# Patient Record
Sex: MALE | Race: WHITE | NOT HISPANIC OR LATINO | Employment: OTHER | URBAN - METROPOLITAN AREA
[De-identification: names, ages, dates, MRNs, and addresses within clinical notes are randomized per-mention and may not be internally consistent; named-entity substitution may affect disease eponyms.]

---

## 2018-07-10 ENCOUNTER — TRANSCRIBE ORDERS (OUTPATIENT)
Dept: ADMINISTRATIVE | Facility: HOSPITAL | Age: 83
End: 2018-07-10

## 2018-07-10 ENCOUNTER — HOSPITAL ENCOUNTER (OUTPATIENT)
Dept: RADIOLOGY | Facility: HOSPITAL | Age: 83
Discharge: HOME | End: 2018-07-10
Payer: COMMERCIAL

## 2018-07-10 DIAGNOSIS — M12.9 ACUTE ARTHROPATHY: Primary | ICD-10-CM

## 2018-07-10 DIAGNOSIS — M12.9 ACUTE ARTHROPATHY: ICD-10-CM

## 2018-07-10 PROCEDURE — 73630 X-RAY EXAM OF FOOT: CPT

## 2019-08-09 ENCOUNTER — HOSPITAL ENCOUNTER (OUTPATIENT)
Dept: RADIOLOGY | Facility: HOSPITAL | Age: 84
Discharge: HOME | End: 2019-08-09
Payer: COMMERCIAL

## 2019-08-09 ENCOUNTER — TRANSCRIBE ORDERS (OUTPATIENT)
Dept: ADMINISTRATIVE | Facility: HOSPITAL | Age: 84
End: 2019-08-09

## 2019-08-09 DIAGNOSIS — I73.9 PERIPHERAL VASCULAR DISEASE, UNSPECIFIED (HCC): Primary | ICD-10-CM

## 2019-08-09 DIAGNOSIS — M12.9 ACUTE ARTHROPATHY: ICD-10-CM

## 2019-08-09 DIAGNOSIS — G62.9 PERIPHERAL NERVE DISORDER: Primary | ICD-10-CM

## 2019-08-09 DIAGNOSIS — M12.9 ACUTE ARTHROPATHY: Primary | ICD-10-CM

## 2019-08-09 PROCEDURE — 72110 X-RAY EXAM L-2 SPINE 4/>VWS: CPT

## 2019-08-13 ENCOUNTER — HOSPITAL ENCOUNTER (OUTPATIENT)
Dept: NON INVASIVE DIAGNOSTICS | Facility: HOSPITAL | Age: 84
Discharge: HOME | End: 2019-08-13
Payer: COMMERCIAL

## 2019-08-13 DIAGNOSIS — I73.9 PERIPHERAL VASCULAR DISEASE, UNSPECIFIED (HCC): ICD-10-CM

## 2019-08-13 PROCEDURE — 93925 LOWER EXTREMITY STUDY: CPT

## 2019-08-13 PROCEDURE — 93925 LOWER EXTREMITY STUDY: CPT | Performed by: SURGERY

## 2019-08-13 PROCEDURE — 93922 UPR/L XTREMITY ART 2 LEVELS: CPT | Performed by: SURGERY

## 2019-08-13 PROCEDURE — 93923 UPR/LXTR ART STDY 3+ LVLS: CPT

## 2019-08-16 ENCOUNTER — HOSPITAL ENCOUNTER (OUTPATIENT)
Dept: NEUROLOGY | Facility: CLINIC | Age: 84
Discharge: HOME | End: 2019-08-16
Payer: COMMERCIAL

## 2019-08-16 DIAGNOSIS — G62.9 PERIPHERAL NERVE DISORDER: ICD-10-CM

## 2019-08-16 PROCEDURE — 95886 MUSC TEST DONE W/N TEST COMP: CPT | Performed by: PHYSICAL MEDICINE & REHABILITATION

## 2019-08-16 PROCEDURE — 95912 NRV CNDJ TEST 11-12 STUDIES: CPT | Performed by: PHYSICAL MEDICINE & REHABILITATION

## 2022-10-18 ENCOUNTER — HOSPITAL ENCOUNTER (INPATIENT)
Facility: HOSPITAL | Age: 87
LOS: 3 days | Discharge: NURSING, REHAB OR LONG-TERM CARE | End: 2022-10-21
Attending: UROLOGY | Admitting: INTERNAL MEDICINE
Payer: COMMERCIAL

## 2022-10-18 ENCOUNTER — ANESTHESIA (INPATIENT)
Dept: PERIOP | Facility: HOSPITAL | Age: 87
End: 2022-10-18
Payer: COMMERCIAL

## 2022-10-18 ENCOUNTER — ANESTHESIA EVENT (INPATIENT)
Dept: PERIOP | Facility: HOSPITAL | Age: 87
End: 2022-10-18
Payer: COMMERCIAL

## 2022-10-18 ENCOUNTER — TELEPHONE (OUTPATIENT)
Dept: UROLOGY | Facility: CLINIC | Age: 87
End: 2022-10-18

## 2022-10-18 ENCOUNTER — HOSPITAL ENCOUNTER (EMERGENCY)
Facility: HOSPITAL | Age: 87
End: 2022-10-18
Attending: EMERGENCY MEDICINE
Payer: COMMERCIAL

## 2022-10-18 ENCOUNTER — APPOINTMENT (EMERGENCY)
Dept: CT IMAGING | Facility: HOSPITAL | Age: 87
End: 2022-10-18
Payer: COMMERCIAL

## 2022-10-18 ENCOUNTER — APPOINTMENT (EMERGENCY)
Dept: RADIOLOGY | Facility: HOSPITAL | Age: 87
End: 2022-10-18
Payer: COMMERCIAL

## 2022-10-18 ENCOUNTER — APPOINTMENT (OUTPATIENT)
Dept: RADIOLOGY | Facility: HOSPITAL | Age: 87
End: 2022-10-18
Payer: COMMERCIAL

## 2022-10-18 ENCOUNTER — PREP FOR PROCEDURE (OUTPATIENT)
Dept: UROLOGY | Facility: CLINIC | Age: 87
End: 2022-10-18

## 2022-10-18 VITALS
OXYGEN SATURATION: 94 % | WEIGHT: 165 LBS | TEMPERATURE: 97.8 F | RESPIRATION RATE: 18 BRPM | HEIGHT: 70 IN | HEART RATE: 89 BPM | BODY MASS INDEX: 23.62 KG/M2 | DIASTOLIC BLOOD PRESSURE: 63 MMHG | SYSTOLIC BLOOD PRESSURE: 135 MMHG

## 2022-10-18 DIAGNOSIS — S32.000D COMPRESSION FRACTURE OF LUMBAR VERTEBRA WITH ROUTINE HEALING, UNSPECIFIED LUMBAR VERTEBRAL LEVEL, SUBSEQUENT ENCOUNTER: Primary | ICD-10-CM

## 2022-10-18 DIAGNOSIS — S32.030A CLOSED COMPRESSION FRACTURE OF L3 LUMBAR VERTEBRA, INITIAL ENCOUNTER (HCC): ICD-10-CM

## 2022-10-18 DIAGNOSIS — S32.040A CLOSED COMPRESSION FRACTURE OF L4 LUMBAR VERTEBRA, INITIAL ENCOUNTER (HCC): ICD-10-CM

## 2022-10-18 DIAGNOSIS — N20.1 LEFT URETERAL STONE: Primary | ICD-10-CM

## 2022-10-18 DIAGNOSIS — N20.1 URETERAL STONE: ICD-10-CM

## 2022-10-18 DIAGNOSIS — N20.1 URETEROLITHIASIS: ICD-10-CM

## 2022-10-18 DIAGNOSIS — Z46.6 ENCOUNTER FOR ADJUSTMENT OF URETERAL STENT: ICD-10-CM

## 2022-10-18 DIAGNOSIS — R33.9 URINARY RETENTION: ICD-10-CM

## 2022-10-18 DIAGNOSIS — R29.6 FALLS FREQUENTLY: Primary | ICD-10-CM

## 2022-10-18 DIAGNOSIS — N28.9 ACUTE RENAL INSUFFICIENCY: ICD-10-CM

## 2022-10-18 PROBLEM — N39.0 COMPLICATED UTI (URINARY TRACT INFECTION): Status: ACTIVE | Noted: 2022-10-18

## 2022-10-18 PROBLEM — E78.5 HYPERLIPIDEMIA: Status: ACTIVE | Noted: 2022-10-08

## 2022-10-18 PROBLEM — N17.9 ACUTE KIDNEY INJURY (HCC): Status: ACTIVE | Noted: 2022-10-18

## 2022-10-18 PROBLEM — K21.9 GASTRO-ESOPHAGEAL REFLUX DISEASE WITHOUT ESOPHAGITIS: Status: ACTIVE | Noted: 2022-10-08

## 2022-10-18 PROBLEM — I10 ESSENTIAL (PRIMARY) HYPERTENSION: Status: ACTIVE | Noted: 2022-10-08

## 2022-10-18 PROBLEM — R41.0 DISORIENTATION: Status: ACTIVE | Noted: 2022-10-18

## 2022-10-18 LAB
2HR DELTA HS TROPONIN: 0 NG/L
ABO GROUP BLD: NORMAL
ABO GROUP BLD: NORMAL
ALBUMIN SERPL BCP-MCNC: 2.6 G/DL (ref 3.5–5)
ALP SERPL-CCNC: 84 U/L (ref 46–116)
ALT SERPL W P-5'-P-CCNC: 44 U/L (ref 12–78)
ANION GAP SERPL CALCULATED.3IONS-SCNC: 8 MMOL/L (ref 4–13)
APTT PPP: 41 SECONDS (ref 23–37)
AST SERPL W P-5'-P-CCNC: 44 U/L (ref 5–45)
BACTERIA UR QL AUTO: ABNORMAL /HPF
BASOPHILS # BLD AUTO: 0.04 THOUSANDS/ÂΜL (ref 0–0.1)
BASOPHILS NFR BLD AUTO: 0 % (ref 0–1)
BILIRUB DIRECT SERPL-MCNC: 0.49 MG/DL (ref 0–0.2)
BILIRUB SERPL-MCNC: 1.5 MG/DL (ref 0.2–1)
BILIRUB UR QL STRIP: NEGATIVE
BLD GP AB SCN SERPL QL: NEGATIVE
BUN SERPL-MCNC: 50 MG/DL (ref 5–25)
CALCIUM SERPL-MCNC: 9.5 MG/DL (ref 8.3–10.1)
CARDIAC TROPONIN I PNL SERPL HS: 12 NG/L
CARDIAC TROPONIN I PNL SERPL HS: 12 NG/L
CHLORIDE SERPL-SCNC: 100 MMOL/L (ref 96–108)
CLARITY UR: CLEAR
CO2 SERPL-SCNC: 24 MMOL/L (ref 21–32)
COLOR UR: YELLOW
CREAT SERPL-MCNC: 2.21 MG/DL (ref 0.6–1.3)
EOSINOPHIL # BLD AUTO: 0.02 THOUSAND/ÂΜL (ref 0–0.61)
EOSINOPHIL NFR BLD AUTO: 0 % (ref 0–6)
ERYTHROCYTE [DISTWIDTH] IN BLOOD BY AUTOMATED COUNT: 14.2 % (ref 11.6–15.1)
GFR SERPL CREATININE-BSD FRML MDRD: 25 ML/MIN/1.73SQ M
GLUCOSE SERPL-MCNC: 125 MG/DL (ref 65–140)
GLUCOSE UR STRIP-MCNC: NEGATIVE MG/DL
HCT VFR BLD AUTO: 32.3 % (ref 36.5–49.3)
HGB BLD-MCNC: 10.6 G/DL (ref 12–17)
HGB UR QL STRIP.AUTO: ABNORMAL
IMM GRANULOCYTES # BLD AUTO: 0.08 THOUSAND/UL (ref 0–0.2)
IMM GRANULOCYTES NFR BLD AUTO: 1 % (ref 0–2)
INR PPP: 1.14 (ref 0.84–1.19)
KETONES UR STRIP-MCNC: NEGATIVE MG/DL
LEUKOCYTE ESTERASE UR QL STRIP: ABNORMAL
LYMPHOCYTES # BLD AUTO: 0.93 THOUSANDS/ÂΜL (ref 0.6–4.47)
LYMPHOCYTES NFR BLD AUTO: 9 % (ref 14–44)
MCH RBC QN AUTO: 34.6 PG (ref 26.8–34.3)
MCHC RBC AUTO-ENTMCNC: 32.8 G/DL (ref 31.4–37.4)
MCV RBC AUTO: 106 FL (ref 82–98)
MONOCYTES # BLD AUTO: 0.76 THOUSAND/ÂΜL (ref 0.17–1.22)
MONOCYTES NFR BLD AUTO: 8 % (ref 4–12)
MUCOUS THREADS UR QL AUTO: ABNORMAL
NEUTROPHILS # BLD AUTO: 8.14 THOUSANDS/ÂΜL (ref 1.85–7.62)
NEUTS SEG NFR BLD AUTO: 82 % (ref 43–75)
NITRITE UR QL STRIP: NEGATIVE
NON-SQ EPI CELLS URNS QL MICRO: ABNORMAL /HPF
NRBC BLD AUTO-RTO: 0 /100 WBCS
PH UR STRIP.AUTO: 5.5 [PH]
PLATELET # BLD AUTO: 330 THOUSANDS/UL (ref 149–390)
PMV BLD AUTO: 8.8 FL (ref 8.9–12.7)
POTASSIUM SERPL-SCNC: 4.6 MMOL/L (ref 3.5–5.3)
PROT SERPL-MCNC: 6.5 G/DL (ref 6.4–8.4)
PROT UR STRIP-MCNC: ABNORMAL MG/DL
PROTHROMBIN TIME: 15.4 SECONDS (ref 11.6–14.5)
RBC # BLD AUTO: 3.06 MILLION/UL (ref 3.88–5.62)
RBC #/AREA URNS AUTO: ABNORMAL /HPF
RH BLD: POSITIVE
RH BLD: POSITIVE
SODIUM SERPL-SCNC: 132 MMOL/L (ref 135–147)
SP GR UR STRIP.AUTO: 1.02 (ref 1–1.03)
SPECIMEN EXPIRATION DATE: NORMAL
UROBILINOGEN UR STRIP-ACNC: <2 MG/DL
WBC # BLD AUTO: 9.97 THOUSAND/UL (ref 4.31–10.16)
WBC #/AREA URNS AUTO: ABNORMAL /HPF

## 2022-10-18 PROCEDURE — 99285 EMERGENCY DEPT VISIT HI MDM: CPT | Performed by: EMERGENCY MEDICINE

## 2022-10-18 PROCEDURE — 80076 HEPATIC FUNCTION PANEL: CPT | Performed by: EMERGENCY MEDICINE

## 2022-10-18 PROCEDURE — 87086 URINE CULTURE/COLONY COUNT: CPT | Performed by: EMERGENCY MEDICINE

## 2022-10-18 PROCEDURE — 93005 ELECTROCARDIOGRAM TRACING: CPT

## 2022-10-18 PROCEDURE — 99285 EMERGENCY DEPT VISIT HI MDM: CPT

## 2022-10-18 PROCEDURE — 86901 BLOOD TYPING SEROLOGIC RH(D): CPT | Performed by: EMERGENCY MEDICINE

## 2022-10-18 PROCEDURE — 99221 1ST HOSP IP/OBS SF/LOW 40: CPT | Performed by: UROLOGY

## 2022-10-18 PROCEDURE — 86850 RBC ANTIBODY SCREEN: CPT | Performed by: EMERGENCY MEDICINE

## 2022-10-18 PROCEDURE — 85610 PROTHROMBIN TIME: CPT | Performed by: EMERGENCY MEDICINE

## 2022-10-18 PROCEDURE — 74177 CT ABD & PELVIS W/CONTRAST: CPT

## 2022-10-18 PROCEDURE — 73552 X-RAY EXAM OF FEMUR 2/>: CPT

## 2022-10-18 PROCEDURE — C2617 STENT, NON-COR, TEM W/O DEL: HCPCS | Performed by: UROLOGY

## 2022-10-18 PROCEDURE — 85025 COMPLETE CBC W/AUTO DIFF WBC: CPT | Performed by: EMERGENCY MEDICINE

## 2022-10-18 PROCEDURE — 71260 CT THORAX DX C+: CPT

## 2022-10-18 PROCEDURE — 96361 HYDRATE IV INFUSION ADD-ON: CPT

## 2022-10-18 PROCEDURE — C1769 GUIDE WIRE: HCPCS | Performed by: UROLOGY

## 2022-10-18 PROCEDURE — 96372 THER/PROPH/DIAG INJ SC/IM: CPT

## 2022-10-18 PROCEDURE — 80048 BASIC METABOLIC PNL TOTAL CA: CPT | Performed by: EMERGENCY MEDICINE

## 2022-10-18 PROCEDURE — 70450 CT HEAD/BRAIN W/O DYE: CPT

## 2022-10-18 PROCEDURE — 36415 COLL VENOUS BLD VENIPUNCTURE: CPT | Performed by: EMERGENCY MEDICINE

## 2022-10-18 PROCEDURE — G1004 CDSM NDSC: HCPCS

## 2022-10-18 PROCEDURE — 74420 UROGRAPHY RTRGR +-KUB: CPT

## 2022-10-18 PROCEDURE — 0T778DZ DILATION OF LEFT URETER WITH INTRALUMINAL DEVICE, VIA NATURAL OR ARTIFICIAL OPENING ENDOSCOPIC: ICD-10-PCS | Performed by: UROLOGY

## 2022-10-18 PROCEDURE — 86900 BLOOD TYPING SEROLOGIC ABO: CPT | Performed by: EMERGENCY MEDICINE

## 2022-10-18 PROCEDURE — 81001 URINALYSIS AUTO W/SCOPE: CPT | Performed by: EMERGENCY MEDICINE

## 2022-10-18 PROCEDURE — 99223 1ST HOSP IP/OBS HIGH 75: CPT | Performed by: INTERNAL MEDICINE

## 2022-10-18 PROCEDURE — 96365 THER/PROPH/DIAG IV INF INIT: CPT

## 2022-10-18 PROCEDURE — BT1FYZZ FLUOROSCOPY OF LEFT KIDNEY, URETER AND BLADDER USING OTHER CONTRAST: ICD-10-PCS | Performed by: UROLOGY

## 2022-10-18 PROCEDURE — 85730 THROMBOPLASTIN TIME PARTIAL: CPT | Performed by: EMERGENCY MEDICINE

## 2022-10-18 PROCEDURE — 72170 X-RAY EXAM OF PELVIS: CPT

## 2022-10-18 PROCEDURE — 52332 CYSTOSCOPY AND TREATMENT: CPT | Performed by: UROLOGY

## 2022-10-18 PROCEDURE — 84484 ASSAY OF TROPONIN QUANT: CPT | Performed by: EMERGENCY MEDICINE

## 2022-10-18 DEVICE — STENT URETERAL 6 FR 26CM INLAY OPTIMA: Type: IMPLANTABLE DEVICE | Site: URETER | Status: FUNCTIONAL

## 2022-10-18 RX ORDER — FENTANYL CITRATE/PF 50 MCG/ML
25 SYRINGE (ML) INJECTION
Status: DISCONTINUED | OUTPATIENT
Start: 2022-10-18 | End: 2022-10-18 | Stop reason: HOSPADM

## 2022-10-18 RX ORDER — OXYCODONE HYDROCHLORIDE 5 MG/1
2.5 TABLET ORAL EVERY 4 HOURS PRN
Status: DISCONTINUED | OUTPATIENT
Start: 2022-10-18 | End: 2022-10-19

## 2022-10-18 RX ORDER — ASPIRIN 81 MG/1
81 TABLET ORAL DAILY
Status: DISCONTINUED | OUTPATIENT
Start: 2022-10-19 | End: 2022-10-21 | Stop reason: HOSPADM

## 2022-10-18 RX ORDER — OLANZAPINE 10 MG/1
2.5 INJECTION, POWDER, LYOPHILIZED, FOR SOLUTION INTRAMUSCULAR
Status: DISCONTINUED | OUTPATIENT
Start: 2022-10-18 | End: 2022-10-21 | Stop reason: HOSPADM

## 2022-10-18 RX ORDER — CEFAZOLIN SODIUM 1 G/50ML
1000 SOLUTION INTRAVENOUS ONCE
Status: DISCONTINUED | OUTPATIENT
Start: 2022-10-18 | End: 2022-10-18 | Stop reason: HOSPADM

## 2022-10-18 RX ORDER — PROPOFOL 10 MG/ML
INJECTION, EMULSION INTRAVENOUS AS NEEDED
Status: DISCONTINUED | OUTPATIENT
Start: 2022-10-18 | End: 2022-10-18

## 2022-10-18 RX ORDER — LEVOTHYROXINE SODIUM 0.12 MG/1
125 TABLET ORAL
Status: DISCONTINUED | OUTPATIENT
Start: 2022-10-19 | End: 2022-10-21 | Stop reason: HOSPADM

## 2022-10-18 RX ORDER — SIMVASTATIN 40 MG
1 TABLET ORAL DAILY
COMMUNITY
Start: 2022-10-06

## 2022-10-18 RX ORDER — HALOPERIDOL 5 MG/ML
5 INJECTION INTRAMUSCULAR ONCE
Status: COMPLETED | OUTPATIENT
Start: 2022-10-18 | End: 2022-10-18

## 2022-10-18 RX ORDER — OMEPRAZOLE 20 MG/1
CAPSULE, DELAYED RELEASE ORAL
COMMUNITY
Start: 2022-04-26

## 2022-10-18 RX ORDER — ACETAMINOPHEN 325 MG/1
975 TABLET ORAL ONCE
Status: CANCELLED | OUTPATIENT
Start: 2022-10-18 | End: 2022-10-18

## 2022-10-18 RX ORDER — MAGNESIUM HYDROXIDE 1200 MG/15ML
LIQUID ORAL AS NEEDED
Status: DISCONTINUED | OUTPATIENT
Start: 2022-10-18 | End: 2022-10-18 | Stop reason: HOSPADM

## 2022-10-18 RX ORDER — CEFAZOLIN SODIUM 1 G/3ML
INJECTION, POWDER, FOR SOLUTION INTRAMUSCULAR; INTRAVENOUS AS NEEDED
Status: DISCONTINUED | OUTPATIENT
Start: 2022-10-18 | End: 2022-10-18

## 2022-10-18 RX ORDER — HYDROMORPHONE HCL IN WATER/PF 6 MG/30 ML
0.2 PATIENT CONTROLLED ANALGESIA SYRINGE INTRAVENOUS
Status: DISCONTINUED | OUTPATIENT
Start: 2022-10-18 | End: 2022-10-19

## 2022-10-18 RX ORDER — ASPIRIN 81 MG/1
81 TABLET ORAL
COMMUNITY

## 2022-10-18 RX ORDER — AMOXICILLIN 250 MG
1 CAPSULE ORAL EVERY EVENING
Status: DISCONTINUED | OUTPATIENT
Start: 2022-10-19 | End: 2022-10-21 | Stop reason: HOSPADM

## 2022-10-18 RX ORDER — PRAVASTATIN SODIUM 40 MG
80 TABLET ORAL
Status: DISCONTINUED | OUTPATIENT
Start: 2022-10-19 | End: 2022-10-21 | Stop reason: HOSPADM

## 2022-10-18 RX ORDER — ONDANSETRON 2 MG/ML
4 INJECTION INTRAMUSCULAR; INTRAVENOUS EVERY 6 HOURS PRN
Status: DISCONTINUED | OUTPATIENT
Start: 2022-10-18 | End: 2022-10-21 | Stop reason: HOSPADM

## 2022-10-18 RX ORDER — ONDANSETRON 2 MG/ML
INJECTION INTRAMUSCULAR; INTRAVENOUS AS NEEDED
Status: DISCONTINUED | OUTPATIENT
Start: 2022-10-18 | End: 2022-10-18

## 2022-10-18 RX ORDER — ACETAMINOPHEN 325 MG/1
650 TABLET ORAL EVERY 6 HOURS PRN
Status: DISCONTINUED | OUTPATIENT
Start: 2022-10-18 | End: 2022-10-21 | Stop reason: HOSPADM

## 2022-10-18 RX ORDER — SODIUM CHLORIDE, SODIUM LACTATE, POTASSIUM CHLORIDE, CALCIUM CHLORIDE 600; 310; 30; 20 MG/100ML; MG/100ML; MG/100ML; MG/100ML
INJECTION, SOLUTION INTRAVENOUS CONTINUOUS PRN
Status: DISCONTINUED | OUTPATIENT
Start: 2022-10-18 | End: 2022-10-18

## 2022-10-18 RX ORDER — FENTANYL CITRATE 50 UG/ML
INJECTION, SOLUTION INTRAMUSCULAR; INTRAVENOUS AS NEEDED
Status: DISCONTINUED | OUTPATIENT
Start: 2022-10-18 | End: 2022-10-18

## 2022-10-18 RX ORDER — HEPARIN SODIUM 5000 [USP'U]/ML
5000 INJECTION, SOLUTION INTRAVENOUS; SUBCUTANEOUS EVERY 8 HOURS SCHEDULED
Status: DISCONTINUED | OUTPATIENT
Start: 2022-10-18 | End: 2022-10-21 | Stop reason: HOSPADM

## 2022-10-18 RX ORDER — SODIUM CHLORIDE 9 MG/ML
125 INJECTION, SOLUTION INTRAVENOUS CONTINUOUS
Status: DISCONTINUED | OUTPATIENT
Start: 2022-10-18 | End: 2022-10-18

## 2022-10-18 RX ORDER — ACETAMINOPHEN 325 MG/1
975 TABLET ORAL ONCE
Status: DISCONTINUED | OUTPATIENT
Start: 2022-10-18 | End: 2022-10-18 | Stop reason: HOSPADM

## 2022-10-18 RX ORDER — LOSARTAN POTASSIUM 50 MG/1
1 TABLET ORAL DAILY
Status: ON HOLD | COMMUNITY
Start: 2022-05-16 | End: 2022-11-04

## 2022-10-18 RX ORDER — LEVOTHYROXINE SODIUM 0.12 MG/1
125 TABLET ORAL DAILY
COMMUNITY
Start: 2022-10-03

## 2022-10-18 RX ORDER — AMOXICILLIN 250 MG
1 CAPSULE ORAL EVERY EVENING
Status: ON HOLD | COMMUNITY
Start: 2022-10-14 | End: 2022-11-04 | Stop reason: SDUPTHER

## 2022-10-18 RX ORDER — SUCCINYLCHOLINE/SOD CL,ISO/PF 100 MG/5ML
SYRINGE (ML) INTRAVENOUS AS NEEDED
Status: DISCONTINUED | OUTPATIENT
Start: 2022-10-18 | End: 2022-10-18

## 2022-10-18 RX ORDER — SODIUM CHLORIDE 9 MG/ML
125 INJECTION, SOLUTION INTRAVENOUS CONTINUOUS
Status: DISCONTINUED | OUTPATIENT
Start: 2022-10-18 | End: 2022-10-18 | Stop reason: HOSPADM

## 2022-10-18 RX ORDER — PANTOPRAZOLE SODIUM 20 MG/1
20 TABLET, DELAYED RELEASE ORAL
Status: DISCONTINUED | OUTPATIENT
Start: 2022-10-19 | End: 2022-10-21 | Stop reason: HOSPADM

## 2022-10-18 RX ORDER — CEFTRIAXONE 1 G/50ML
1000 INJECTION, SOLUTION INTRAVENOUS ONCE
Status: COMPLETED | OUTPATIENT
Start: 2022-10-18 | End: 2022-10-18

## 2022-10-18 RX ORDER — SODIUM CHLORIDE, SODIUM LACTATE, POTASSIUM CHLORIDE, CALCIUM CHLORIDE 600; 310; 30; 20 MG/100ML; MG/100ML; MG/100ML; MG/100ML
100 INJECTION, SOLUTION INTRAVENOUS CONTINUOUS
Status: DISCONTINUED | OUTPATIENT
Start: 2022-10-18 | End: 2022-10-19

## 2022-10-18 RX ORDER — CEFAZOLIN SODIUM 1 G/50ML
1000 SOLUTION INTRAVENOUS ONCE
Status: CANCELLED | OUTPATIENT
Start: 2022-10-18 | End: 2022-10-18

## 2022-10-18 RX ADMIN — Medication 100 MG: at 16:56

## 2022-10-18 RX ADMIN — PHENYLEPHRINE HYDROCHLORIDE 20 MCG/MIN: 10 INJECTION INTRAVENOUS at 16:56

## 2022-10-18 RX ADMIN — SODIUM CHLORIDE 125 ML/HR: 0.9 INJECTION, SOLUTION INTRAVENOUS at 15:10

## 2022-10-18 RX ADMIN — HEPARIN SODIUM 5000 UNITS: 5000 INJECTION INTRAVENOUS; SUBCUTANEOUS at 23:30

## 2022-10-18 RX ADMIN — SODIUM CHLORIDE, SODIUM LACTATE, POTASSIUM CHLORIDE, AND CALCIUM CHLORIDE: .6; .31; .03; .02 INJECTION, SOLUTION INTRAVENOUS at 16:52

## 2022-10-18 RX ADMIN — CEFTRIAXONE 1000 MG: 1 INJECTION, SOLUTION INTRAVENOUS at 12:23

## 2022-10-18 RX ADMIN — FENTANYL CITRATE 25 MCG: 50 INJECTION INTRAMUSCULAR; INTRAVENOUS at 17:01

## 2022-10-18 RX ADMIN — HALOPERIDOL LACTATE 5 MG: 5 INJECTION, SOLUTION INTRAMUSCULAR at 12:40

## 2022-10-18 RX ADMIN — PROPOFOL 80 MG: 10 INJECTION, EMULSION INTRAVENOUS at 16:56

## 2022-10-18 RX ADMIN — CEFAZOLIN 1000 MG: 1 INJECTION, POWDER, FOR SOLUTION INTRAMUSCULAR; INTRAVENOUS at 17:07

## 2022-10-18 RX ADMIN — FENTANYL CITRATE 25 MCG: 50 INJECTION INTRAMUSCULAR; INTRAVENOUS at 17:11

## 2022-10-18 RX ADMIN — SODIUM CHLORIDE, SODIUM LACTATE, POTASSIUM CHLORIDE, AND CALCIUM CHLORIDE 100 ML/HR: .6; .31; .03; .02 INJECTION, SOLUTION INTRAVENOUS at 19:57

## 2022-10-18 RX ADMIN — PROPOFOL 20 MG: 10 INJECTION, EMULSION INTRAVENOUS at 17:13

## 2022-10-18 RX ADMIN — IOHEXOL 100 ML: 350 INJECTION, SOLUTION INTRAVENOUS at 10:31

## 2022-10-18 RX ADMIN — ONDANSETRON 4 MG: 2 INJECTION INTRAMUSCULAR; INTRAVENOUS at 17:15

## 2022-10-18 NOTE — CONSULTS
UROLOGY CONSULTATION NOTE     Patient Identifiers: Augustine Uribe (MRN [id number])  Service Requesting Consultation:  [facility] Emergency Medicine, [facility] Internal Medicine  Service Providing Consultation:  Urology, Tree Howe    Date of Service: 10/18/2022  Consults    Reason for Consultation:  Altered mental status, urinary retention, complicated UTI, left ureteral stone    History of Present Illness:     Augustine Uirbe is a 80 y o  old with a history of the medical problems listed below  Per review with his daughter he normally has the mental functioning of a 61year-old  Recently he has been disoriented and this has caused great concern  He did have a fall resulting in a hip fracture which was repaired by way of a hemiarthroplasty roughly 10 days ago  Denies a previous urologic history  Goals of care are for aggressive management  His daughter is Adams Boxer at [phone]    She has participated in the shared/informed decision-making model and provides informed consent to proceed with the decision for surgery for cystoscopy with left ureteral stent placement with a plan for a subsequent, staged ureteroscopic stone intervention at an appropriate interval     The following portions of the patient's history were reviewed and updated as appropriate: allergies, current medications, past family history, past medical history, past social history, past surgical history and problem list       Past Medical, Past Surgical History:     Past Medical History:   Diagnosis Date   • BPH (benign prostatic hyperplasia)    • Hyperlipidemia    • Hypertension    • Hypothyroidism    • Peripheral vascular disease ([facility] [address] )    :    Past Surgical History:   Procedure Laterality Date   • JOINT REPLACEMENT Right     hip   :    Medications, Allergies:     No current facility-administered medications for this encounter  Allergies:  No Known Allergies:    Social and Family History:   Social History:      Social History     Tobacco Use   Smoking Status Not on file   Smokeless Tobacco Not on file       Family History:  No family history on file :     Review of Systems:     The patient is unable to participate in the review of systems due to his altered mental status    Physical Exam:   There were no vitals taken for this visit  Temp (24hrs), Av 8 °F (36 6 °C), Min:97 8 °F (36 6 °C), Max:97 8 °F (36 6 °C)  current; No intake/output data recorded  General:  Appears pale and ill, disoriented, in restraints    Cardiac: Peripheral edema:  Negative, peripheral pulses are present and indicated a     Pulmonary: Non-labored breathing    Abdomen: Soft, non-tender, non-distended  Genitourinary:  Negative CVA tenderness, negative suprapubic tenderness  Neurological:  The patient is disoriented, unable to perform a neurologic exam    Musculoskeletal: Extremities are warm, ROM is not assessed    Psychiatric:  Unable to assess apart from that listed above    Lymphatic: There is not adenopathy in the abdominal region  Skin:  Pale, changes of aging present    JOY:  Clear yellow urine    Labs:     Lab Results   Component Value Date    HGB 10 6 (L) 10/18/2022    HCT 32 3 (L) 10/18/2022    WBC 9 97 10/18/2022     10/18/2022   ]    Lab Results   Component Value Date    K 4 6 10/18/2022     10/18/2022    CO2 24 10/18/2022    BUN 50 (H) 10/18/2022    CREATININE 2 21 (H) 10/18/2022    CALCIUM 9 5 10/18/2022   ]    Imaging:   I personally reviewed the images and report of the following studies, and reviewed them with the patient:    CT scan was reviewed, showing a distended bladder and a left ureteral stone  ASSESSMENT:     80 y o  old male with  acute kidney injury, disorientation, altered mental status, concern for complicated UTI  Distal left ureteral stone      I spoke with the patient's daughter regarding the decision for surgery for cystoscopy with left ureteral stent placement to be followed by subsequent ureteroscopic stone intervention  Witnessed informed consent by telephone was obtained  He was marked on his left arm  His daughter understands the risks and benefits and alternatives of surgical intervention  This is in keeping with goals of care for aggressive management and in keeping with his baseline performance status which is quite good  Gabino Zimmer PLAN:     Proceed emergently to cystoscopy with left retrograde pyelography and left ureteral stent placement  Following surgery we will arrange for subsequent ureteroscopic stone intervention  Portions of the above record have been created with voice recognition software  Occasional wrong word or "sound alike" substitution may have occurred due to the inherent limitations of voice recognition software  Read the chart carefully and recognize, using context, where substitution may have occurred  Thank you for allowing me to participate in this patients’ care  Please do not hesitate to call with any additional questions    Rafia Mendieta

## 2022-10-18 NOTE — QUICK NOTE
I was called to review this patient's films  He appears to have a distal left stone along with impressive urinary retention  He is seen to have acute kidney injury  He does have baseline frailty having undergone a hemiarthroplasty for hip trauma due to a fall in the near past     He is currently not septic, however he does meet criteria for renal unit decompression  In the short term I would recommend placement of a urethral White catheter with the administration of ceftriaxone  He does meet criteria for transfer to the [facility] for ureteral stenting later today pending operating room availability  He will require admission to the [facility] Internal Medicine Service  The tentative plan based on the available virtual information is for cystoscopy with left ureteral stent placement    Further discussion of ureteral stent exchanges versus definitive ureteroscopy with laser lithotripsy can be had in the office as an outpatient

## 2022-10-18 NOTE — ED NOTES
[facility] arrived to transport patient to OR holding at [facility]  Will work on calling report       Cristhian Conway RN  10/18/22 8931

## 2022-10-18 NOTE — ED PROVIDER NOTES
Emergency Department Trauma Note  Lane Romeo 80 y o  male MRN: [id number]  Unit/Bed#: JOO POOL/JOO Encounter: [id number]      Trauma Alert: Trauma Acuity: Trauma Evaluation  Model of Arrival: Mode of Arrival: BLS via    Trauma Team: Current Providers  Attending Provider: Marian Stone DO  Registered Nurse: Ansley Rizo RN  Consulting Physician: Rosalba Courtney MD  Consultants:     Other: {urology B Wright - STAT consult; notified at 12 noon via text; History of Present Illness     Chief Complaint:   Chief Complaint   Patient presents with   • Fall     Patient arrives via EMS from [facility], reports a fall last night and was found on floor this morning  Patient had a recent right hip surgery  • Urinary Retention     [facility] requesting patient be bladder scanned because he has not urinated since 0730     HPI:  Lane Romeo is a 80 y o  male who presents with falls  Mechanism:Details of Incident: found on floor at [facility] Injury Date: 10/18/22   Injury Occurence Location - [address] [place] [address] Way: [facility]    Past medical history dementia brought in by ambulance from [facility] a fall last night that was witnessed and then found down this morning  Also staff noticed urinary retention and when they did a straight cath 1500 mils came out  GCS 14  Tenderness at the pelvis no tenderness at the ribs  Review of Systems   Unable to perform ROS: Dementia   Constitutional: Negative for chills and fever  HENT: Negative for rhinorrhea and sore throat  Respiratory: Negative for shortness of breath  Cardiovascular: Negative for chest pain  Gastrointestinal: Negative for constipation, diarrhea, nausea and vomiting  Genitourinary: Negative for dysuria and frequency  Skin: Negative for rash  All other systems reviewed and are negative        Historical Information     Immunizations:   Immunization History   Administered Date(s) Administered   • COVID-19 PFIZER VACCINE 0 3 ML IM 02/09/2021, 03/02/2021       Past Medical History:   Diagnosis Date   • BPH (benign prostatic hyperplasia)    • Hyperlipidemia    • Hypertension    • Hypothyroidism    • Peripheral vascular disease ([address] )      No family history on file  Past Surgical History:   Procedure Laterality Date   • JOINT REPLACEMENT Right     hip        E-Cigarette/Vaping     E-Cigarette/Vaping Substances       Family History: non-contributory    Meds/Allergies   Prior to Admission Medications   Prescriptions Last Dose Informant Patient Reported? Taking?   aspirin (ECOTRIN LOW STRENGTH) 81 mg EC tablet   Yes No   Sig: Take 81 mg by mouth   levothyroxine 125 mcg tablet   Yes Yes   Sig: Take 125 mcg by mouth daily   losartan (COZAAR) 50 mg tablet   Yes Yes   Sig: Take 1 tablet by mouth daily   omeprazole (PriLOSEC) 20 mg delayed release capsule   Yes Yes   Sig: take 1 capsule by oral route  every day 30 minutes before breakfast   senna-docusate sodium (SENOKOT S) 8 6-50 mg per tablet   Yes Yes   Sig: Take 1 tablet by mouth every evening   simvastatin (ZOCOR) 40 mg tablet   Yes Yes   Sig: Take 1 tablet by mouth daily      Facility-Administered Medications: None       No Known Allergies    PHYSICAL EXAM    PE limited by: dementia    Objective   Vitals:   First set: Temperature: 97 8 °F (36 6 °C) (10/18/22 0947)  Pulse: 83 (10/18/22 0947)  Respirations: 20 (10/18/22 0947)  Blood Pressure: 119/65 (10/18/22 0947)  SpO2: 98 % (10/18/22 0947)    Primary Survey:   (A) Airway: patent  (B) Breathing: clear  (C) Circulation: Pulses:   normal  (D) Disabliity:  GCS Total:  14  (E) Expose:  Completed    Secondary Survey: (Click on Physical Exam tab above)  Physical Exam  Vitals and nursing note reviewed  Constitutional:       Appearance: He is well-developed  HENT:      Head: Normocephalic and atraumatic        Right Ear: External ear normal       Left Ear: External ear normal       Nose: Nose normal    Eyes: Conjunctiva/sclera: Conjunctivae normal       Pupils: Pupils are equal, round, and reactive to light  Cardiovascular:      Rate and Rhythm: Normal rate and regular rhythm  Heart sounds: Normal heart sounds  Pulmonary:      Effort: Pulmonary effort is normal  No respiratory distress  Breath sounds: Normal breath sounds  No wheezing  Abdominal:      General: Bowel sounds are normal  There is no distension  Palpations: Abdomen is soft  Tenderness: There is no abdominal tenderness  Genitourinary:     Penis: Normal        Testes: Normal    Musculoskeletal:         General: No deformity  Cervical back: Normal range of motion and neck supple  No bony tenderness  No spinous process tenderness  Thoracic back: No bony tenderness  Lumbar back: Tenderness present  Back:       Right hip: Tenderness present  Decreased range of motion  Legs:    Skin:     General: Skin is warm and dry  Findings: No rash  Neurological:      General: No focal deficit present  Mental Status: He is alert  He is disoriented  GCS: GCS eye subscore is 4  GCS verbal subscore is 4  GCS motor subscore is 6  Sensory: No sensory deficit  Psychiatric:         Mood and Affect: Mood normal          Cervical spine cleared by clinical criteria?  Yes     Invasive Devices  Report    Peripheral Intravenous Line  Duration           Peripheral IV 10/18/22 Right Antecubital <1 day                Lab Results:   Results Reviewed     Procedure Component Value Units Date/Time    HS Troponin I 2hr [[id number]]  (Normal) Collected: 10/18/22 1509    Lab Status: Final result Specimen: Blood from Hand, Right Updated: 10/18/22 1547     hs TnI 2hr 12 ng/L      Delta 2hr hsTnI 0 ng/L     Urine Microscopic [[id number]]  (Abnormal) Collected: 10/18/22 1107    Lab Status: Final result Specimen: Urine, Indwelling White Catheter Updated: 10/18/22 1140     RBC, UA 2-4 /hpf      WBC, UA 10-20 /hpf      Epithelial Cells None Seen /hpf      Bacteria, UA Occasional /hpf      MUCUS THREADS None Seen    Urine culture [[id number]] Collected: 10/18/22 1107    Lab Status:  In process Specimen: Urine, Indwelling White Catheter Updated: 10/18/22 1140    HS Troponin 0hr (reflex protocol) [[id number]]  (Normal) Collected: 10/18/22 1059    Lab Status: Final result Specimen: Blood from Arm, Right Updated: 10/18/22 1138     hs TnI 0hr 12 ng/L     APTT [[id number]]  (Abnormal) Collected: 10/18/22 1059    Lab Status: Final result Specimen: Blood from Arm, Right Updated: 10/18/22 1128     PTT 41 seconds     Protime-INR [[id number]]  (Abnormal) Collected: 10/18/22 1059    Lab Status: Final result Specimen: Blood from Arm, Right Updated: 10/18/22 1128     Protime 15 4 seconds      INR 1 14    UA w Reflex to Microscopic w Reflex to Culture [[id number]]  (Abnormal) Collected: 10/18/22 1107    Lab Status: Final result Specimen: Urine, Indwelling White Catheter Updated: 10/18/22 1123     Color, UA Yellow     Clarity, UA Clear     Specific Gravity, UA 1 025     pH, UA 5 5     Leukocytes, UA Small     Nitrite, UA Negative     Protein, UA Trace mg/dl      Glucose, UA Negative mg/dl      Ketones, UA Negative mg/dl      Urobilinogen, UA <2 0 mg/dl      Bilirubin, UA Negative     Occult Blood, UA Small    CBC and differential [[id number]]  (Abnormal) Collected: 10/18/22 1059    Lab Status: Final result Specimen: Blood from Arm, Right Updated: 10/18/22 1110     WBC 9 97 Thousand/uL      RBC 3 06 Million/uL      Hemoglobin 10 6 g/dL      Hematocrit 32 3 %       fL      MCH 34 6 pg      MCHC 32 8 g/dL      RDW 14 2 %      MPV 8 8 fL      Platelets 323 Thousands/uL      nRBC 0 /100 WBCs      Neutrophils Relative 82 %      Immat GRANS % 1 %      Lymphocytes Relative 9 %      Monocytes Relative 8 %      Eosinophils Relative 0 %      Basophils Relative 0 %      Neutrophils Absolute 8 14 Thousands/µL      Immature Grans Absolute 0 08 Thousand/uL      Lymphocytes Absolute 0 93 Thousands/µL      Monocytes Absolute 0 76 Thousand/µL      Eosinophils Absolute 0 02 Thousand/µL      Basophils Absolute 0 04 Thousands/µL     Basic metabolic panel [[id number]]  (Abnormal) Collected: 10/18/22 1010    Lab Status: Final result Specimen: Blood from Arm, Right Updated: 10/18/22 1047     Sodium 132 mmol/L      Potassium 4 6 mmol/L      Chloride 100 mmol/L      CO2 24 mmol/L      ANION GAP 8 mmol/L      BUN 50 mg/dL      Creatinine 2 21 mg/dL      Glucose 125 mg/dL      Calcium 9 5 mg/dL      eGFR 25 ml/min/1 73sq m     Narrative:      [place] guidelines for Chronic Kidney Disease (CKD):   •  Stage 1 with normal or high GFR (GFR > 90 mL/min/1 73 square meters)  •  Stage 2 Mild CKD (GFR = 60-89 mL/min/1 73 square meters)  •  Stage 3A Moderate CKD (GFR = 45-59 mL/min/1 73 square meters)  •  Stage 3B Moderate CKD (GFR = 30-44 mL/min/1 73 square meters)  •  Stage 4 Severe CKD (GFR = 15-29 mL/min/1 73 square meters)  •  Stage 5 End Stage CKD (GFR <15 mL/min/1 73 square meters)  Note: GFR calculation is accurate only with a steady state creatinine    Hepatic function panel [[id number]]  (Abnormal) Collected: 10/18/22 1010    Lab Status: Final result Specimen: Blood from Arm, Right Updated: 10/18/22 1047     Total Bilirubin 1 50 mg/dL      Bilirubin, Direct 0 49 mg/dL      Alkaline Phosphatase 84 U/L      AST 44 U/L      ALT 44 U/L      Total Protein 6 5 g/dL      Albumin 2 6 g/dL                  Imaging Studies:   Direct to CT: No  XR femur 2 views RIGHT   Final Result by Nida Leigh MD (10/18 1130)      No acute osseous abnormality              Workstation performed: [id number]         CT chest abdomen pelvis w contrast   Final Result by Amirah Carrion MD (10/18 1135)      No solid visceral injury seen   1 cm obstructing left ureteral calculus in the pelvic portion of the left ureter with mild left hydronephrosis   Nonobstructing 4 mm calculus lower pole left kidney Distended urinary bladder   Superior endplate depression of the L4 vertebra, age indeterminate   Biconcavity of the L3 vertebra with 60% loss of vertebral height, age indeterminate probably chronic related to osteoporotic compression   Mild bronchitic changes right lung base   The study was marked in EPIC for immediate notification  Workstation performed: [id number]         CT recon only lumbar spine (No Charge)   Final Result by Elizabeth Hamilton MD (10/18 1144)      Superior endplate depression of the L4 vertebra with mild central depression, age indeterminate fracture      About 60% loss of the vertebral height of the L3 vertebra with the endplate depression, age indeterminate  Correlate with point tenderness in this area      Mild Central canal narrowing at L4/5 with the facet joint disease and ligamentous hypertrophy   Left foraminal protrusion at L4-5 with moderate left foraminal narrowing      The study was marked in EPIC for immediate notification  Workstation performed: [id number]         TRAUMA - CT head wo contrast   Final Result by Elizabeth Hamilton MD (10/18 1112)      No acute intracranial hemorrhage seen   No mass effect or midline shift seen   No extra-axial collection            Workstation performed: [id number]         XR Trauma pelvis ap only 1 or 2 vw   Final Result by Juan Sethi MD (10/18 1018)      No acute osseous abnormality  Right hip arthroplasty        Workstation performed: [id number]               Procedures  ECG 12 Lead Documentation Only    Date/Time: 10/18/2022 5:06 PM  Performed by: Felice North DO  Authorized by: Felice North DO     Indications / Diagnosis:  Falls  ECG reviewed by me, the ED Provider: yes    Patient location:  ED  Previous ECG:     Previous ECG:  Compared to current    Similarity:  No change  Interpretation:     Interpretation: normal    Rate:     ECG rate:  83    ECG rate assessment: normal    Rhythm:     Rhythm: sinus rhythm    Ectopy:     Ectopy: none    QRS:     QRS axis:  Normal    QRS intervals:  Normal  Conduction:     Conduction: normal    ST segments:     ST segments:  Normal  T waves:     T waves: normal               ED Course           MDM  Number of Diagnoses or Management Options  Acute renal insufficiency: new and requires workup  Closed compression fracture of L3 lumbar vertebra, initial encounter [facility]): new and requires workup  Closed compression fracture of L4 lumbar vertebra, initial encounter [facility]): new and requires workup  Falls frequently: new and requires workup  Ureterolithiasis: new and requires workup  Urinary retention: new and requires workup     Amount and/or Complexity of Data Reviewed  Clinical lab tests: ordered and reviewed  Tests in the radiology section of CPT®: ordered and reviewed  Obtain history from someone other than the patient: yes  Discuss the patient with other providers: yes    Patient Progress  Patient progress: improved          Disposition  [facility] Transfer: No  Final diagnoses:   Falls frequently   Closed compression fracture of L3 lumbar vertebra, initial encounter ([facility] )   Closed compression fracture of L4 lumbar vertebra, initial encounter ([place] [address] )   Acute renal insufficiency   Urinary retention   Ureterolithiasis     Time reflects when diagnosis was documented in both MDM as applicable and the Disposition within this note     Time User Action Codes Description Comment    10/18/2022 12:03 PM Pk Moder Add [R29 6] Falls frequently     10/18/2022 12:03 PM Pk Moder Add [S32 030A] Closed compression fracture of L3 lumbar vertebra, initial encounter ([place] [address] )     10/18/2022 12:03 PM Pk Moder Add [S32 040A] Closed compression fracture of L4 lumbar vertebra, initial encounter ([place] [address] )     10/18/2022 12:25 PM Lolly Josi Add [N20 1] Ureteral stone     10/18/2022  1:42 PM Pk Moder Add [N28 9] Acute renal insufficiency     10/18/2022  1:42 PM Chandler Rhiannon [R33 9] Urinary retention     10/18/2022  1:42 PM aFrhan Longoria Add [N20 1] Ureterolithiasis       ED Disposition     ED Disposition   Transfer to Another Facility-In Network    Condition   --    Date/Time   Tue Oct 18, 2022  1:44 PM    Comment   Samuel Jama should be transferred out to [facility]             MD Documentation    Koko Ghosh Most Recent Value   Patient Condition The patient has been stabilized such that within reasonable medical probability, no material deterioration of the patient condition or the condition of the unborn child(bethany) is likely to result from the transfer   Reason for Transfer Level of Care needed not available at this facility  [urology]   Benefits of Transfer Specialized equipment and/or services available at the receiving facility (Include comment)________________________   Risks of Transfer Potential for delay in receiving treatment, Potential deterioration of medical condition, Loss of IV, Increased discomfort during transfer, Possible worsening of condition or death during transfer   Accepting Physician [address] [place] [address] Name, [address] Lugo, [place]    (Name & Tel number[place] [phone]   Sending MD Koby Fowler DO   Provider Certification General risk, such as traffic hazards, adverse weather conditions, rough terrain or turbulence, possible failure of equipment (including vehicle or aircraft), or consequences of actions of persons outside the control of the transport personnel, Unanticipated needs of medical equipment and personnel during transport, Risk of worsening condition, The possibility of a transport vehicle being unavailable      RN Documentation    Flowsheet Row Most [address] [facility] Name, [address] Lugo, [address], [address] Assignment OR holding [facility]    (Name & Tel number[place] [phone]   Report Given to [facility] RN   Medications Reviewed with Next Provider of Service Yes   Transport Mode Ambulance   Level of Care Basic life support   Copies of Medical Records Sent History and Physical   Transfer Date 10/18/22   Transfer Time 1540      Follow-up Information    None       Discharge Medication List as of 10/18/2022  4:14 PM      CONTINUE these medications which have NOT CHANGED    Details   aspirin (ECOTRIN LOW STRENGTH) 81 mg EC tablet Take 81 mg by mouth, Historical Med      levothyroxine 125 mcg tablet Take 125 mcg by mouth daily, Starting Mon 10/3/2022, Historical Med      losartan (COZAAR) 50 mg tablet Take 1 tablet by mouth daily, Starting Mon 5/16/2022, Historical Med      omeprazole (PriLOSEC) 20 mg delayed release capsule take 1 capsule by oral route  every day 30 minutes before breakfast, Historical Med      senna-docusate sodium (SENOKOT S) 8 6-50 mg per tablet Take 1 tablet by mouth every evening, Starting Fri 10/14/2022, Until Sat 10/14/2023, Historical Med      simvastatin (ZOCOR) 40 mg tablet Take 1 tablet by mouth daily, Starting Thu 10/6/2022, Historical Med           No discharge procedures on file      PDMP Review     None          ED Provider  Electronically Signed by         Laly King DO  10/18/22 1920

## 2022-10-18 NOTE — TELEPHONE ENCOUNTER
The patient is status post placement of a 6 [place] by 26 centimeter left ureteral stent  Please look for a date for ureteroscopy with laser lithotripsy, left, with any available urologist in roughly 4 weeks  He will need a urine culture 3 weeks prior  Please update the stent database

## 2022-10-18 NOTE — ANESTHESIA POSTPROCEDURE EVALUATION
Post-Op Assessment Note    CV Status:  Stable  Pain Score: 0    Pain management: adequate     Mental Status:  Alert and awake   Hydration Status:  Euvolemic   PONV Controlled:  Controlled   Airway Patency:  Patent      Post Op Vitals Reviewed: Yes      Staff: CRNA         No complications documented      BP   120/57   Temp      Pulse 87   Resp 14   SpO2 99

## 2022-10-18 NOTE — ANESTHESIA PREPROCEDURE EVALUATION
Procedure:  CYSTOSCOPY RETROGRADE PYELOGRAM WITH INSERTION STENT URETERAL (Left Bladder)    Relevant Problems   No relevant active problems     Hypothyroid    hypertension     Physical Exam    Airway    Mallampati score: II  TM Distance: >3 FB  Neck ROM: full     Dental   No notable dental hx     Cardiovascular  Cardiovascular exam normal    Pulmonary  Pulmonary exam normal     Other Findings    ekg  nsr    Anesthesia Plan  ASA Score- 2     Anesthesia Type- general with ASA Monitors  Additional Monitors:   Airway Plan:           Plan Factors-Exercise tolerance (METS): >4 METS  Chart reviewed  EKG reviewed  Imaging results reviewed  Existing labs reviewed  Patient summary reviewed  Patient is not a current smoker  Patient not instructed to abstain from smoking on day of procedure  Patient did not smoke on day of surgery  Induction- intravenous  Postoperative Plan-     Informed Consent- Anesthetic plan and risks discussed with patient  I personally reviewed this patient with the CRNA  Discussed and agreed on the Anesthesia Plan with the CRNA  Faraz Palumbo

## 2022-10-18 NOTE — EMTALA/ACUTE CARE TRANSFER
[facility] EMERGENCY DEPARTMENT  [address] [facility] [address]  Dept: [phone]      SAIMAB TRANSFER CONSENT    NAME Marycarmen Fernandes                                         1934                              MRN [id number]    I have been informed of my rights regarding examination, treatment, and transfer   by Dr Doreen Martins DO    Benefits: Specialized equipment and/or services available at the receiving facility (Include comment)________________________    Risks: Potential for delay in receiving treatment, Potential deterioration of medical condition, Loss of IV, Increased discomfort during transfer, Possible worsening of condition or death during transfer      Consent for Transfer:  I acknowledge that my medical condition has been evaluated and explained to me by the emergency department physician or other qualified medical person and/or my attending physician, who has recommended that I be transferred to the service of  Accepting Physician: Vy at [address] Name, [address] : [place], [place]  The above potential benefits of such transfer, the potential risks associated with such transfer, and the probable risks of not being transferred have been explained to me, and I fully understand them  The doctor has explained that, in my case, the benefits of transfer outweigh the risks  I agree to be transferred  I authorize the performance of emergency medical procedures and treatments upon me in both transit and upon arrival at the receiving facility  Additionally, I authorize the release of any and all medical records to the receiving facility and request they be transported with me, if possible  I understand that the safest mode of transportation during a medical emergency is an ambulance and that the Hospital advocates the use of this mode of transport   Risks of traveling to the receiving facility by car, including absence of medical control, life sustaining equipment, such as oxygen, and medical personnel has been explained to me and I fully understand them  (BRITNI CORRECT BOX BELOW)  [  ]  I consent to the stated transfer and to be transported by ambulance/helicopter  [  ]  I consent to the stated transfer, but refuse transportation by ambulance and accept full responsibility for my transportation by car  I understand the risks of non-ambulance transfers and I exonerate the Hospital and its staff from any deterioration in my condition that results from this refusal     X___________________________________________    DATE  10/18/22  TIME________  Signature of patient or legally responsible individual signing on patient behalf           RELATIONSHIP TO PATIENT_________________________          Provider Certification    NAME Hugo Bedolla                                         1934                              MRN [id number]    A medical screening exam was performed on the above named patient  Based on the examination:    Condition Necessitating Transfer The primary encounter diagnosis was Falls frequently  Diagnoses of Closed compression fracture of L3 lumbar vertebra, initial encounter [facility]), Closed compression fracture of L4 lumbar vertebra, [facility] ), Ureteral stone, Acute renal insufficiency, Urinary retention, and Ureterolithiasis were also pertinent to this visit      Patient Condition: The patient has been stabilized such that within reasonable medical probability, no material deterioration of the patient condition or the condition of the unborn child(bethany) is likely to result from the transfer    Reason for Transfer: Level of Care needed not available at this facility (urology)    Transfer Requirements: [address] [place] [address]   · Space available and qualified personnel available for treatment as acknowledged by [id number]  · Agreed to accept transfer and to provide appropriate medical treatment as acknowledged by Vy  · Appropriate medical records of the examination and treatment of the patient are provided at the time of transfer   [address] [facility], [address] _______  · Transfer will be performed by qualified personnel from    and appropriate transfer equipment as required, including the use of necessary and appropriate life support measures  Provider Certification: I have examined the patient and explained the following risks and benefits of being transferred/refusing transfer to the patient/family:  General risk, such as traffic hazards, adverse weather conditions, rough terrain or turbulence, possible failure of equipment (including vehicle or aircraft), or consequences of actions of persons outside the control of the transport personnel, Unanticipated needs of medical equipment and personnel during transport, Risk of worsening condition, The possibility of a transport vehicle being unavailable      Based on these reasonable risks and benefits to the patient and/or the unborn child(bethany), and based upon the information available at the time of the patient’s examination, I certify that the medical benefits reasonably to be expected from the provision of appropriate medical treatments at another medical facility outweigh the increasing risks, if any, to the individual’s medical condition, and in the case of labor to the unborn child, from effecting the transfer      X____________________________________________ DATE 10/18/22        TIME_______      ORIGINAL - SEND TO MEDICAL RECORDS   COPY - SEND WITH PATIENT DURING TRANSFER

## 2022-10-18 NOTE — ED NOTES
Patient becoming increasingly more agitated, attempting to get out of bed, pulling on kelly and IV catheter  Dr Elisha Rosenthal made aware, non-violent soft restraints and 5 mg haldol ordered       Dana Lanes, RN  10/18/22 8173

## 2022-10-18 NOTE — DISCHARGE INSTRUCTIONS
Kelly Cath Care instructions---Maintain kelly catheter to straight drainage  May use leg bag and shower  May flush TID prn using Indio syringe and 120 ml NSS  May use more saline ad gabriella to prevent/treat cath obstruction  Remove catheter between 10/28-10/31 rehab by 0600 hrs  Bladder scan if no void or less than 200ml in 6hrs  Straight cath for bladder scan >350ml and repeat process  If patient requires straight cath x3 , re-insert kelly and call for Urologist follow-up  Information above  Kelly can remain in place for up to 4 weeks at a time  Kelly placed at [facility] on 10/19/2022        Masoud,    Today you had cystoscopy with placement of a left ureteral stent  This is a small, flexible, plastic tube to help to drain your left kidney  We will arrange for a subsequent ureteroscopic stone intervention at an interval called ureteroscopy with laser lithotripsy  In this procedure a very small camera is placed into your ureter with a laser to break up your stone into dust and small bits  With a stent in place it is not uncommon to have urgency and frequency of urination along with some discomfort when you urinate in your kidney  Pain from this procedure is typically mild  You can take Tylenol as needed for any discomfort that you may have  You are not a candidate for tamsulosin or oxybutynin which are medications that can help with stent pain but can worsen mental function and cause falls    If you have questions or concerns as you recover, please let us know    Thank you for allowing us take care of you today,  Dr Darryl Lancaster

## 2022-10-18 NOTE — ED NOTES
Patient transported to [address] [place] [address],[address], [facility]  10/18/22 1018

## 2022-10-18 NOTE — OP NOTE
OPERATIVE REPORT  PATIENT NAME: Adama Jasso    :  1934  MRN: [id number]  Pt Location: [facility] CYSTO ROOM 01    SURGERY DATE: 10/18/2022    Surgeon(s) and Role:     * Bev Dougherty MD - Primary    Preop Diagnosis:  Ureteral stone [N20 1]    Post-Op Diagnosis Codes:     * Ureteral stone [N20 1]    Procedure(s) (LRB):  CYSTOSCOPY RETROGRADE PYELOGRAM WITH INTERPRETATION AND WITH INSERTION STENT URETERAL (Left)    Specimen(s):  * No specimens in log *    Estimated Blood Loss:   Minimal    Drains:  Urethral Catheter Latex 18 Fr  (Active)   Number of days: 0       Anesthesia Type:   General    Operative Indications:  Ureteral stone [N20 1]  Acute kidney injury  Altered mental status  Complicated UTI    Operative Findings:  Trabeculation of the urinary bladder is noted, severe hydroureteronephrosis is seen  Successful placement of a 6 Western Lola by 26 centimeter left ureteral stent with a good curl in the kidney and within the bladder    Complications:   None    Procedure and Technique:    PROCEDURES PERFORMED:  1) Cystoscopy  2) left retrograde pyelography with fluoroscopic interpretation  3) left ureteral stent placement (6 Western Lola by 26 centimeter)    SURGEON:  Jodie Howe    ASSISTANTS:  None    NOTE:  There were no qualified teaching residents to assist with this case    ANESTHESIA: General     COMPLICATIONS:   None    ANTIBIOTICS:  Ancef    INTRAOPERATIVE THROMBOEMBOLISM PROPHYLAXIS:  Pneumatic compression stockings     RADIOLOGIC FINDINGS:    1  Retrograde pyelogram was performed on the left side using a 5 Fr open ended catheter  A total of 8 milliliters of contrast was necessary today    2  The following findings were noted:   Moderate to severe hydroureteronephrosis is present proximal to the distal left ureteral stone          INDICATIONS FOR PROCEDURE:  Adama Jasso is an 80 y o  old male with a acute kidney injury, altered sensorium, complicated UTI, a distal left ureteral stone and left hydronephrosis  After discussing the options, the patient elected to undergo ureteral stent placement  We discussed the procedure in detail, the alternatives, and the risks, and they signed informed consent to proceed  PROCEDURE IN DETAIL:   The patient was identified and brought to the OR  Antibiotic prophylaxis and DVT prophylaxis were administered  They were placed in the comfortable dorsal lithotomy position with care to pad all pressure points  They were prepped and draped in the usual sterile fashion using hibiclens  A surgical time out was performed with all in the room in agreement with the correct patient, procedure, indications, and laterality  A 21-Congolese rigid cystoscope was used to enter the bladder  The bladder was inspected in its entirety and there were no lesions noted  The ureteral orifices were identified in their orthotopic positions  Mild-to-moderate trabeculation of the bladder is noted    The left ureteral orifice was identified and a 5 Fr open ended catheter was placed into the ureteral orifice  The stone was not visible on  fluoroscopic guidance  A retrograde pyelogram was performed with injection of contrast which demonstrated moderate to severe hydroureteronephrosis  A wire was up to the kidney under fluoroscopic guidance  A 6 Congolese by 26 centimeter left JJ stent was then passed up the wire  under fluoroscopic guidance into the left  kidney with a good curl noted in the kidney and in the bladder  The bladder was drained  The patient was placed back in the supine position, awakened from general anesthesia and brought to the recovery room in stable condition  SPECIMENS:   * No orders in the log *     IMPLANTS:   Implant Name Type Inv   Item Serial No   Lot No  LRB No  Used Action   STENT URETERAL 6 FR 26CM INLAY OPTIMA - [id number]  STENT URETERAL 6 FR 26CM INLAY OPTIMA  [employer] [id number] Left 1 Implanted        COMPLICATIONS: None    DISPOSITION: PACU     PLAN:  The patient is status post placement of a 6 Western Lola by 26 centimeter left ureteral stent and a White catheter  We will arrange for subsequent ureteroscopic stone intervention at an interval   The patient is admitted to the [facility] for management of his complicated UTI and altered mental status    I have communicated the operative findings and the plan going forward to his daughter     I was present for the entire procedure and A qualified resident physician was not available    Patient Disposition:  PACU         SIGNATURE: Florecita Lloyd MD  DATE: October 18, 2022  TIME: 5:25 PM

## 2022-10-19 PROBLEM — Z87.81 HISTORY OF FRACTURE OF RIGHT HIP: Status: ACTIVE | Noted: 2022-10-19

## 2022-10-19 PROBLEM — N17.9 ACUTE KIDNEY INJURY (HCC): Status: RESOLVED | Noted: 2022-10-18 | Resolved: 2022-10-19

## 2022-10-19 PROBLEM — N13.2 HYDRONEPHROSIS WITH URINARY OBSTRUCTION DUE TO RENAL CALCULUS: Status: ACTIVE | Noted: 2022-10-18

## 2022-10-19 PROBLEM — G93.41 ACUTE METABOLIC ENCEPHALOPATHY: Status: ACTIVE | Noted: 2022-10-19

## 2022-10-19 PROBLEM — Z91.81 STATUS POST FALL: Status: ACTIVE | Noted: 2022-10-19

## 2022-10-19 PROBLEM — R33.9 URINARY RETENTION: Status: ACTIVE | Noted: 2022-10-19

## 2022-10-19 PROBLEM — S32.000A LUMBAR COMPRESSION FRACTURE (HCC): Status: ACTIVE | Noted: 2022-10-19

## 2022-10-19 LAB
ANION GAP SERPL CALCULATED.3IONS-SCNC: 7 MMOL/L (ref 4–13)
ATRIAL RATE: 80 BPM
ATRIAL RATE: 83 BPM
BACTERIA UR CULT: NORMAL
BASOPHILS # BLD AUTO: 0.03 THOUSANDS/ÂΜL (ref 0–0.1)
BASOPHILS NFR BLD AUTO: 0 % (ref 0–1)
BUN SERPL-MCNC: 32 MG/DL (ref 5–25)
CALCIUM SERPL-MCNC: 9 MG/DL (ref 8.3–10.1)
CHLORIDE SERPL-SCNC: 107 MMOL/L (ref 96–108)
CO2 SERPL-SCNC: 23 MMOL/L (ref 21–32)
CREAT SERPL-MCNC: 0.98 MG/DL (ref 0.6–1.3)
EOSINOPHIL # BLD AUTO: 0.01 THOUSAND/ÂΜL (ref 0–0.61)
EOSINOPHIL NFR BLD AUTO: 0 % (ref 0–6)
ERYTHROCYTE [DISTWIDTH] IN BLOOD BY AUTOMATED COUNT: 14.4 % (ref 11.6–15.1)
GFR SERPL CREATININE-BSD FRML MDRD: 68 ML/MIN/1.73SQ M
GLUCOSE SERPL-MCNC: 101 MG/DL (ref 65–140)
HCT VFR BLD AUTO: 29.6 % (ref 36.5–49.3)
HGB BLD-MCNC: 9.7 G/DL (ref 12–17)
IMM GRANULOCYTES # BLD AUTO: 0.06 THOUSAND/UL (ref 0–0.2)
IMM GRANULOCYTES NFR BLD AUTO: 1 % (ref 0–2)
LYMPHOCYTES # BLD AUTO: 1.24 THOUSANDS/ÂΜL (ref 0.6–4.47)
LYMPHOCYTES NFR BLD AUTO: 14 % (ref 14–44)
MCH RBC QN AUTO: 33.9 PG (ref 26.8–34.3)
MCHC RBC AUTO-ENTMCNC: 32.8 G/DL (ref 31.4–37.4)
MCV RBC AUTO: 104 FL (ref 82–98)
MONOCYTES # BLD AUTO: 0.82 THOUSAND/ÂΜL (ref 0.17–1.22)
MONOCYTES NFR BLD AUTO: 9 % (ref 4–12)
NEUTROPHILS # BLD AUTO: 6.7 THOUSANDS/ÂΜL (ref 1.85–7.62)
NEUTS SEG NFR BLD AUTO: 76 % (ref 43–75)
NRBC BLD AUTO-RTO: 0 /100 WBCS
P AXIS: 54 DEGREES
P AXIS: 60 DEGREES
PLATELET # BLD AUTO: 382 THOUSANDS/UL (ref 149–390)
PMV BLD AUTO: 9.2 FL (ref 8.9–12.7)
POTASSIUM SERPL-SCNC: 4.4 MMOL/L (ref 3.5–5.3)
PR INTERVAL: 156 MS
PR INTERVAL: 166 MS
QRS AXIS: -14 DEGREES
QRS AXIS: -8 DEGREES
QRSD INTERVAL: 78 MS
QRSD INTERVAL: 88 MS
QT INTERVAL: 364 MS
QT INTERVAL: 390 MS
QTC INTERVAL: 419 MS
QTC INTERVAL: 458 MS
RBC # BLD AUTO: 2.86 MILLION/UL (ref 3.88–5.62)
SODIUM SERPL-SCNC: 137 MMOL/L (ref 135–147)
T WAVE AXIS: 47 DEGREES
T WAVE AXIS: 70 DEGREES
VENTRICULAR RATE: 80 BPM
VENTRICULAR RATE: 83 BPM
WBC # BLD AUTO: 8.86 THOUSAND/UL (ref 4.31–10.16)

## 2022-10-19 PROCEDURE — 99232 SBSQ HOSP IP/OBS MODERATE 35: CPT | Performed by: PHYSICIAN ASSISTANT

## 2022-10-19 PROCEDURE — 93010 ELECTROCARDIOGRAM REPORT: CPT | Performed by: INTERNAL MEDICINE

## 2022-10-19 PROCEDURE — 97163 PT EVAL HIGH COMPLEX 45 MIN: CPT

## 2022-10-19 PROCEDURE — 97535 SELF CARE MNGMENT TRAINING: CPT

## 2022-10-19 PROCEDURE — 99232 SBSQ HOSP IP/OBS MODERATE 35: CPT | Performed by: UROLOGY

## 2022-10-19 PROCEDURE — 80048 BASIC METABOLIC PNL TOTAL CA: CPT | Performed by: INTERNAL MEDICINE

## 2022-10-19 PROCEDURE — 85025 COMPLETE CBC W/AUTO DIFF WBC: CPT | Performed by: INTERNAL MEDICINE

## 2022-10-19 PROCEDURE — 97167 OT EVAL HIGH COMPLEX 60 MIN: CPT

## 2022-10-19 PROCEDURE — 97116 GAIT TRAINING THERAPY: CPT

## 2022-10-19 RX ORDER — OXYCODONE HYDROCHLORIDE 5 MG/1
2.5 TABLET ORAL EVERY 4 HOURS PRN
Status: DISCONTINUED | OUTPATIENT
Start: 2022-10-19 | End: 2022-10-21 | Stop reason: HOSPADM

## 2022-10-19 RX ADMIN — HEPARIN SODIUM 5000 UNITS: 5000 INJECTION INTRAVENOUS; SUBCUTANEOUS at 16:54

## 2022-10-19 RX ADMIN — HEPARIN SODIUM 5000 UNITS: 5000 INJECTION INTRAVENOUS; SUBCUTANEOUS at 05:22

## 2022-10-19 RX ADMIN — PANTOPRAZOLE SODIUM 20 MG: 20 TABLET, DELAYED RELEASE ORAL at 05:22

## 2022-10-19 RX ADMIN — ASPIRIN 81 MG: 81 TABLET, COATED ORAL at 09:07

## 2022-10-19 RX ADMIN — SENNOSIDES AND DOCUSATE SODIUM 1 TABLET: 8.6; 5 TABLET ORAL at 18:22

## 2022-10-19 RX ADMIN — PRAVASTATIN SODIUM 80 MG: 40 TABLET ORAL at 16:54

## 2022-10-19 RX ADMIN — OXYCODONE HYDROCHLORIDE 2.5 MG: 5 TABLET ORAL at 02:27

## 2022-10-19 RX ADMIN — CEFTRIAXONE SODIUM 1000 MG: 10 INJECTION, POWDER, FOR SOLUTION INTRAVENOUS at 09:07

## 2022-10-19 RX ADMIN — LEVOTHYROXINE SODIUM 125 MCG: 125 TABLET ORAL at 05:22

## 2022-10-19 RX ADMIN — OXYCODONE HYDROCHLORIDE 2.5 MG: 5 TABLET ORAL at 18:24

## 2022-10-19 NOTE — ASSESSMENT & PLAN NOTE
Presenting with obstructive uropathy and altered mental status  · UA with small leukocytes and occasional bacteria  · Continue with IV ceftriaxone pending urine culture

## 2022-10-19 NOTE — ASSESSMENT & PLAN NOTE
Initially presented to  UB after fall at home  CT AP on admission noting 1 cm obstructing left ureteral calculus in the pelvic portion of the left ureter with mild left hydronephrosis    Subsequently transferred to [facility] for urology evaluation  · Appreciate urology consult recommendations  · Status post cystoscopy with left ureteral stent placement on 10/18  · Maintain White catheter x 10-14 days with outpatient void trial   · Will require outpatient follow-up for staged stone procedure   · Analgesics and antiemetics as needed

## 2022-10-19 NOTE — ASSESSMENT & PLAN NOTE
Straight cathed for 1500 cc on admission to [facility]  · Status post urologic intervention as above  · White catheter to remain in place on discharge with outpatient void trial as per Urology

## 2022-10-19 NOTE — CASE MANAGEMENT
Case Management Assessment & Discharge Planning Note    Patient name Jocy Murphy  Location [facility] [address]/[facility] 896-30 MRN [id number]  : 1934 Date 10/19/2022       Current Admission Date: 10/18/2022  Current Admission Diagnosis:Hydronephrosis with urinary obstruction due to renal calculus   Patient Active Problem List    Diagnosis Date Noted   • Status post fall 10/19/2022   • Urinary retention 10/19/2022   • Acute metabolic encephalopathy    • History of fracture of right hip 10/19/2022   • Lumbar compression fracture ([address] )    • Complicated UTI (urinary tract infection) 10/18/2022   • Disorientation 10/18/2022   • Hydronephrosis with urinary obstruction due to renal calculus 10/18/2022   • Essential (primary) hypertension 10/08/2022   • Gastro-esophageal reflux disease without esophagitis 10/08/2022   • Hyperlipidemia 10/08/2022   • Polyneuropathy, peripheral sensorimotor axonal    • Hypothyroidism 2007      LOS (days): 1  Geometric Mean LOS (GMLOS) (days): 1 80  Days to GMLOS:0 7     OBJECTIVE:    Risk of Unplanned Readmission Score: 14 04         Current admission status: Inpatient       Preferred Pharmacy: No Pharmacies Listed  Primary Care Provider: Kevyn Baptiste MD    Primary Insurance: [address] [facility] REP  Secondary Insurance:     ASSESSMENT:  [address], [address] [facility] - Daughter   Primary Phone: [phone] (Mobile)               Advance Directives  Does patient have a [address]?: Yes  Does patient have Advance Directives?: Yes  Advance Directives: Living will, Power of  for health care  Primary Contact: Cindy rich [phone]         Readmission Root Cause  30 Day Readmission: No    Patient Information  Admitted from[de-identified] Facility [facility])  Mental Status: Confused  During Assessment patient was accompanied by: Not accompanied during assessment  Assessment information provided by[de-identified] Daughter  Primary Caregiver: Self  Support Systems: Self, Spouse/significant other, Children  What city do you live in?: [address]  In the last 12 months, was there a time when you were not able to pay the mortgage or rent on time?: No  In the last 12 months, how many places have you lived?: 1  In the last 12 months, was there a time when you did not have a steady place to sleep or slept in a shelter (including now)?: No  Homeless/housing insecurity resource given?: N/A  Living Arrangements: Lives w/ Spouse/significant other    Activities of Daily Living Prior to Admission  Functional Status: Assistance  Completes ADLs independently?: No  Level of ADL dependence: Assistance  Ambulates independently?: No  Level of ambulatory dependence: Assistance  Does patient use assisted devices?: No  Does patient currently own DME?: No  Does patient have a history of Outpatient Therapy (PT/OT)?: No  Does the patient have a history of Short-Term Rehab?: Yes [facility])  Does patient have a history of HHC?: No  Does patient currently have [facility]?: No         Patient Information Continued  Income Source: Pension/nursing home  Does patient have prescription coverage?: Yes  Within the past 12 months, you worried that your food would run out before you got the money to buy more : Never true  Within the past 12 months, the food you bought just didn't last and you didn't have money to get more : Never true  Food insecurity resource given?: N/A  Does patient receive dialysis treatments?: No  Does patient have a history of substance abuse?: No  Does patient have a history of Mental Health Diagnosis?: No         Means of Transportation  Means of Transport to Appts[de-identified] Drives Self  In the past 12 months, has lack of transportation kept you from medical appointments or from getting medications?: No  In the past 12 months, has lack of transportation kept you from meetings, work, or from getting things needed for daily living?: No  Was application for public transport provided?: N/A        DISCHARGE DETAILS:    Discharge planning discussed with[de-identified] pt w/ confusion secondary to UTI  TC to daughterLowell to discuss DCP, baseline status  Freedom of Choice: Yes  Comments - Freedom of Choice: Therapy is recommending STR  TC to Lowell rich to discuss same  She reports pt is s/p fall w/ fx R hip s/p surgical repair [facility] and then he was d/c to [facility] for [employer] where his wife is also an inpt  She relates that she is a bit put off b/c she felt like [facility] did not address pt's concern of urinary retention and subsequently ended up confused w/ UTI  She would like blanket referral to STR locally including [facility]   Referalls sent via Aidin  CM contacted family/caregiver?: Yes  Were Treatment Team discharge recommendations reviewed with patient/caregiver?: Yes     Were patient/caregiver advised of the risks associated with not following Treatment Team discharge recommendations?: Yes    Contacts  Patient Contacts: Kris rich  Relationship to Patient[de-identified] Family  Contact Method: Phone  Phone Number: [phone]  Reason/Outcome: Continuity of Care, Emergency Contact, Referral, Discharge Planning         Treatment Team Recommendation: Short Term Rehab  Discharge Destination Plan[de-identified] Short Term Rehab

## 2022-10-19 NOTE — PLAN OF CARE
Problem: OCCUPATIONAL THERAPY ADULT  Goal: Performs self-care activities at highest level of function for planned discharge setting  See evaluation for individualized goals  Description: Treatment Interventions: ADL retraining, Functional transfer training, UE strengthening/ROM, Endurance training, Cognitive reorientation, Patient/family training, Equipment evaluation/education, Compensatory technique education, Continued evaluation, Energy conservation, Activityengagement          See flowsheet documentation for full assessment, interventions and recommendations  Note: Limitation: Decreased ADL status, Decreased UE ROM, Decreased UE strength, Decreased Safe judgement during ADL, Decreased cognition, Decreased endurance, Decreased self-care trans, Decreased high-level ADLs  Prognosis: Fair  Assessment: Pt is a 81 yo male who originally presented to [facility] as pt found on floor w/ reported fall last night in which CT was (-) for acute injuries  Imaging revealed left distal stone with hydronephrosis and distended urinary bladder in which pt was transferred to [facility] for urology eval  Pt s/p "CYSTOSCOPY RETROGRADE PYELOGRAM WITH INTERPRETATION AND WITH INSERTION STENT URETERAL (Left)" on 10/18  Per chart review, pt w/ recent R hip fracture s/p operative repair and is WBAT in RLE w/ hip precautions  Additionally, per chart review, pt w/ chronic L3 -L4 fracture in which pt w/ TLSO and spinal precautions  Pt  has a past medical history of BPH (benign prostatic hyperplasia), Hyperlipidemia, Hypertension, Hypothyroidism, and Peripheral vascular disease ([facility] [address] )  Pt with active OT orders and OT consulted to assess pt's functional status and occupational performance to determine safe d/c needs  Pt lives with his wife in a 1 SH with 2 DUARTE  PTA, pt was independent in ADLs, required assistance w/ IADLs, and reports using no DME PTA, but has access to RW and cane  (+) driving   Currently, pt performing bed mobility w/ Mod A x2, functional transfers w/ Mod A x2 w/ RW, functional mobility w/ Mod A x2 w/ RW, UB ADLs w/ Mod A, and LB ADLs with Max A  Pt demonstrates the following limitations/impairments which impact the pt's ability to engage in valued occupations: cognition, balance, endurance/activity tolerance, postural/trunk control, strength, pain, and safety awareness  From an OT standpoint, recommend discharge to post-acute rehab once medically stable   The patient's raw score on the -PAC Daily Activity inpatient short form is 15, standardized score is 34 69, less than 39 4  Patients at this level are likely to benefit from discharge to post-acute rehabilitation services  Please refer to the recommendation of the Occupational Therapist for safe discharge planning  Pt would benefit from skilled OT services 3-5/wk to address immediate acute care needs and underlying performance skills to promote safety, decrease fall risk, and enhance occupational performance to return to PLOF  Goals to be met within the next 10-14 days       OT Discharge Recommendation: Post acute rehabilitation services

## 2022-10-19 NOTE — ASSESSMENT & PLAN NOTE
Patient with elevated creatinine on admission to 2 21  Baseline creatinine appears to be in the low 1 range  Likely secondary to obstructive stone  Expect improvement post Urology intervention, will trend creatinine  IV fluid hydration

## 2022-10-19 NOTE — PHYSICAL THERAPY NOTE
Physical Therapy Evaluation    Patient's Name: Leeanna Post    Admitting Diagnosis  Left ureteral calculus [N20 1]    Problem List  Patient Active Problem List   Diagnosis    Polyneuropathy, peripheral sensorimotor axonal    Complicated UTI (urinary tract infection)    Disorientation    Hydronephrosis with urinary obstruction due to renal calculus    Essential (primary) hypertension    Gastro-esophageal reflux disease without esophagitis    Hyperlipidemia    Hypothyroidism    Status post fall    Urinary retention    Acute metabolic encephalopathy    History of fracture of right hip    Lumbar compression fracture [facility])       Past Medical History  Past Medical History:   Diagnosis Date    BPH (benign prostatic hyperplasia)     Hyperlipidemia     Hypertension     Hypothyroidism     Peripheral vascular disease ([address] )        Past Surgical History  Past Surgical History:   Procedure Laterality Date    FL RETROGRADE PYELOGRAM  10/18/2022    JOINT REPLACEMENT Right     hip    [place] CYSTOURETHROSCOPY,URETER CATHETER Left 10/18/2022    Procedure: CYSTOSCOPY RETROGRADE PYELOGRAM WITH INTERPRETATION AND WITH INSERTION STENT URETERAL;  Surgeon: Rodo Washington MD;  [facility];  Service: Urology        10/19/22 1150   PT Last Visit   PT Visit Date 10/19/22   Note Type   Note type Evaluation  (+ treatment)   Pain Assessment   Pain Assessment Tool 0-10   Pain Score 4   Pain Location/Orientation Orientation: Right;Location: Hip   Hospital Pain Intervention(s) Repositioned; Ambulation/increased activity;Cold applied   Restrictions/Precautions   Weight Bearing Precautions Per Order Yes   RLE Weight Bearing Per Order WBAT  (recent R hip hemiarthroplasty)   Braces or Orthoses LSO   Other Precautions Cognitive; Chair Alarm; Bed Alarm;THR;Multiple lines; Fall Risk;Pain   Home Living   Type of [address] [place] [address] One level  (2 small DUARTE)   Bathroom Shower/Tub Walk-in shower   Bathroom Toilet Standard   Bathroom Equipment Grab bars in shower   Prior Function   Level of [place] Independent with ADLs; Independent with functional mobility; Needs assistance with IADLS   Lives With Spouse   Receives Help From Home health  (has HHA come 2x/week to make meals, do laundry, + some cleaning)   IADLs Independent with driving; Independent with medication management  (pt was doing the grocery shopping at baseline)   Falls in the last 6 months 1 to 4   Vocational Retired   Comments At baseline pt is I w/ ambulation w/o AD (prior to recent hip fx + repair)  Pt was d/c'ed to [facility] for rehab  General   Family/Caregiver Present No   Cognition   Overall Cognitive Status (S)  Impaired   Arousal/Participation Lethargic  (however increasing alertness once seated EOB)   Attention Attends with cues to redirect   Orientation Level Oriented to person;Oriented to place;Oriented to time;Disoriented to situation   Memory Decreased recall of precautions   Following Commands Follows one step commands with increased time or repetition   Comments overall pleasant + cooperative, improved mentation as session went on, per MD note this is far from pt's cognitive baseline   Subjective   Subjective "I feel as though I'm going to have a BM "   RLE Assessment   RLE Assessment   (grossly 2+/5)   LLE Assessment   LLE Assessment   (grossly 3+/5)   Coordination   Movements are Fluid and Coordinated 0   Coordination and Movement Description slow, guarded movements   Bed Mobility   Supine to Sit 3  Moderate assistance   Additional items Assist x 2;HOB elevated; Increased time required;Verbal cues;LE management   Sit to Supine Unable to assess   Additional Comments Pt greeted in supine  Transfers   Sit to Stand 3  Moderate assistance   Additional items Assist x 2; Increased time required;Verbal cues   Stand to Sit 3  Moderate assistance   Additional items Assist x 2; Increased time required;Verbal cues   Toilet transfer 3  Moderate assistance   Additional items Assist x 2; Increased time required;Verbal cues; Commode  (BSC over toilet, +BM)   Additional Comments RW   Ambulation/Elevation   Gait pattern Excessively slow;Decreased foot clearance; Antalgic; Inconsistent edward;Decreased R stance;Decreased heel strike   Gait Assistance 3  Moderate assist   Additional items Assist x 2;Verbal cues; Tactile cues   Assistive Device Rolling walker   Distance 10'x2   Stair Management Assistance Not tested   Balance   Static Sitting Fair   Dynamic Sitting Fair -   Static Standing Poor +   Dynamic Standing Poor   Ambulatory Poor  (RW)   Endurance Deficit   Endurance Deficit Yes   Endurance Deficit Description pain, weakness, fatigue   Activity Tolerance   Activity Tolerance Patient limited by pain; Patient limited by fatigue   Medical Staff Made Aware MAGDA Martinez   Nurse Made Aware yes - cleared for therapy + updated   Assessment   Prognosis Fair   Problem List Decreased strength;Decreased range of motion;Decreased endurance; Impaired balance;Decreased mobility; Decreased cognition; Impaired judgement;Decreased safety awareness;Orthopedic restrictions;Pain   Assessment Pt is 80 y o  male seen for a high complexity PT evaluation s/p admit to [facility] on 10/18/2022  Pt presenting from rehab w/ hydronephrosis w/ urinary obstruction due to renal calculus  Of note pt w/ recent admission to [facility] s/p fall w/ R hip fx, s/p R hip hemiarthroplasty  Please see above for other active problem list / PMH  PT now consulted to assess functional mobility and needs for safe d/c planning  At baseline pt is I w/ ambulation w/o AD, lives in a ranch house w/ his spouse  Pt unable to discuss recent level of function at rehab due to impaired cognition  Currently pt requires ModAx2 for bed skills; ModAx2 for functional transfers; ModAx2 for ambulation w/ RW   Pt presents functioning below baseline and w/ overall mobility deficits 2* to: decreased LE strength; decreased RLE ROM; generalized weakness/deconditioning; decreased endurance; impaired balance; gait deviations; pain; fatigue; impaired safety and judgement; limited insight into current deficits; bed/chair alarms; multiple lines  Pt currently at risk for falls  (Please find additional objective findings from PT assessment regarding body systems outlined above )     Pt will continue to benefit from skilled PT interventions to address stated impairments; to maximize functional potential; for ongoing pt/ family training; and DME needs  PT is currently recommending rehab  Goals   Patient Goals go to the bathroom   STG Expiration Date 11/02/22   Short Term Goal #1 In 14 days pt will complete: 1) Bed mobility skills with S to facilitate safe return to previous living environment  2) Functional transfers with S to facilitate safe return to previous living environment  3) Ambulation with ' w/ CGA without LOB for safe ambulation in home/community environment  4) Improve balance scores by 1 grade to decrease fall risk  5) Improve LE strength grades by 1 to increase independence w/ all functional mobility, transfers and gait  6) PT for ongoing pt and family education; DME needs and D/C planning to promote highest level of function in least restrictive environment  PT Treatment Day 0   Plan   Treatment/Interventions Functional transfer training;LE strengthening/ROM; Elevations; Therapeutic exercise; Endurance training;Patient/family training;Equipment eval/education; Bed mobility;Gait training; Compensatory technique education;Spoke to nursing;OT  (balance training)   PT Frequency 3-5x/wk   Recommendation   PT Discharge Recommendation Post acute rehabilitation services   AM-PAC Basic Mobility Inpatient   Turning in Bed Without Bedrails 2   Lying on Back to Sitting on Edge of Flat Bed 1   Moving Bed to Chair 1   Standing Up From Chair 1   Walk in Room 1   Climb 3-5 Stairs 1   Basic Mobility Inpatient Raw Score 7   Highest Level Of Mobility   [facility] Goal 2: Bed activities/Dependent transfer   -[facility] Achieved 6: Walk 10 steps or more   Additional Treatment Session   Start Time 1135   End Time 1150   Treatment Assessment Gait training an additional 10' w/ RW w/ ModAx2, cues for sequencing w/ RW, to offload weight into BUE, + increased foot clearance  End of Consult   Patient Position at End of Consult Bedside chair;Bed/Chair alarm activated; All needs within reach  (on waffle cushion, all lines in tact, CP R hip)     Chung Christy, PT, DPT

## 2022-10-19 NOTE — ASSESSMENT & PLAN NOTE
Chronic L3 and L4 compression fractures, stable on CT lumbar spine from 10/11/22 at [facility]  · Continue with TLSO brace   · PT/OT

## 2022-10-19 NOTE — ASSESSMENT & PLAN NOTE
Daughter reporting disorientation over the last several weeks; likely multifactorial in the setting of recent hip surgery, UTI, obstructive uropathy, KARIE  · Provide frequent reorientation and supportive cares  · Currently, patient is somnolent but orientated to self and palce

## 2022-10-19 NOTE — ASSESSMENT & PLAN NOTE
Acute impacted comminuted subcapital femur fracture of the right hip on 10/8/22  Status post operative fixation at [facility]     · Analgesics as needed   · PT/OT

## 2022-10-19 NOTE — OCCUPATIONAL THERAPY NOTE
Occupational Therapy Evaluation and Treatment     Patient Name: Gaurav Vidales  Today's Date: 10/19/2022  Problem List  Principal Problem:    Hydronephrosis with urinary obstruction due to renal calculus  Active Problems:    Complicated UTI (urinary tract infection)    Essential (primary) hypertension    Gastro-esophageal reflux disease without esophagitis    Hyperlipidemia    Hypothyroidism    Status post fall    Urinary retention    Acute metabolic encephalopathy    History of fracture of right hip    Lumbar compression fracture [facility])    Past Medical History  Past Medical History:   Diagnosis Date    BPH (benign prostatic hyperplasia)     Hyperlipidemia     Hypertension     Hypothyroidism     Peripheral vascular disease ([address] )      Past Surgical History  Past Surgical History:   Procedure Laterality Date    FL RETROGRADE PYELOGRAM  10/18/2022    JOINT REPLACEMENT Right     hip    [place] CYSTOURETHROSCOPY,URETER CATHETER Left 10/18/2022    Procedure: CYSTOSCOPY RETROGRADE PYELOGRAM WITH INTERPRETATION AND WITH INSERTION STENT URETERAL;  Surgeon: Meliton Walton MD;  Location[facility];  Service: Urology         10/19/22 1114   OT Last Visit   OT Visit Date 10/19/22   Note Type   Note type Evaluation   Additional Comments Pt seen with PT to increase safety, decrease fall risk, and maximize functional/occupational performance 2* medical complexity which is a regression from pt's functional baseline  Pain Assessment   Pain Assessment Tool 0-10   Pain Score 4   Pain Location/Orientation Orientation: Left; Location: Hip;Location: Leg;Location: Abdomen   Effect of Pain on Daily Activities Impacts ability to engage in valued occupations   Hospital Pain Intervention(s) Repositioned; Ambulation/increased activity; Emotional support   Restrictions/Precautions   Weight Bearing Precautions Per Order Yes   RLE Weight Bearing Per Order WBAT  (Per chart review, pt s/p recent right hip fracture and operative repair /w hip precautions)   Braces or Orthoses TLSO  (Per chart review, pt w/ chronic L3 - L4 fracture w/ spinal precautions and TLSO)   Other Precautions Cognitive; Chair Alarm; Bed Alarm;WBS;THR;Multiple lines;Telemetry; Fall Risk;Pain;Spinal precautions   Home Living   Type of Home House  (1  with 2 DUARTE)   Home Layout One level;Performs ADLs on one level; Able to live on main level with bedroom/bathroom; Access   Bathroom Shower/Tub Walk-in shower   Bathroom Toilet Standard   Bathroom Equipment Grab bars in shower   Bathroom Accessibility Accessible   Home Equipment Walker;Cane  (Per pt report, does not use PTA; has access to them from his wife)   Additional Comments Pt reports living in 1  with 2 DUARTE with his wife; reports using no DME PTA, but has access to RW and cane   Prior Function   Level of [place] Independent with ADLs; Independent with functional mobility; Needs assistance with IADLS   Lives With Spouse   Receives Help From Personal care attendant   IADLs Independent with driving; Independent with medication management; Family/Friend/Other provides meals   Falls in the last 6 months 1 to 4  (1, this admission)   Vocational Retired   Comments (+) driving; pt reports that they have a lady that comes 2x/week to assist with cooking and cleaning; reports that he is independent w/ medication management   Lifestyle   Autonomy PTA, pt was independent in ADLs, requires some assistance w/ IADLs, but is able to manage medications independently, and uses no DME PTA; (+) driving   Reciprocal Relationships Lives with his wife   Service to Others Retired   Godfrey Redmond Enjoys watching TV   Subjective   Subjective "I have to use the bathroom "   ADL   Where Assessed Edge of bed   Eating Assistance 5  Supervision/Setup   Grooming Assistance 4  Minimal Assistance   UB Bathing Assistance 3  Moderate Assistance   LB Bathing Assistance 2  Maximal Assistance    [address] 3  Moderate Assistance   LB Dressing 76 YO F with MHx of HTN and Chronic Lymphedema who presents with increased drainage from her RLE concerning for possible cellulitis     Lymphedema.    -pt with chronic lymphedema  -Va duplex to r/o clot - done  -Pnecrotic tissue with lymphedema excised RLE, closure with interrupted nylon sutures and application of wound vac  - s/p Debridement and excision of soft tissue, closure and  application of wound vac 125mmHg    Cellulitis.   - Pt with moderate to severe purulent cellulitis with necrotic tissue reported on outside imaging.   cont  ceftriaxone  and vancomycin   -f/u trough.  - ID consult following    anemia  - likely 2/2 acute blood loss  - transfuse 2 more unit  - follow up H&H in am  - keep hgb above 8      Hypertension.   - c/w losartan.    pain  - oxycodone prn    bowl regimen  - senna and miralalx     Prophylactic measure.   -  DVT Ppx  -Lovenox, Hold before OR.   Assistance 2  Maximal Assistance   Toileting Assistance  1  Total Assistance   Toileting Deficit Setup;Steadying;Verbal cueing;Supervison/safety; Increased time to complete;Clothing management up;Clothing management down;Perineal hygiene;Grab bar use   Functional Assistance 3  Moderate Assistance   Bed Mobility   Supine to Sit 3  Moderate assistance   Additional items Assist x 2;HOB elevated; Bedrails; Increased time required;Verbal cues;LE management   Sit to Supine Unable to assess   Additional Comments Pt performed supine <> sit with Mod A x2 for UB postural support and LE management; @ end of session, pt left sitting upright in recliner with all functional needs in reach   Transfers   Sit to Stand 3  Moderate assistance   Additional items Assist x 2; Increased time required;Verbal cues   Stand to Sit 3  Moderate assistance   Additional items Assist x 2; Increased time required;Verbal cues   Toilet transfer 3  Moderate assistance   Additional items Assist x 2; Increased time required;Armrests; Verbal cues;Raised toilet seat   Additional Comments w/ RW for safety and support w/ verbal cues for safety, proper hand placement, and technique   Functional Mobility   Functional Mobility 3  Moderate assistance   Additional Comments Pt completed household functional mobility <> bathroom with Mod A x2 w/ RW for safety and support with verbal cues for safety, proper technique, RW management, and LE sequencing/management   Additional items Rolling walker   Balance   Static Sitting Fair   Dynamic Sitting Fair -   Static Standing Poor +   Dynamic Standing Poor   Ambulatory Poor   Activity Tolerance   Activity Tolerance Patient tolerated treatment well;Patient limited by fatigue;Patient limited by pain   Medical Staff Made Aware DPT Teresa   Nurse Made Aware RN cleared for OT eval   RUE Assessment   RUE Assessment WFL  (grossly 3+/5 MMT)   LUE Assessment   LUE Assessment WFL  (grossly 3+/5 MMT)   Hand Function   Gross Motor Coordination Functional   Fine Motor Coordination Functional   Proprioception   Proprioception No apparent deficits   Vision - Complex Assessment   Ocular Range of Motion Intact   Psychosocial   Psychosocial (WDL) WDL   Patient Behaviors/Mood Calm   Perception   Inattention/Neglect Appears intact   Cognition   Overall Cognitive Status (S)  Impaired   Arousal/Participation Lethargic;Persistent stimuli required; Cooperative   Attention Attends with cues to redirect   Orientation Level Oriented to person;Oriented to place;Oriented to time;Disoriented to situation   Memory Decreased recall of precautions   Following Commands Follows one step commands with increased time or repetition   Comments Pt presents with flat affect w/ lethargy as pt required ongoing verbal/tactile cues for arousal in beginning of session; pt disoriented to situation; requiring verbal cues for safety,proper technique, and sequencing t/o functional activity; presenting with decreased safety awareness/insight and decreased recall of precautions; will continue to assess as able   Assessment   Limitation Decreased ADL status; Decreased UE ROM; Decreased UE strength;Decreased Safe judgement during ADL;Decreased cognition;Decreased endurance;Decreased self-care trans;Decreased high-level ADLs   Prognosis Fair   Assessment Pt is a 79 yo male who originally presented to [facility] as pt found on floor w/ reported fall last night in which CT was (-) for acute injuries  Imaging revealed left distal stone with hydronephrosis and distended urinary bladder in which pt was transferred to [facility] for urology eval  Pt s/p "CYSTOSCOPY RETROGRADE PYELOGRAM WITH INTERPRETATION AND WITH INSERTION STENT URETERAL (Left)" on 10/18  Per chart review, pt w/ recent R hip fracture s/p operative repair and is WBAT in RLE w/ hip precautions  Additionally, per chart review, pt w/ chronic L3 -L4 fracture in which pt w/ TLSO and spinal precautions   Pt  has a past medical history of BPH (benign prostatic hyperplasia), Hyperlipidemia, Hypertension, Hypothyroidism, and Peripheral vascular disease ([facility] [address] )  Pt with active OT orders and OT consulted to assess pt's functional status and occupational performance to determine safe d/c needs  Pt lives with his wife in a 1 SH with 2 DUARTE  PTA, pt was independent in ADLs, required assistance w/ IADLs, and reports using no DME PTA, but has access to RW and cane  (+) driving  Currently, pt performing bed mobility w/ Mod A x2, functional transfers w/ Mod A x2 w/ RW, functional mobility w/ Mod A x2 w/ RW, UB ADLs w/ Mod A, and LB ADLs with Max A  Pt demonstrates the following limitations/impairments which impact the pt's ability to engage in valued occupations: cognition, balance, endurance/activity tolerance, postural/trunk control, strength, pain, and safety awareness  From an OT standpoint, recommend discharge to post-acute rehab once medically stable   The patient's raw score on the AM-PAC Daily Activity inpatient short form is 15, standardized score is 34 69, less than 39 4  Patients at this level are likely to benefit from discharge to post-acute rehabilitation services  Please refer to the recommendation of the Occupational Therapist for safe discharge planning  Pt would benefit from skilled OT services 3-5/wk to address immediate acute care needs and underlying performance skills to promote safety, decrease fall risk, and enhance occupational performance to return to PLOF  Goals to be met within the next 10-14 days  Goals   Patient Goals To go to the bathroom   LTG Time Frame 10-14   Long Term Goal #1 See OT goals listed below   Plan   Treatment Interventions ADL retraining;Functional transfer training;UE strengthening/ROM; Endurance training;Cognitive reorientation;Patient/family training;Equipment evaluation/education; Compensatory technique education;Continued evaluation; Energy conservation; Activityengagement   Goal Expiration Date 11/02/22   OT Frequency 3-5x/wk   Recommendation   OT Discharge Recommendation Post acute rehabilitation services   AM-PAC Daily Activity Inpatient   Lower Body Dressing 2   Bathing 2   Toileting 2   Upper Body Dressing 2   Grooming 3   Eating 4   Daily Activity Raw Score 15   Daily Activity Standardized Score (Calc for Raw Score >=11) 34 69   AM-PAC Applied Cognition Inpatient   Following a Speech/Presentation 2   Understanding Ordinary Conversation 3   Taking Medications 2   Remembering Where Things Are Placed or Put Away 3   Remembering List of 4-5 Errands 2   Taking Care of Complicated Tasks 2   Applied Cognition Raw Score 14   Applied Cognition Standardized Score 32 02   Additional Treatment Session   Start Time 1140   End Time 1152   Treatment Assessment Pt seen for additional OT treatment on this date  Pt completed STS with Mod A x2 w/ RW for safety and support and completed functional mobility <> bathroom with Mod A x2 w/ RW in which pt required verbal cues for proper technique, RW management, and LE advancement/sequencing  Pt completed toilet transfer w/ raised toilet seat and Mod A x2 w/ use of grab bars for safety/support and required total assistance for posterior hygiene in standing 2* decreased functional reach and dynamic standing balance  In standing, pt requiring verbal cues for upright posture w/ RW for steadying/support  Pt completed functional mobility <> recliner with Mod A x2 w/ RW and was left sitting upright with all functional needs in reach  Continue to recommend post-acute rehab upon d/c  Additional Treatment Day 1     OT GOALS:    Pt will improve functional mobility during ADL/IADL/leisure tasks with supervision using AE/DME prn  Pt will improve activity tolerance/functional endurance during ADL/IADL/leisure tasks for at least 20 minutes to improve occupational performance and engagement in valued occupations using AE/DME prn      Pt will engage in ongoing functional/formal cognitive assessments to assist with safe d/c planning and increase safety during functional tasks  Pt will be A/Ox4 100% of the time and follow at least 2 step commands during functional activities with no verbal cues to increase attention, safety, and engagement in ADL/IADL/leisure tasks  Pt will participate in simulated IADL management task w/ supervision to increase independence and engagement in valued occupations w/ good balance/safety  Pt will improve dynamic standing balance for at least 8 minutes with supervision during functional tasks to decrease fall risk and improve independence and engagement in ADL/IADL/leisure activities  Pt will complete functional transfers on and off all surfaces used in daily routines with supervision for safety to maximize functional/occupational performance  Pt will complete all bed mobility tasks with Mod I to serve as a prerequisite for EOB/OOB ADL/IADL/leisure tasks, optimize positioning/comfort, and increase functional independence  Pt will independently demonstrate good carryover of hip and spinal precautions and pt/caregiver education/training during ADL/IADL/leisure tasks with energy conservation techniques s/p skilled instruction without verbal cues  Pt will increase UE/LE MMT strength by 1/2 grade to improve bilateral coordination in ADL/IADL/leisure tasks with S  Pt will complete UB ADL tasks with Mod I using AE/DME prn to increase functional independence in ADL/IADL/leisure tasks  Pt will complete LB ADL tasks with Mod I using AE/DME prn to increase functional independence in ADL/IADL/leisure tasks  Pt will complete toileting tasks with Mod I and good hygiene/thoroughness using AE/DME prn to increase functional independence  Pt will independently identify and utilize 2-3 positive coping strategies to enhance overall wellbeing and engagement in valued occupations      Crissy Thomas, MS, OTR/L

## 2022-10-19 NOTE — H&P
[address] [facility]  H&P- Barbara Jensen 1934, 80 y o  male MRN: [id number]  Unit/Bed#: [facility] 316-01 Encounter: [id number]  Primary Care Provider: Nora Manzano MD   Date and time admitted to hospital: 10/18/2022  4:21 PM    Acute kidney injury [facility])  Assessment & Plan  Patient with elevated creatinine on admission to 2 21  Baseline creatinine appears to be in the low 1 range  Likely secondary to obstructive stone  Expect improvement post Urology intervention, will trend creatinine  IV fluid hydration      Hypothyroidism  Assessment & Plan  Continue levothyroxine 125 mcg    Hyperlipidemia  Assessment & Plan  Continue statin    Gastro-esophageal reflux disease without esophagitis  Assessment & Plan  Continue home Prilosec    Essential (primary) hypertension  Assessment & Plan  Hold outpatient losartan due to acute kidney injury secondary to obstructive stone    Complicated UTI (urinary tract infection)  Assessment & Plan  Will continue on ceftriaxone pending urine culture      * Left ureteral stone  Assessment & Plan  Patient found to have left ureteral stone with hydronephrosis / now status post cystoscopy with left ureteral stent placement  White catheter with slight pink tinge to urine  Trend creatinine  Urology consult  Pain control        VTE Pharmacologic Prophylaxis: VTE Score: 5 High Risk (Score >/= 5) - Pharmacological DVT Prophylaxis Ordered: heparin  Sequential Compression Devices Ordered  Code Status: Level 1 - Full Code   Discussion with family: Updated  (daughter) via phone  Anticipated Length of Stay: Patient will be admitted on an inpatient basis with an anticipated length of stay of greater than 2 midnights secondary to Acute kidney injury, urinary tract infection, obstructive stone      Total Time for Visit, including Counseling / Coordination of Care: 30 minutes Greater than 50% of this total time spent on direct patient counseling and coordination of care  Chief Complaint:  Urinary retention, fall    History of Present Illness:  Lamar Bell is a 80 y o  male   This is an 77-year-old male with a past medical history significant for hypertension, hypothyroidism, GERD, compression fractures, BPH, right carotid stenosis on aspirin  Patient was brought into the [address] [place] [address] by way of EMS from [facility] after he was found on the floor this morning with a reported fall last night  CT head was unremarkable  He was Found to have urinary retention with straight cath producing 1500 cc of urine  Imaging in the ER showed a 1 cmleft distal stone with hydronephrosis and distended urinary bladder  Patient transferred for urology evaluation now status post cystoscopy with left ureteral stent placement  He was noted to be disoriented by Urology prior  He is alert to self and place but not to year during my evaluation  Suspect some minor confusion likely secondary to infection and postop status/pain  Of note patient recently had a fall resulting in hip fracture which is repaired with hemo arthroplasty recently  Review of Systems:  Review of Systems   Unable to perform ROS: Mental status change       Past Medical and Surgical History:   Past Medical History:   Diagnosis Date   • BPH (benign prostatic hyperplasia)    • Hyperlipidemia    • Hypertension    • Hypothyroidism    • Peripheral vascular disease ([facility] [address] )        Past Surgical History:   Procedure Laterality Date   • JOINT REPLACEMENT Right     hip       Meds/Allergies:  Prior to Admission medications    Medication Sig Start Date End Date Taking?  Authorizing Provider   aspirin (ECOTRIN LOW STRENGTH) 81 mg EC tablet Take 81 mg by mouth    Historical Provider, MD   levothyroxine 125 mcg tablet Take 125 mcg by mouth daily 10/3/22   Historical Provider, MD   losartan (COZAAR) 50 mg tablet Take 1 tablet by mouth daily 5/16/22   Historical Provider, MD   omeprazole (PriLOSEC) 20 mg delayed release capsule take 1 capsule by oral route  every day 30 minutes before breakfast 4/26/22   Historical Provider, MD   senna-docusate sodium (SENOKOT S) 8 6-50 mg per tablet Take 1 tablet by mouth every evening 10/14/22 10/14/23  Historical Provider, MD   simvastatin (ZOCOR) 40 mg tablet Take 1 tablet by mouth daily 10/6/22   Historical Provider, MD LARIOS have reviewed home medications using recent Epic encounter  Allergies: No Known Allergies    Social History:  Marital Status: /Civil Union   Occupation:   Patient Pre-hospital Living Situation:  [facility]  Patient Pre-hospital Level of Mobility: unable to be assessed at time of evaluation  Patient Pre-hospital Diet Restrictions:   Substance Use History:   Social History     Substance and Sexual Activity   Alcohol Use None     Social History     Tobacco Use   Smoking Status Not on file   Smokeless Tobacco Not on file     Social History     Substance and Sexual Activity   Drug Use Not on file       Family History:  No family history on file  Physical Exam:     Vitals:   Blood Pressure: 140/60 (10/18/22 2107)  Pulse: 84 (10/18/22 1945)  Temperature: 98 3 °F (36 8 °C) (10/18/22 1730)  Respirations: 20 (10/18/22 1945)  SpO2: 95 % (10/18/22 1945)    Physical Exam  Vitals and nursing note reviewed  Constitutional:       General: He is not in acute distress  Appearance: He is well-developed  He is not ill-appearing, toxic-appearing or diaphoretic  HENT:      Head: Normocephalic and atraumatic  Eyes:      General: No scleral icterus  Conjunctiva/sclera: Conjunctivae normal    Cardiovascular:      Rate and Rhythm: Normal rate and regular rhythm  Heart sounds: No murmur heard  No friction rub  No gallop  Pulmonary:      Effort: Pulmonary effort is normal  No respiratory distress  Breath sounds: Normal breath sounds  No stridor  No wheezing, rhonchi or rales  Chest:      Chest wall: No tenderness  Abdominal:      General: There is no distension  Palpations: Abdomen is soft  There is no mass  Tenderness: There is no abdominal tenderness  There is no guarding or rebound  Hernia: No hernia is present  Genitourinary:     Comments: White in place with pink tinged urine  Musculoskeletal:         General: No swelling, tenderness, deformity or signs of injury  Cervical back: Neck supple  Skin:     General: Skin is warm and dry  Coloration: Skin is not jaundiced or pale  Neurological:      Mental Status: He is alert  Comments: Oriented to person place but not to year          Additional Data:     Lab Results:  Results from last 7 days   Lab Units 10/18/22  1059   WBC Thousand/uL 9 97   HEMOGLOBIN g/dL 10 6*   HEMATOCRIT % 32 3*   PLATELETS Thousands/uL 330   NEUTROS PCT % 82*   LYMPHS PCT % 9*   MONOS PCT % 8   EOS PCT % 0     Results from last 7 days   Lab Units 10/18/22  1010   SODIUM mmol/L 132*   POTASSIUM mmol/L 4 6   CHLORIDE mmol/L 100   CO2 mmol/L 24   BUN mg/dL 50*   CREATININE mg/dL 2 21*   ANION GAP mmol/L 8   CALCIUM mg/dL 9 5   ALBUMIN g/dL 2 6*   TOTAL BILIRUBIN mg/dL 1 50*   ALK PHOS U/L 84   ALT U/L 44   AST U/L 44   GLUCOSE RANDOM mg/dL 125     Results from last 7 days   Lab Units 10/18/22  1059   INR  1 14                   Imaging: Reviewed radiology reports from this admission including: abdominal/pelvic CT and CT head  FL retrograde pyelogram    (Results Pending)       EKG and Other Studies Reviewed on Admission:   · EKG: No EKG obtained  ** Please Note: This note has been constructed using a voice recognition system   **

## 2022-10-19 NOTE — ASSESSMENT & PLAN NOTE
Presented after fall at [facility] as above  · Note, recent right hip fracture status post operative repair earlier this month at [facility]  · PT/OT evaluations pending

## 2022-10-19 NOTE — PROGRESS NOTES
Progress Note - urology  Kavin Palumbo 80 y o  male MRN: [id number]  Unit/Bed#: [facility] 316-01 Encounter: [id number]    Assessment &Plan:    Nephrolithiasis and urinary retention:  -CT scan reveals 1 cm obstructing left ureteral calculus in the pelvic portion of the left ureter with mild left hydronephrosis  Nonobstructing 4 mm calculus in left lower pole  Distended urinary bladder with mild prominence of the right renal pelvis left renal pelvis and dilated ureter extending to the urinary bladder   -patient status post White catheter insertion with over 1 L at time of insertion  -now status post ureteral stent insertion on the left with Dr Robbie Alcantar overnight  -KARIE of 2 21 on admission now resolved at 0 98  -no leukocytosis  -patient afebrile vital stable  -UA not grossly positive, cultures currently pending, treat empirically, just piece of culture data   -patient will require 2nd definitive stone treatment for removal of ureteral stone on an outpatient basis  Discussed with patient he is aware that you will require 2nd procedure   -encourage ambulation  -provided good bowel regimen  -would recommend maintaining urethral White catheter for 10-14 days, may be removed at rehab facility or nursing facility  Looks as though he came from quite your [facility] and have the ability to perform straight catheterization of void trials  Instructions will be left in patient's discharge summary  Or patient may get in contact with us for void trial in our office   -would not recommend Flomax and patient with active falls  Can repeat ultrasound on outpatient for re-evaluation of hydronephrosis  Expect for this have improved as renal function has significantly improved  No further  intervention required this admission  Urology will sign off  We was available for additional questions or concerns regarding this patient    Patient require 2nd definitive stone treatment, our office will contact patient on discharge to schedule  Recommend void trial at outpatient facility  Subjective/Objective   Chief Complaint:  None    Subjective:   Patient currently sitting comfortably in bed no acute distress  He denies any abdominal pain or flank or discomfort with urethral White catheter or ureteral stent  Objective:     Blood pressure 114/64, pulse 78, temperature 97 9 °F (36 6 °C), temperature source Oral, resp  rate 18, SpO2 95 %  ,There is no height or weight on file to calculate BMI  Intake/Output Summary (Last 24 hours) at 10/19/2022 0956  Last data filed at 10/19/2022 1353  Gross per 24 hour   Intake 600 ml   Output 1200 ml   Net -600 ml       Invasive Devices  Report    Peripheral Intravenous Line  Duration           Peripheral IV 10/18/22 Right Antecubital <1 day          Drain  Duration           Urethral Catheter Latex 18 Fr  <1 day                Physical Exam  Constitutional:       General: He is not in acute distress  Appearance: He is normal weight  He is not ill-appearing, toxic-appearing or diaphoretic  HENT:      Head: Normocephalic and atraumatic  Right Ear: External ear normal       Left Ear: External ear normal       Nose: Nose normal       Mouth/Throat:      Pharynx: Oropharynx is clear  Eyes:      General: No scleral icterus  Conjunctiva/sclera: Conjunctivae normal    Cardiovascular:      Rate and Rhythm: Normal rate and regular rhythm  Pulses: Normal pulses  Heart sounds: No murmur heard  No friction rub  No gallop  Pulmonary:      Effort: Pulmonary effort is normal  No respiratory distress  Breath sounds: No wheezing, rhonchi or rales  Abdominal:      General: Bowel sounds are normal  There is no distension  Tenderness: There is no abdominal tenderness  There is no right CVA tenderness or left CVA tenderness        Comments: Back brace on   Genitourinary:     Comments: Urethral White catheter in place draining clear yellow urine  Musculoskeletal: General: Normal range of motion  Cervical back: Normal range of motion  Skin:     General: Skin is warm and dry  Neurological:      Mental Status: He is alert  Mental status is at baseline  Lab, Imaging and other studies:I have personally reviewed pertinent lab results     Lab Results   Component Value Date    WBC 8 86 10/19/2022    HGB 9 7 (L) 10/19/2022    HCT 29 6 (L) 10/19/2022     (H) 10/19/2022     10/19/2022     Lab Results   Component Value Date    SODIUM 137 10/19/2022    K 4 4 10/19/2022     10/19/2022    CO2 23 10/19/2022    BUN 32 (H) 10/19/2022    CREATININE 0 98 10/19/2022    GLUC 101 10/19/2022    CALCIUM 9 0 10/19/2022        VTE Pharmacologic Prophylaxis: Heparin  VTE Mechanical Prophylaxis: sequential compression device    Melina Marroquin

## 2022-10-19 NOTE — ASSESSMENT & PLAN NOTE
Patient found to have left ureteral stone with hydronephrosis / now status post cystoscopy with left ureteral stent placement    White catheter with slight pink tinge to urine  Trend creatinine  Urology consult  Pain control

## 2022-10-19 NOTE — PLAN OF CARE
Problem: PHYSICAL THERAPY ADULT  Goal: Performs mobility at highest level of function for planned discharge setting  See evaluation for individualized goals  Description: Treatment/Interventions: Functional transfer training, LE strengthening/ROM, Elevations, Therapeutic exercise, Endurance training, Patient/family training, Equipment eval/education, Bed mobility, Gait training, Compensatory technique education, Spoke to nursing, OT (balance training)          See flowsheet documentation for full assessment, interventions and recommendations  Note: Prognosis: Fair  Problem List: Decreased strength, Decreased range of motion, Decreased endurance, Impaired balance, Decreased mobility, Decreased cognition, Impaired judgement, Decreased safety awareness, Orthopedic restrictions, Pain  Assessment: Pt is 80 y o  male seen for a high complexity PT evaluation s/p admit to [facility] on 10/18/2022  Pt presenting from rehab w/ hydronephrosis w/ urinary obstruction due to renal calculus  Of note pt w/ recent admission to [facility] s/p fall w/ R hip fx, s/p R hip hemiarthroplasty  Please see above for other active problem list / PMH  PT now consulted to assess functional mobility and needs for safe d/c planning  At baseline pt is I w/ ambulation w/o AD, lives in a ranch house w/ his spouse  Pt unable to discuss recent level of function at rehab due to impaired cognition  Currently pt requires ModAx2 for bed skills; ModAx2 for functional transfers; ModAx2 for ambulation w/ RW  Pt presents functioning below baseline and w/ overall mobility deficits 2* to: decreased LE strength; decreased RLE ROM; generalized weakness/deconditioning; decreased endurance; impaired balance; gait deviations; pain; fatigue; impaired safety and judgement; limited insight into current deficits; bed/chair alarms; multiple lines  Pt currently at risk for falls   (Please find additional objective findings from PT assessment regarding body systems outlined above ) Pt will continue to benefit from skilled PT interventions to address stated impairments; to maximize functional potential; for ongoing pt/ family training; and DME needs  PT is currently recommending rehab  PT Discharge Recommendation: Post acute rehabilitation services    See flowsheet documentation for full assessment

## 2022-10-19 NOTE — UTILIZATION REVIEW
Initial Clinical Review    Admission: Date/Time/Statement:   Admission Orders (From admission, onward)     Ordered        10/18/22 2105  Inpatient Admission  Once                      Orders Placed This Encounter   Procedures   • Inpatient Admission     Standing Status:   Standing     Number of Occurrences:   1     Order Specific Question:   Level of Care     Answer:   Med Surg [16]     Order Specific Question:   Estimated length of stay     Answer:   More than 2 Midnights     Order Specific Question:   Certification     Answer:   I certify that inpatient services are medically necessary for this patient for a duration of greater than two midnights  See H&P and MD Progress Notes for additional information about the patient's course of treatment  Initial Presentation: 80 y o  male with PMH of hypertension, hypothyroidism, GERD, compression fractures, BPH, right carotid stenosis on aspirin  Patient was brought into the [facility] by way of EMS from [facility] after he was found on the floor this morning with a reported fall last night  CT head was unremarkable  Pt found to have urinary retention with straight cath producing 1500 cc of urine  Imaging in the ER showed a 1 cmleft distal stone with hydronephrosis and distended urinary bladder  Patient transferred to [address] [place] [address] for urology evaluation now status post cystoscopy with left ureteral stent placement  He was noted to be disoriented by Urology prior  Admit Inpatient med surg d/t Left ureteral stone, KARIE, Hydronephrosis with urinary obstruction due to renal calculus:  Urology consult, trend Cr, pain control, White catheter, give IVF, continue home meds except for losartan dt KARIE  Continue IV ABx      10/18 OP Note:  Procedure(s) (LRB):  CYSTOSCOPY RETROGRADE PYELOGRAM WITH INTERPRETATION AND WITH INSERTION STENT URETERAL (Left)   Anesthesia Type:   General  Operative Findings:  Trabeculation of the urinary bladder is noted, severe hydroureteronephrosis is seen  Successful placement of a 6 [place] by 26 centimeter left ureteral stent with a good curl in the kidney and within the bladder    Date: 10/19   Day 2:   Pt briefly arouses to name calling but falls back to sleep quickly  He is able to tell me his name and that he's in the hospital    Urology following, maintain White for 10-14 days w/ outpt void trial, continue pain control, continue IV ABX, fu on urine cx, PT/OT eval pending, monitor labs, continue home meds      ED Triage Vitals   Temperature Pulse Respirations Blood Pressure SpO2   10/18/22 1730 10/18/22 1730 10/18/22 1730 10/18/22 1730 10/18/22 1730   98 3 °F (36 8 °C) 88 20 133/68 100 %      Temp Source Heart Rate Source Patient Position - Orthostatic VS BP Location FiO2 (%)   10/18/22 2339 10/18/22 2339 10/18/22 2339 10/18/22 2107 --   Axillary Monitor Lying Left arm       Pain Score       10/18/22 1815       No Pain          Wt Readings from Last 1 Encounters:   10/18/22 74 8 kg (165 lb)     Additional Vital Signs:   Date/Time Temp Pulse Resp BP MAP (mmHg) SpO2 O2 Flow Rate (L/min) O2 Device Cardiac (WDL)   10/19/22 0756 97 9 °F (36 6 °C) 78 18 114/64 -- 95 % -- None (Room air) --   10/19/22 0022 98 5 °F (36 9 °C) 88 18 112/57 82 95 % -- None (Room air) --   10/18/22 2339 98 7 °F (37 1 °C) 72 18 144/56 90 97 % -- None (Room air) --   10/18/22 2107 -- -- -- 140/60 -- -- -- -- --   10/18/22 1945 -- 84 20 143/67 107 95 % -- None (Room air) --   10/18/22 1930 -- 86 20 114/70 83 93 % -- None (Room air) X   10/18/22 1915 -- 84 20 150/59 89 95 % -- None (Room air) --   10/18/22 1900 -- 86 20 142/72 85 98 % -- None (Room air) --   10/18/22 1845 -- 88 20 134/62 103 97 % -- None (Room air) --   10/18/22 1830 -- 90 20 152/80 117 96 % -- None (Room air) X   10/18/22 1815 -- 90 20 127/57 87 96 % -- None (Room air) --   10/18/22 1800 -- 94 20 145/72 105 97 % -- None (Room air) --   10/18/22 1745 -- 92 20 122/87 109 100 % 6 L/min Simple mask --   10/18/22 1730 98 3 °F (36 8 °C) 88 20 133/68 95 100 % 6 L/min Simple mask X       Pertinent Labs/Diagnostic Test Results:   10/18 EKG:  NSR    FL retrograde pyelogram   Final Result by Elizabeth Camarena MD (10/19 1001)      Fluoroscopic guidance provided for left retrograde pyelogram  Please see procedure report for further details        Workstation performed: [id number]               Results from last 7 days   Lab Units 10/19/22  0552 10/18/22  1059   WBC Thousand/uL 8 86 9 97   HEMOGLOBIN g/dL 9 7* 10 6*   HEMATOCRIT % 29 6* 32 3*   PLATELETS Thousands/uL 382 330   NEUTROS ABS Thousands/µL 6 70 8 14*         Results from last 7 days   Lab Units 10/19/22  0552 10/18/22  1010   SODIUM mmol/L 137 132*   POTASSIUM mmol/L 4 4 4 6   CHLORIDE mmol/L 107 100   CO2 mmol/L 23 24   ANION GAP mmol/L 7 8   BUN mg/dL 32* 50*   CREATININE mg/dL 0 98 2 21*   EGFR ml/min/1 73sq m 68 25   CALCIUM mg/dL 9 0 9 5     Results from last 7 days   Lab Units 10/18/22  1010   AST U/L 44   ALT U/L 44   ALK PHOS U/L 84   TOTAL PROTEIN g/dL 6 5   ALBUMIN g/dL 2 6*   TOTAL BILIRUBIN mg/dL 1 50*   BILIRUBIN DIRECT mg/dL 0 49*         Results from last 7 days   Lab Units 10/19/22  0552 10/18/22  1010   GLUCOSE RANDOM mg/dL 101 125     Results from last 7 days   Lab Units 10/18/22  1509 10/18/22  1059   HS TNI 0HR ng/L  --  12   HS TNI 2HR ng/L 12  --    HSTNI D2 ng/L 0  --          Results from last 7 days   Lab Units 10/18/22  1059   PROTIME seconds 15 4*   INR  1 14   PTT seconds 41*     Results from last 7 days   Lab Units 10/18/22  1107   CLARITY UA  Clear   COLOR UA  Yellow   SPEC GRAV UA  1 025   PH UA  5 5   GLUCOSE UA mg/dl Negative   KETONES UA mg/dl Negative   BLOOD UA  Small*   PROTEIN UA mg/dl Trace*   NITRITE UA  Negative   BILIRUBIN UA  Negative   UROBILINOGEN UA (BE) mg/dl <2 0   LEUKOCYTES UA  Small*   WBC UA /hpf 10-20*   RBC UA /hpf 2-4   BACTERIA UA /hpf Occasional   EPITHELIAL CELLS WET PREP /hpf None Seen   MUCUS THREADS  None Seen     Results from last 7 days   Lab Units 10/18/22  1107   URINE CULTURE  No Growth <1000 cfu/mL     ED Treatment:   Medication Administration - No Administrations Displayed (No Start Event Found)     None        Past Medical History:   Diagnosis Date   • BPH (benign prostatic hyperplasia)    • Hyperlipidemia    • Hypertension    • Hypothyroidism    • Peripheral vascular disease (HCC)      Present on Admission:  • Complicated UTI (urinary tract infection)  • Hydronephrosis with urinary obstruction due to renal calculus  • Hyperlipidemia  • Hypothyroidism  • Gastro-esophageal reflux disease without esophagitis  • Essential (primary) hypertension  • (Resolved) Acute kidney injury ([facility] [address] )      Admitting Diagnosis: Left ureteral calculus [N20 1]  Age/Sex: 80 y o  male  Admission Orders:  Scheduled Medications:  aspirin, 81 mg, Oral, Daily  cefTRIAXone, 1,000 mg, Intravenous, Q24H  heparin (porcine), 5,000 Units, Subcutaneous, Q8H [address]  levothyroxine, 125 mcg, Oral, Early Morning  pantoprazole, 20 mg, Oral, Early Morning  pravastatin, 80 mg, Oral, Daily With Dinner  senna-docusate sodium, 1 tablet, Oral, QPM      Continuous IV Infusions:    lactated ringers infusion  Rate: 100 mL/hr Dose: 100 mL/hr  Freq: Continuous Route: IV  Indications of Use: IV Hydration  Last Dose: Stopped (10/19/22 0228)  Start: 10/18/22 1745 End: 10/19/22 0126    PRN Meds:  acetaminophen, 650 mg, Oral, Q6H PRN  OLANZapine, 2 5 mg, Intramuscular, Q3H PRN  ondansetron, 4 mg, Intravenous, Q6H PRN  oxyCODONE, 2 5 mg, Oral, Q4H PRN x1 thus far    scd  IO    IP CONSULT TO CASE MANAGEMENT    Network Utilization Review Department  ATTENTION: Please call with any questions or concerns to [phone] and carefully listen to the prompts so that you are directed to the right person   All voicemails are confidential   Jonah Cantor all requests for admission clinical reviews, approved or denied determinations and any other requests to dedicated fax number below belonging to the campus where the patient is receiving treatment   List of dedicated fax numbers for the Facilities:  [address] [place] [address] DENIALS (Administrative/Medical Necessity) [phone]   [address]  (Maternity/NICU/Pediatrics) [phone]   [address] [phone]   [address] [phone]   [address] [phone]   [facility] 134 815 [facility] [phone]

## 2022-10-19 NOTE — ASSESSMENT & PLAN NOTE
Patient with elevated creatinine on admission to 2 21 increased from baseline in the low 1 range  · Likely secondary to obstructive stone/UTI   · Resolved status post urologic intervention and antibiotic therapy   · Avoid nephrotoxins and hypotension  · Monitor BMP

## 2022-10-19 NOTE — PLAN OF CARE
Problem: PAIN - ADULT  Goal: Verbalizes/displays adequate comfort level or baseline comfort level  Description: Interventions:  - Encourage patient to monitor pain and request assistance  - Assess pain using appropriate pain scale  - Administer analgesics based on type and severity of pain and evaluate response  - Implement non-pharmacological measures as appropriate and evaluate response  - Consider cultural and social influences on pain and pain management  - Notify physician/advanced practitioner if interventions unsuccessful or patient reports new pain  Outcome: Progressing     Problem: INFECTION - ADULT  Goal: Absence or prevention of progression during hospitalization  Description: INTERVENTIONS:  - Assess and monitor for signs and symptoms of infection  - Monitor lab/diagnostic results  - Monitor all insertion sites, i e  indwelling lines, tubes, and drains  - Monitor endotracheal if appropriate and nasal secretions for changes in amount and color  - [place] appropriate cooling/warming therapies per order  - Administer medications as ordered  - Instruct and encourage patient and family to use good hand hygiene technique  - Identify and instruct in appropriate isolation precautions for identified infection/condition  Outcome: Progressing  Goal: Absence of fever/infection during neutropenic period  Description: INTERVENTIONS:  - Monitor WBC    Outcome: Progressing     Problem: SAFETY ADULT  Goal: Patient will remain free of falls  Description: INTERVENTIONS:  - Educate patient/family on patient safety including physical limitations  - Instruct patient to call for assistance with activity   - Consult OT/PT to assist with strengthening/mobility   - Keep Call bell within reach  - Keep bed low and locked with side rails adjusted as appropriate  - Keep care items and personal belongings within reach  - Initiate and maintain comfort rounds  - Make Fall Risk Sign visible to staff  - Offer Toileting every  Hours, in advance of need  - Initiate/Maintain     alarm  - Obtain necessary fall risk management equipment:     - Apply yellow socks and bracelet for high fall risk patients  - Consider moving patient to room near nurses station  Outcome: Progressing  Goal: Maintain or return to baseline ADL function  Description: INTERVENTIONS:  -  Assess patient's ability to carry out ADLs; assess patient's baseline for ADL function and identify physical deficits which impact ability to perform ADLs (bathing, care of mouth/teeth, toileting, grooming, dressing, etc )  - Assess/evaluate cause of self-care deficits   - Assess range of motion  - Assess patient's mobility; develop plan if impaired  - Assess patient's need for assistive devices and provide as appropriate  - Encourage maximum independence but intervene and supervise when necessary  - Involve family in performance of ADLs  - Assess for home care needs following discharge   - Consider OT consult to assist with ADL evaluation and planning for discharge  - Provide patient education as appropriate  Outcome: Progressing  Goal: Maintains/Returns to pre admission functional level  Description: INTERVENTIONS:  - Perform BMAT or MOVE assessment daily    - Set and communicate daily mobility goal to care team and patient/family/caregiver  - Collaborate with rehabilitation services on mobility goals if consulted  - Perform Range of Motion    times a day  - Reposition patient every    hours    - Dangle patient    times a day  - Stand patient    times a day  - Ambulate patient      times a day  - Out of bed to chair    times a day   - Out of bed for meals    times a day  - Out of bed for toileting  - Record patient progress and toleration of activity level   Outcome: Progressing     Problem: DISCHARGE PLANNING  Goal: Discharge to home or other facility with appropriate resources  Description: INTERVENTIONS:  - Identify barriers to discharge w/patient and caregiver  - Arrange for needed discharge resources and transportation as appropriate  - Identify discharge learning needs (meds, wound care, etc )  - Arrange for interpretive services to assist at discharge as needed  - Refer to Case Management Department for coordinating discharge planning if the patient needs post-hospital services based on physician/advanced practitioner order or complex needs related to functional status, cognitive ability, or social support system  Outcome: Progressing     Problem: Knowledge Deficit  Goal: Patient/family/caregiver demonstrates understanding of disease process, treatment plan, medications, and discharge instructions  Description: Complete learning assessment and assess knowledge base    Interventions:  - Provide teaching at level of understanding  - Provide teaching via preferred learning methods  Outcome: Progressing

## 2022-10-20 PROBLEM — R82.90 ABNORMAL URINALYSIS: Status: ACTIVE | Noted: 2022-10-18

## 2022-10-20 PROBLEM — E87.1 HYPONATREMIA: Status: ACTIVE | Noted: 2022-10-20

## 2022-10-20 LAB
ANION GAP SERPL CALCULATED.3IONS-SCNC: 3 MMOL/L (ref 4–13)
BUN SERPL-MCNC: 30 MG/DL (ref 5–25)
CALCIUM SERPL-MCNC: 8.6 MG/DL (ref 8.3–10.1)
CHLORIDE SERPL-SCNC: 105 MMOL/L (ref 96–108)
CO2 SERPL-SCNC: 27 MMOL/L (ref 21–32)
CREAT SERPL-MCNC: 0.85 MG/DL (ref 0.6–1.3)
ERYTHROCYTE [DISTWIDTH] IN BLOOD BY AUTOMATED COUNT: 14.5 % (ref 11.6–15.1)
GFR SERPL CREATININE-BSD FRML MDRD: 77 ML/MIN/1.73SQ M
GLUCOSE SERPL-MCNC: 112 MG/DL (ref 65–140)
HCT VFR BLD AUTO: 29.5 % (ref 36.5–49.3)
HGB BLD-MCNC: 9.8 G/DL (ref 12–17)
MCH RBC QN AUTO: 34.8 PG (ref 26.8–34.3)
MCHC RBC AUTO-ENTMCNC: 33.2 G/DL (ref 31.4–37.4)
MCV RBC AUTO: 105 FL (ref 82–98)
PLATELET # BLD AUTO: 387 THOUSANDS/UL (ref 149–390)
PMV BLD AUTO: 8.5 FL (ref 8.9–12.7)
POTASSIUM SERPL-SCNC: 4.1 MMOL/L (ref 3.5–5.3)
RBC # BLD AUTO: 2.82 MILLION/UL (ref 3.88–5.62)
SODIUM SERPL-SCNC: 135 MMOL/L (ref 135–147)
WBC # BLD AUTO: 8.56 THOUSAND/UL (ref 4.31–10.16)

## 2022-10-20 PROCEDURE — 85027 COMPLETE CBC AUTOMATED: CPT | Performed by: PHYSICIAN ASSISTANT

## 2022-10-20 PROCEDURE — 80048 BASIC METABOLIC PNL TOTAL CA: CPT | Performed by: PHYSICIAN ASSISTANT

## 2022-10-20 PROCEDURE — 99231 SBSQ HOSP IP/OBS SF/LOW 25: CPT | Performed by: PHYSICIAN ASSISTANT

## 2022-10-20 RX ADMIN — HEPARIN SODIUM 5000 UNITS: 5000 INJECTION INTRAVENOUS; SUBCUTANEOUS at 21:15

## 2022-10-20 RX ADMIN — PANTOPRAZOLE SODIUM 20 MG: 20 TABLET, DELAYED RELEASE ORAL at 06:25

## 2022-10-20 RX ADMIN — ASPIRIN 81 MG: 81 TABLET, COATED ORAL at 08:19

## 2022-10-20 RX ADMIN — LEVOTHYROXINE SODIUM 125 MCG: 125 TABLET ORAL at 06:25

## 2022-10-20 RX ADMIN — SENNOSIDES AND DOCUSATE SODIUM 1 TABLET: 8.6; 5 TABLET ORAL at 17:26

## 2022-10-20 RX ADMIN — HEPARIN SODIUM 5000 UNITS: 5000 INJECTION INTRAVENOUS; SUBCUTANEOUS at 06:25

## 2022-10-20 RX ADMIN — CEFTRIAXONE SODIUM 1000 MG: 10 INJECTION, POWDER, FOR SOLUTION INTRAVENOUS at 08:17

## 2022-10-20 RX ADMIN — HEPARIN SODIUM 5000 UNITS: 5000 INJECTION INTRAVENOUS; SUBCUTANEOUS at 14:59

## 2022-10-20 RX ADMIN — PRAVASTATIN SODIUM 80 MG: 40 TABLET ORAL at 17:26

## 2022-10-20 RX ADMIN — OXYCODONE HYDROCHLORIDE 2.5 MG: 5 TABLET ORAL at 21:23

## 2022-10-20 NOTE — ASSESSMENT & PLAN NOTE
Initially presented to [address] [place] [address] Road after fall at home  · CT AP on admission noting 1 cm obstructing left ureteral calculus in the pelvic portion of the left ureter with mild left hydronephrosis    Subsequently transferred to [facility] for urology evaluation  · Urology signed off,  · Status post cystoscopy with left ureteral stent placement on 10/18  · Maintain White catheter x 10-14 days with outpatient void trial   · Will require outpatient follow-up for staged stone procedure   · Analgesics and antiemetics as needed

## 2022-10-20 NOTE — ASSESSMENT & PLAN NOTE
Presenting with obstructive uropathy and altered mental status  · UA with small leukocytes and occasional bacteria  · S/p 3 days of IV ceftriaxone  · Urine culture noted to be negative for growth   · No additional abx needed at this time

## 2022-10-20 NOTE — PLAN OF CARE
Problem: PAIN - ADULT  Goal: Verbalizes/displays adequate comfort level or baseline comfort level  Description: Interventions:  - Encourage patient to monitor pain and request assistance  - Assess pain using appropriate pain scale  - Administer analgesics based on type and severity of pain and evaluate response  - Implement non-pharmacological measures as appropriate and evaluate response  - Consider cultural and social influences on pain and pain management  - Notify physician/advanced practitioner if interventions unsuccessful or patient reports new pain  Outcome: Progressing     Problem: INFECTION - ADULT  Goal: Absence or prevention of progression during hospitalization  Description: INTERVENTIONS:  - Assess and monitor for signs and symptoms of infection  - Monitor lab/diagnostic results  - Monitor all insertion sites, i e  indwelling lines, tubes, and drains  - Monitor endotracheal if appropriate and nasal secretions for changes in amount and color  - [place] appropriate cooling/warming therapies per order  - Administer medications as ordered  - Instruct and encourage patient and family to use good hand hygiene technique  - Identify and instruct in appropriate isolation precautions for identified infection/condition  Outcome: Progressing  Goal: Absence of fever/infection during neutropenic period  Description: INTERVENTIONS:  - Monitor WBC    Outcome: Progressing     Problem: SAFETY ADULT  Goal: Patient will remain free of falls  Description: INTERVENTIONS:  - Educate patient/family on patient safety including physical limitations  - Instruct patient to call for assistance with activity   - Consult OT/PT to assist with strengthening/mobility   - Keep Call bell within reach  - Keep bed low and locked with side rails adjusted as appropriate  - Keep care items and personal belongings within reach  - Initiate and maintain comfort rounds  - Make Fall Risk Sign visible to staff  - Offer Toileting every 2 Hours, in advance of need  - Initiate/Maintain bed alarm  - Apply yellow socks and bracelet for high fall risk patients  - Consider moving patient to room near nurses station  Outcome: Progressing  Goal: Maintain or return to baseline ADL function  Description: INTERVENTIONS:  -  Assess patient's ability to carry out ADLs; assess patient's baseline for ADL function and identify physical deficits which impact ability to perform ADLs (bathing, care of mouth/teeth, toileting, grooming, dressing, etc )  - Assess/evaluate cause of self-care deficits   - Assess range of motion  - Assess patient's mobility; develop plan if impaired  - Assess patient's need for assistive devices and provide as appropriate  - Encourage maximum independence but intervene and supervise when necessary  - Involve family in performance of ADLs  - Assess for home care needs following discharge   - Consider OT consult to assist with ADL evaluation and planning for discharge  - Provide patient education as appropriate  Outcome: Progressing  Goal: Maintains/Returns to pre admission functional level  Description: INTERVENTIONS:  - Perform BMAT or MOVE assessment daily    - Set and communicate daily mobility goal to care team and patient/family/caregiver  - Collaborate with rehabilitation services on mobility goals if consulted  - Perform Range of Motion 4 times a day  - Reposition patient every 2 hours    - Dangle patient 4 times a day  - Stand patient 4 times a day  - Ambulate patient 4 times a day  - Out of bed to chair 4 times a day   - Out of bed for meals 4 times a day  - Out of bed for toileting  - Record patient progress and toleration of activity level   Outcome: Progressing

## 2022-10-20 NOTE — ASSESSMENT & PLAN NOTE
· Likely hypo-osmolar hyponatremia a/e/b sodium level of 132 treated with IVF hydration and lab monitoring  · Resolved, fluids are now discontinued

## 2022-10-20 NOTE — ASSESSMENT & PLAN NOTE
· BP acceptable, monitor routinely  · Home losartan 50 mg daily held in setting of KARIE, now resolved, can resume on discharge

## 2022-10-20 NOTE — ASSESSMENT & PLAN NOTE
Chronic L3 and L4 compression fractures, stable on CT lumbar spine from 10/11/22 at [facility]  · Continue with TLSO brace   · PT/OT, recommending STR

## 2022-10-20 NOTE — ASSESSMENT & PLAN NOTE
Straight cathed for 1500 cc on admission to [facility]  · Status post urologic intervention as above  · White catheter to remain in place on discharge with outpatient void trial as per Urology

## 2022-10-20 NOTE — CASE MANAGEMENT
Case Management Discharge Planning Note    Patient name Samuel Jama  [facility] MRN [id number]  : 1934 Date 10/20/2022       Current Admission Date: 10/18/2022  Current Admission Diagnosis:Hydronephrosis with urinary obstruction due to renal calculus   Patient Active Problem List    Diagnosis Date Noted   • Status post fall 10/19/2022   • Urinary retention 10/19/2022   • Acute metabolic encephalopathy    • History of fracture of right hip 10/19/2022   • Lumbar compression fracture ([address] )    • Complicated UTI (urinary tract infection) 10/18/2022   • Disorientation 10/18/2022   • Hydronephrosis with urinary obstruction due to renal calculus 10/18/2022   • Essential (primary) hypertension 10/08/2022   • Gastro-esophageal reflux disease without esophagitis 10/08/2022   • Hyperlipidemia 10/08/2022   • Polyneuropathy, peripheral sensorimotor axonal    • Hypothyroidism 2007      LOS (days): 2  Geometric Mean LOS (GMLOS) (days): 1 80  Days to GMLOS:0 1     OBJECTIVE:  Risk of Unplanned Readmission Score: 14 18         Current admission status: Inpatient   Preferred Pharmacy: No Pharmacies Listed  Primary Care Provider: Roxanne Alcantara MD    Primary Insurance: [address] [place] [address]  Secondary Insurance:     DISCHARGE DETAILS:    Discharge planning discussed with[de-identified] Nicci Alves (daughter)  Freedom of Choice: Yes  Comments - Freedom of Choice: Discussed current accepting rehabs and asked questions that [address] [place] [address],[address] had  Messaged  with answers  Daughter discussing with family and will let CM know    CM contacted family/caregiver?: Yes  Were Treatment Team discharge recommendations reviewed with patient/caregiver?: Yes  Did patient/caregiver verbalize understanding of patient care needs?: N/A- going to facility  Were patient/caregiver advised of the risks associated with not following Treatment Team discharge recommendations?: Yes    Contacts  Patient Contacts: Dali  Relationship to Patient[de-identified] Family  Contact Method: Phone  Reason/Outcome: Continuity of Care, Emergency Contact, Discharge [address]         Is the patient interested in [facility] at discharge?: No    DME Referral Provided  Referral made for DME?: No    Other Referral/Resources/Interventions Provided:  Interventions: Short Term Rehab         Treatment Team Recommendation: Short Term Rehab  Discharge Destination Plan[de-identified] Short Term Rehab  Transport at Discharge : BLS Ambulance                             IMM Given (Date):: 10/20/22  IMM Given to[de-identified] Family  Family notified[de-identified] Jennifer Serrano Daughter

## 2022-10-20 NOTE — ASSESSMENT & PLAN NOTE
Acute impacted comminuted subcapital femur fracture of the right hip on 10/8/22  Status post operative fixation at [facility]     · Analgesics as needed   · PT/OT recommending STR, CM following for placement

## 2022-10-20 NOTE — PROGRESS NOTES
[facility]  Progress Note - [facility] 1934, 80 y o  male MRN: [id number]  Unit/Bed#: [facility] 316-01 Encounter: [id number]  Primary Care Provider: Laure Lee MD   Date and time admitted to hospital: 10/18/2022  4:21 PM      DOS: 10/20/2022  * Hydronephrosis with urinary obstruction due to renal calculus  Assessment & Plan  Initially presented to [address] [place] [address] after fall at home  · CT AP on admission noting 1 cm obstructing left ureteral calculus in the pelvic portion of the left ureter with mild left hydronephrosis  Subsequently transferred to [facility] for urology evaluation  · Urology signed off,  · Status post cystoscopy with left ureteral stent placement on 10/18  · Maintain Wht[place] catheter x 10-14 days with outpatient void trial   · Will require outpatient follow-up for staged stone procedure   · Analgesics and antiemetics as needed    Hyponatremia  Assessment & Plan  · Likely hypo-osmolar hyponatremia a/e/b sodium level of 132 treated with IVF hydration and lab monitoring  · Resolved, fluids are now discontinued     Lumbar compression fracture [facility])  Assessment & Plan  Chronic L3 and L4 compression fractures, stable on CT lumbar spine from 10/11/22 at [facility]  · Continue with TLSO brace   · PT/OT, recommending STR    History of fracture of right hip  Assessment & Plan  Acute impacted comminuted subcapital femur fracture of the right hip on 10/8/22  Status post operative fixation at [facility]     · Analgesics as needed   · PT/OT recommending STR, CM following for placement     Acute metabolic encephalopathy  Assessment & Plan  Daughter reporting disorientation over the last several weeks; likely multifactorial in the setting of recent hip surgery, obstructive uropathy, KARIE  · Provide frequent reorientation and supportive cares  · Overall improved per discussion with daughter today     Urinary retention  Assessment & Plan  Straight cathed for 1500 cc on [facility]  · Status post urologic intervention as above  · White catheter to remain in place on discharge with outpatient void trial as per Urology     Status post fall  Assessment & Plan  Presented after fall at [facility] as above  · Note, recent right hip fracture status post operative repair earlier this month at [facility]  · Xray right femur here negative for osseous abnormality   · PT/OT evaluations recommending STR, CM following for placement, family requesting that patient does not go back to [facility] on discharge    Hypothyroidism  Assessment & Plan  · Continue levothyroxine 125 mcg daily    Hyperlipidemia  Assessment & Plan  · Continue statin    Gastro-esophageal reflux disease without esophagitis  Assessment & Plan  · Continue protonix 20 mg daily     Essential (primary) hypertension  Assessment & Plan  · BP acceptable, monitor routinely  · Home losartan 50 mg daily held in setting of KARIE, now resolved, can resume on discharge    Abnormal urinalysis  Assessment & Plan  Presenting with obstructive uropathy and altered mental status  · UA with small leukocytes and occasional bacteria  · S/p 3 days of IV ceftriaxone  · Urine culture noted to be negative for growth   · No additional abx needed at this time    VTE Pharmacologic Prophylaxis: VTE Score: 5 Moderate Risk (Score 3-4) - Pharmacological DVT Prophylaxis Ordered: heparin  Patient Centered Rounds: I evaluated the patient without nursing staff present due to speaking to nurse outside patient's room   Discussions with Specialists or Other Care Team Provider: Discussed with RN, MIRIAM and reviewed previous notes     Education and Discussions with Family / Patient: Updated  (daughter) via phone  Dali     Time Spent for Care: 20 minutes  More than 50% of total time spent on counseling and coordination of care as described above  Current Length of Stay: 2 day(s)  Current Patient Status: Inpatient   Certification Statement:  The patient will continue to require additional inpatient hospital stay due to awaiting STR placement   Discharge Plan: Anticipate discharge tomorrow to rehab facility  Code Status: Level 1 - Full Code    Subjective:   Pt reports that he is doing okay today, states that he had a bowel movement today but still feels as though he has more that can come out  Denies any issues with his kelly catheter  Denies any chest pain, shortness of breath, abdominal pain, nausea or vomiting  Pt's daughter reports that his mentation appears to be improved since last night when she spoke with him  Objective:     Vitals:   Temp (24hrs), Av 9 °F (36 6 °C), Min:97 5 °F (36 4 °C), Max:98 3 °F (36 8 °C)    Temp:  [97 5 °F (36 4 °C)-98 3 °F (36 8 °C)] 97 5 °F (36 4 °C)  HR:  [73-89] 73  Resp:  [16-18] 16  BP: (100-119)/(49-56) 119/56  SpO2:  [98 %-100 %] 99 %  There is no height or weight on file to calculate BMI  Input and Output Summary (last 24 hours): Intake/Output Summary (Last 24 hours) at 10/20/2022 1303  Last data filed at 10/20/2022 1039  Gross per 24 hour   Intake 120 ml   Output 1475 ml   Net -1355 ml       Physical Exam:   Physical Exam  Vitals reviewed  Constitutional:       General: He is not in acute distress  Appearance: He is not toxic-appearing  Comments: Pt is in no acute distress lying in his hospital bed resting comfortably  Kelly catheter in place draining yellow urine   HENT:      Head: Normocephalic and atraumatic  Cardiovascular:      Rate and Rhythm: Normal rate and regular rhythm  Pulses: Normal pulses  Pulmonary:      Effort: Pulmonary effort is normal  No respiratory distress  Abdominal:      General: Bowel sounds are normal  There is no distension  Palpations: Abdomen is soft  Tenderness: There is no abdominal tenderness  Musculoskeletal:      Right lower leg: No edema  Left lower leg: No edema  Skin:     General: Skin is warm and dry        Findings: No erythema  Neurological:      Mental Status: He is alert  Psychiatric:         Mood and Affect: Mood normal           Additional Data:     Labs:  Results from last 7 days   Lab Units 10/20/22  0521 10/19/22  0552   WBC Thousand/uL 8 56 8 86   HEMOGLOBIN g/dL 9 8* 9 7*   HEMATOCRIT % 29 5* 29 6*   PLATELETS Thousands/uL 387 382   NEUTROS PCT %  --  76*   LYMPHS PCT %  --  14   MONOS PCT %  --  9   EOS PCT %  --  0     Results from last 7 days   Lab Units 10/20/22  0521 10/19/22  0552 10/18/22  1010   SODIUM mmol/L 135   < > 132*   POTASSIUM mmol/L 4 1   < > 4 6   CHLORIDE mmol/L 105   < > 100   CO2 mmol/L 27   < > 24   BUN mg/dL 30*   < > 50*   CREATININE mg/dL 0 85   < > 2 21*   ANION GAP mmol/L 3*   < > 8   CALCIUM mg/dL 8 6   < > 9 5   ALBUMIN g/dL  --   --  2 6*   TOTAL BILIRUBIN mg/dL  --   --  1 50*   ALK PHOS U/L  --   --  84   ALT U/L  --   --  44   AST U/L  --   --  44   GLUCOSE RANDOM mg/dL 112   < > 125    < > = values in this interval not displayed       Results from last 7 days   Lab Units 10/18/22  1059   INR  1 14                   Lines/Drains:  Invasive Devices  Report    Peripheral Intravenous Line  Duration           Peripheral IV 10/18/22 Right Antecubital 2 days          Drain  Duration           Urethral Catheter Latex 18 Fr  1 day              Urinary Catheter:  Goal for removal: N/A- Discharging with White               Imaging: Reviewed radiology reports from this admission including: chest CT scan, abdominal/pelvic CT and xray(s)    Recent Cultures (last 7 days):   Results from last 7 days   Lab Units 10/18/22  1107   URINE CULTURE  No Growth <1000 cfu/mL       Last 24 Hours Medication List:   Current Facility-Administered Medications   Medication Dose Route Frequency Provider Last Rate   • acetaminophen  650 mg Oral Q6H PRN Vernon Goltz, MD     • aspirin  81 mg Oral Daily Favian Montague DO     • heparin (porcine)  5,000 Units Subcutaneous Q8H [facility] Favian Montague DO     • levothyroxine 125 mcg Oral Early Morning Favian Banai, DO     • OLANZapine  2 5 mg Intramuscular Q3H PRN Favianfidel Siddiquiai, DO     • ondansetron  4 mg Intravenous Q6H PRN Meliton Walton MD     • oxyCODONE  2 5 mg Oral Q4H PRN Teresa Ivey PA-C     • pantoprazole  20 mg Oral Early Morning Favian Banai, DO     • pravastatin  80 mg Oral Daily With [employer], DO     • senna-docusate sodium  1 tablet Oral QPM Favianfidel Montague, DO          Today, Patient Was Seen By: Davdi Ramirez    **Please Note: This note may have been constructed using a voice recognition system  **

## 2022-10-20 NOTE — ASSESSMENT & PLAN NOTE
Daughter reporting disorientation over the last several weeks; likely multifactorial in the setting of recent hip surgery, obstructive uropathy, KARIE  · Provide frequent reorientation and supportive cares  · Overall improved per discussion with daughter today

## 2022-10-20 NOTE — ASSESSMENT & PLAN NOTE
Presented after fall at [facility] as above  · Note, recent right hip fracture status post operative repair earlier this month at [facility]  · Xray right femur here negative for osseous abnormality   · PT/OT evaluations recommending STR, CM following for placement, family requesting that patient does not go back to [facility] on discharge

## 2022-10-20 NOTE — CASE MANAGEMENT
Case Management Assessment & Discharge Planning Note    Patient name Ermelinda Finder  [facility] MRN [id number]  : 1934 Date 10/20/2022       Current Admission Date: 10/18/2022  Current Admission Diagnosis:Hydronephrosis with urinary obstruction due to renal calculus   Patient Active Problem List    Diagnosis Date Noted   • Hyponatremia 10/20/2022   • Status post fall 10/19/2022   • Urinary retention 10/19/2022   • Acute metabolic encephalopathy    • History of fracture of right hip 10/19/2022   • Lumbar compression fracture ([address] ) 10/19/2022   • Abnormal urinalysis 10/18/2022   • Disorientation 10/18/2022   • Hydronephrosis with urinary obstruction due to renal calculus 10/18/2022   • Essential (primary) hypertension 10/08/2022   • Gastro-esophageal reflux disease without esophagitis 10/08/2022   • Hyperlipidemia 10/08/2022   • Polyneuropathy, peripheral sensorimotor axonal    • Hypothyroidism 2007      LOS (days): 2  Geometric Mean LOS (GMLOS) (days): 5 90  Days to GMLOS:4     OBJECTIVE:    Risk of Unplanned Readmission Score: 14 06         Current admission status: Inpatient       Preferred Pharmacy: No Pharmacies Listed  Primary Care Provider: Dodie Carrion MD    Primary Insurance: [facility] REP  Secondary Insurance:     ASSESSMENT:  [address], [address] [facility] - Daughter   Primary Phone: [phone] (Mobile)                                                                DISCHARGE DETAILS:    Discharge planning discussed with[de-identified] Bubba Ras of Choice: Yes  Comments - Freedom of Choice: family requested [address],   contacted family/caregiver?: Yes  Were Treatment Team discharge recommendations reviewed with patient/caregiver?: Yes  Did patient/caregiver verbalize understanding of patient care needs?: N/A- going to facility  Were patient/caregiver advised of the risks associated with not following Treatment Team discharge recommendations?: Yes

## 2022-10-21 VITALS
SYSTOLIC BLOOD PRESSURE: 109 MMHG | HEART RATE: 67 BPM | DIASTOLIC BLOOD PRESSURE: 56 MMHG | OXYGEN SATURATION: 100 % | TEMPERATURE: 98 F | RESPIRATION RATE: 16 BRPM

## 2022-10-21 DIAGNOSIS — S32.000D COMPRESSION FRACTURE OF LUMBAR VERTEBRA WITH ROUTINE HEALING, UNSPECIFIED LUMBAR VERTEBRAL LEVEL, SUBSEQUENT ENCOUNTER: ICD-10-CM

## 2022-10-21 PROBLEM — E87.1 HYPONATREMIA: Status: RESOLVED | Noted: 2022-10-20 | Resolved: 2022-10-21

## 2022-10-21 PROBLEM — G93.41 ACUTE METABOLIC ENCEPHALOPATHY: Status: RESOLVED | Noted: 2022-10-19 | Resolved: 2022-10-21

## 2022-10-21 LAB — SARS-COV-2 RNA RESP QL NAA+PROBE: NEGATIVE

## 2022-10-21 PROCEDURE — 99239 HOSP IP/OBS DSCHRG MGMT >30: CPT | Performed by: PHYSICIAN ASSISTANT

## 2022-10-21 PROCEDURE — XW023V7 INTRODUCTION OF COVID-19 VACCINE DOSE 3 INTO MUSCLE, PERCUTANEOUS APPROACH, NEW TECHNOLOGY GROUP 7: ICD-10-PCS | Performed by: PHYSICIAN ASSISTANT

## 2022-10-21 PROCEDURE — 0001A HB IMMUNIZATION ADMIN COVID-19 30MCG/0.3ML FIRST DOSE: CPT | Performed by: PHYSICIAN ASSISTANT

## 2022-10-21 PROCEDURE — U0005 INFEC AGEN DETEC AMPLI PROBE: HCPCS | Performed by: PHYSICIAN ASSISTANT

## 2022-10-21 PROCEDURE — 91300 COVID-19 PFIZER VAC BIVALENT TRIS-SUCROSE 30 MCG/0.3 ML SUSP: CPT | Performed by: PHYSICIAN ASSISTANT

## 2022-10-21 PROCEDURE — U0003 INFECTIOUS AGENT DETECTION BY NUCLEIC ACID (DNA OR RNA); SEVERE ACUTE RESPIRATORY SYNDROME CORONAVIRUS 2 (SARS-COV-2) (CORONAVIRUS DISEASE [COVID-19]), AMPLIFIED PROBE TECHNIQUE, MAKING USE OF HIGH THROUGHPUT TECHNOLOGIES AS DESCRIBED BY CMS-2020-01-R: HCPCS | Performed by: PHYSICIAN ASSISTANT

## 2022-10-21 RX ORDER — OXYCODONE HYDROCHLORIDE 5 MG/1
2.5 TABLET ORAL EVERY 4 HOURS PRN
Qty: 21 TABLET | Refills: 0 | Status: SHIPPED | OUTPATIENT
Start: 2022-10-21 | End: 2022-10-28

## 2022-10-21 RX ORDER — OXYCODONE HYDROCHLORIDE 5 MG/1
2.5 TABLET ORAL EVERY 4 HOURS PRN
Qty: 6 TABLET | Refills: 0 | Status: ON HOLD | OUTPATIENT
Start: 2022-10-21 | End: 2022-10-21 | Stop reason: SDUPTHER

## 2022-10-21 RX ORDER — ACETAMINOPHEN 325 MG/1
650 TABLET ORAL EVERY 6 HOURS PRN
Refills: 0
Start: 2022-10-21

## 2022-10-21 RX ADMIN — ASPIRIN 81 MG: 81 TABLET, COATED ORAL at 09:06

## 2022-10-21 RX ADMIN — LEVOTHYROXINE SODIUM 125 MCG: 125 TABLET ORAL at 05:42

## 2022-10-21 RX ADMIN — HEPARIN SODIUM 5000 UNITS: 5000 INJECTION INTRAVENOUS; SUBCUTANEOUS at 05:43

## 2022-10-21 RX ADMIN — OXYCODONE HYDROCHLORIDE 2.5 MG: 5 TABLET ORAL at 11:00

## 2022-10-21 RX ADMIN — BNT162B2 ORIGINAL AND OMICRON BA.4/BA.5 0.3 ML: .1125; .1125 INJECTION, SUSPENSION INTRAMUSCULAR at 16:00

## 2022-10-21 RX ADMIN — HEPARIN SODIUM 5000 UNITS: 5000 INJECTION INTRAVENOUS; SUBCUTANEOUS at 14:03

## 2022-10-21 RX ADMIN — OXYCODONE HYDROCHLORIDE 2.5 MG: 5 TABLET ORAL at 05:42

## 2022-10-21 RX ADMIN — ACETAMINOPHEN 650 MG: 325 TABLET, FILM COATED ORAL at 00:48

## 2022-10-21 RX ADMIN — PANTOPRAZOLE SODIUM 20 MG: 20 TABLET, DELAYED RELEASE ORAL at 05:42

## 2022-10-21 NOTE — PLAN OF CARE
Problem: PAIN - ADULT  Goal: Verbalizes/displays adequate comfort level or baseline comfort level  Description: Interventions:  - Encourage patient to monitor pain and request assistance  - Assess pain using appropriate pain scale  - Administer analgesics based on type and severity of pain and evaluate response  - Implement non-pharmacological measures as appropriate and evaluate response  - Consider cultural and social influences on pain and pain management  - Notify physician/advanced practitioner if interventions unsuccessful or patient reports new pain  Outcome: Progressing     Problem: INFECTION - ADULT  Goal: Absence or prevention of progression during hospitalization  Description: INTERVENTIONS:  - Assess and monitor for signs and symptoms of infection  - Monitor lab/diagnostic results  - Monitor all insertion sites, i e  indwelling lines, tubes, and drains  - Monitor endotracheal if appropriate and nasal secretions for changes in amount and color  - [place] appropriate cooling/warming therapies per order  - Administer medications as ordered  - Instruct and encourage patient and family to use good hand hygiene technique  - Identify and instruct in appropriate isolation precautions for identified infection/condition  Outcome: Progressing  Goal: Absence of fever/infection during neutropenic period  Description: INTERVENTIONS:  - Monitor WBC    Outcome: Progressing     Problem: SAFETY ADULT  Goal: Patient will remain free of falls  Description: INTERVENTIONS:  - Educate patient/family on patient safety including physical limitations  - Instruct patient to call for assistance with activity   - Consult OT/PT to assist with strengthening/mobility   - Keep Call bell within reach  - Keep bed low and locked with side rails adjusted as appropriate  - Keep care items and personal belongings within reach  - Initiate and maintain comfort rounds  - Make Fall Risk Sign visible to staff  - Offer Toileting every Hours, in advance of need  - Initiate/Maintain   alarm  - Obtain necessary fall risk management equipment:     - Apply yellow socks and bracelet for high fall risk patients  - Consider moving patient to room near nurses station  Outcome: Progressing  Goal: Maintain or return to baseline ADL function  Description: INTERVENTIONS:  -  Assess patient's ability to carry out ADLs; assess patient's baseline for ADL function and identify physical deficits which impact ability to perform ADLs (bathing, care of mouth/teeth, toileting, grooming, dressing, etc )  - Assess/evaluate cause of self-care deficits   - Assess range of motion  - Assess patient's mobility; develop plan if impaired  - Assess patient's need for assistive devices and provide as appropriate  - Encourage maximum independence but intervene and supervise when necessary  - Involve family in performance of ADLs  - Assess for home care needs following discharge   - Consider OT consult to assist with ADL evaluation and planning for discharge  - Provide patient education as appropriate  Outcome: Progressing  Goal: Maintains/Returns to pre admission functional level  Description: INTERVENTIONS:  - Perform BMAT or MOVE assessment daily    - Set and communicate daily mobility goal to care team and patient/family/caregiver  - Collaborate with rehabilitation services on mobility goals if consulted  - Perform Range of Motion    times a day  - Reposition patient every    hours    - Dangle patient      times a day  - Stand patient    times a day  - Ambulate patient      times a day  - Out of bed to chair    times a day   - Out of bed for meals    times a day  - Out of bed for toileting  - Record patient progress and toleration of activity level   Outcome: Progressing     Problem: DISCHARGE PLANNING  Goal: Discharge to home or other facility with appropriate resources  Description: INTERVENTIONS:  - Identify barriers to discharge w/patient and caregiver  - Arrange for needed discharge resources and transportation as appropriate  - Identify discharge learning needs (meds, wound care, etc )  - Arrange for interpretive services to assist at discharge as needed  - Refer to Case Management Department for coordinating discharge planning if the patient needs post-hospital services based on physician/advanced practitioner order or complex needs related to functional status, cognitive ability, or social support system  Outcome: Progressing     Problem: Knowledge Deficit  Goal: Patient/family/caregiver demonstrates understanding of disease process, treatment plan, medications, and discharge instructions  Description: Complete learning assessment and assess knowledge base  Interventions:  - Provide teaching at level of understanding  - Provide teaching via preferred learning methods  Outcome: Progressing     Problem: MOBILITY - ADULT  Goal: Maintain or return to baseline ADL function  Description: INTERVENTIONS:  -  Assess patient's ability to carry out ADLs; assess patient's baseline for ADL function and identify physical deficits which impact ability to perform ADLs (bathing, care of mouth/teeth, toileting, grooming, dressing, etc )  - Assess/evaluate cause of self-care deficits   - Assess range of motion  - Assess patient's mobility; develop plan if impaired  - Assess patient's need for assistive devices and provide as appropriate  - Encourage maximum independence but intervene and supervise when necessary  - Involve family in performance of ADLs  - Assess for home care needs following discharge   - Consider OT consult to assist with ADL evaluation and planning for discharge  - Provide patient education as appropriate  Outcome: Progressing  Goal: Maintains/Returns to pre admission functional level  Description: INTERVENTIONS:  - Perform BMAT or MOVE assessment daily    - Set and communicate daily mobility goal to care team and patient/family/caregiver     - Collaborate with rehabilitation services on mobility goals if consulted  - Perform Range of Motion    times a day  - Reposition patient every      hours    - Dangle patient  times a day  - Stand patient      times a day  - Ambulate patient    times a day  - Out of bed to chair    times a day   - Out of bed for meals      times a day  - Out of bed for toileting  - Record patient progress and toleration of activity level   Outcome: Progressing     Problem: Prexisting or High Potential for Compromised Skin Integrity  Goal: Skin integrity is maintained or improved  Description: INTERVENTIONS:  - Identify patients at risk for skin breakdown  - Assess and monitor skin integrity  - Assess and monitor nutrition and hydration status  - Monitor labs   - Assess for incontinence   - Turn and reposition patient  - Assist with mobility/ambulation  - Relieve pressure over bony prominences  - Avoid friction and shearing  - Provide appropriate hygiene as needed including keeping skin clean and dry  - Evaluate need for skin moisturizer/barrier cream  - Collaborate with interdisciplinary team   - Patient/family teaching  - Consider wound care consult   Outcome: Progressing     Problem: Potential for Falls  Goal: Patient will remain free of falls  Description: INTERVENTIONS:  - Educate patient/family on patient safety including physical limitations  - Instruct patient to call for assistance with activity   - Consult OT/PT to assist with strengthening/mobility   - Keep Call bell within reach  - Keep bed low and locked with side rails adjusted as appropriate  - Keep care items and personal belongings within reach  - Initiate and maintain comfort rounds  - Make Fall Risk Sign visible to staff  - Offer Toileting every    Hours, in advance of need  - Initiate/Maintain   alarm  - Obtain necessary fall risk management equipment:       - Apply yellow socks and bracelet for high fall risk patients  - Consider moving patient to room near nurses station  Outcome: Progressing

## 2022-10-21 NOTE — CASE MANAGEMENT
Chris Jennings has received approved authorization from:   [place] in by Marilia ROSALES#  [phone]  Authorization received for: [facility]  Facility: [facility] #: [id number]  Start of Care: 10/20  Next Review Date: 10/24  Care Coordinator: Tom ROSALES#: [phone]  Submit next review to: [phone]   Care Manager notified:  Yes

## 2022-10-21 NOTE — DISCHARGE SUMMARY
[address] [facility]  Discharge- Brissan Row 1934, 80 y o  male MRN: [id number]  Unit/Bed#: [facility] 316-01 Encounter: [id number]  Primary Care Provider: Tam Mora MD   Date and time admitted to hospital: 10/18/2022  4:21 PM      DOS: 10/21/2022  * Hydronephrosis with urinary obstruction due to renal calculus  Assessment & Plan  Initially presented to [address] [place] [address] after fall at [facility]   · CT AP on admission noting 1 cm obstructing left ureteral calculus in the pelvic portion of the left ureter with mild left hydronephrosis  Subsequently transferred to [facility] for urology evaluation  · Urology signed off,  · Status post cystoscopy with left ureteral stent placement on 10/18  · Maintain White catheter x 10-14 days with outpatient void trial   · Will require outpatient follow-up for staged stone procedure   · Analgesics and antiemetics as needed    Lumbar compression fracture [facility])  Assessment & Plan  Chronic L3 and L4 compression fractures, stable on CT lumbar spine from 10/11/22 at [facility]  · Continue with TLSO brace   · PT/OT, recommending STR  · Continue PRN tylenol and low dose PRN oxycodone 2 5 mg Q4H for pain management  · Consideration for follow up with neurosurgery in the outpatient setting     History of fracture of right hip  Assessment & Plan  Acute impacted comminuted subcapital femur fracture of the right hip on 10/8/22  Status post operative fixation at [facility]     · Analgesics as needed   · PT/OT recommending STR, CM following for placement     Urinary retention  Assessment & Plan  Straight cathed for 1500 cc on admission to [facility]  · Status post urologic intervention as above  · White catheter to remain in place on discharge with outpatient void trial as per Urology     Status post fall  Assessment & Plan  Presented after fall at [facility] as above  · Note, recent right hip fracture status post operative repair earlier this month at [facility]  · Xray right femur here negative for osseous abnormality   · PT/OT evaluations recommending STR, CM following for placement, family requesting that patient does not go back to [facility] on discharge, plan to discharge to [address] [place] [address]    Hypothyroidism  Assessment & Plan  · Continue levothyroxine 125 mcg daily    Hyperlipidemia  Assessment & Plan  · Continue simvastatin 40 mg daily     Gastro-esophageal reflux disease without esophagitis  Assessment & Plan  · Continue protonix 20 mg daily     Essential (primary) hypertension  Assessment & Plan  · BP acceptable  · Home losartan 50 mg daily held in setting of KARIE, now resolved, can resume on discharge    Abnormal urinalysis  Assessment & Plan  Presenting with obstructive uropathy and altered mental status  · UA with small leukocytes and occasional bacteria  · S/p 3 days of IV ceftriaxone  · Urine culture noted to be negative for growth   · No additional abx needed at this time    Hyponatremia-resolved as of 10/21/2022  Assessment & Plan  · Likely hypo-osmolar hyponatremia a/e/b sodium level of 132 treated with IVF hydration and lab monitoring  · Resolved, fluids are now discontinued     Acute metabolic encephalopathy-resolved as of 10/21/2022  Assessment & Plan  Daughter reporting disorientation over the last several weeks; likely multifactorial in the setting of recent hip surgery, obstructive uropathy, KARIE  · Provide frequent reorientation and supportive cares  · Overall mentation is improved and at baseline upon discussion with daughter today     Medical Problems             Resolved Problems  Date Reviewed: 10/21/2022          Resolved    Acute kidney injury ([facility] [address] ) 10/19/2022     Resolved by  Jay Jay Ivey PA-C    Acute metabolic encephalopathy 55/02/3808     Resolved by  Day Donohue PA-C    Hyponatremia 10/21/2022     Resolved by  Day Donohue PA-C              Discharging Physician / Practitioner: Toro Mendez  PCP: Wade Salcedo MD  Admission Date:   Admission Orders (From admission, onward)     Ordered        10/18/22 2105  Inpatient Admission  Once                      Discharge Date: 10/21/22    Consultations During Hospital Stay:  · Urology     Procedures Performed:   · Xray pelvis 10/18 - No acute osseous abnormality  Right hip arthroplasty  · CT head 10/18 - No acute intracranial hemorrhage seen, no mass effect or midline shift or extra axial collection   · CT c/a/p 10/18 - No solid visceral injury seen  1 cm obstructing left ureteral calculus in pelvic portion of the left ureter with mild left hydronephrosis  Non obstructing 4 mm calculus lower pole left kidney  Distended urinary bladder  Superior endplate depression of L4 vertebra and biconcavity of the L3 vertebra with 60% loss of vertebral height, age indeterminate, probably chronic related to osteoporotic compression  Mild bronchitic changes right lung base  · CT recon lumbar spine 10/18 - Superior endplate depression of the L4 vertebra with mild central depression, age indeterminate fracture  60% loss of vertebral height of L3 vertebra with endplate depression, age indeterminate  Mild central canal narrowing at L4/5 with facet joint disease and ligamentous hypertrophy  Left foraminal protrusion of L4/5 with moderate left foraminal narrowing  · Xray femur right 10/18 - No acute osseous abnormality  · Left retrograde pyelogram 10/18    Significant Findings / Test Results:   · KARIE resolved   · Troponins negative   · COVID negative  · Urine culture negative for growth     Incidental Findings:   · Lumbar compression fractures, noted to be on prior CT imaging      Test Results Pending at Discharge (will require follow up):    · None     Outpatient Tests Requested:  · Per PCP    Complications:  None    Reason for Admission: Urinary retention, fall     Hospital Course:   Vianca Fu is a 80 y o  male patient with significant past medical history of hypothyroidism, hyperlipidemia, GERD, HTN who originally presented to the hospital on 10/18/2022 due to fall at [facility] with acute urinary retention  Was noted to have a distal stone on the left with hydronephrosis and was transferred to [facility] for urology evaluation and intervention  He was noted to have a recent hip fracture that was repaired at [facility] earlier in the month with associated compression fractures of the vertebra which were again noted on imaging in the ER  He was continued with supportive care and PRN low dose oxycodone with improvement of his pain  He was seen by urology and underwent cystoscopy with ureteral stent insertion on the left and kelly catheter was placed  His KARIE resolved  He was treated with 3 days of IV abx and urine culture was negative, therefore he was monitored off of further abx with stabilization of symptoms  He was advised to follow up with urology in the outpatient setting and should have kelly catheter in place for 10-14 days prior to outpatient void trial  Pt's mentation improved throughout hospitalization and he was discharged to [facility]  He was discharged in stable condition  For additional information please refer to medical records  Medication changes include: Addition of PRN tylenol and oxycodone 2 5 mg Q4H for moderate pain       Please see above list of diagnoses and related plan for additional information  Condition at Discharge: stable    Discharge Day Visit / Exam:   Subjective:  Pt reports that he is doing okay today, continues to have chronic back pain, more so on the left, lower side of the back  Reports that this pain has been ongoing as an outpatient  Denies any issues with kelly catheter, last bowel movement was yesterday  Appetite is good, denies any abdominal pain, nausea or vomiting     Vitals: Blood Pressure: 141/65 (10/21/22 0730)  Pulse: 67 (10/21/22 0730)  Temperature: 98 °F (36 7 °C) (10/21/22 0730)  Temp Source: Oral (10/21/22 0730)  Respirations: 16 (10/21/22 0730)  SpO2: 100 % (10/21/22 4418)  Exam:   Physical Exam  Vitals reviewed  Constitutional:       General: He is not in acute distress  Appearance: He is not toxic-appearing  Comments: Pt is in no acute distress lying in his hospital bed resting comfortably  White catheter in place draining clear yellow urine    HENT:      Head: Normocephalic and atraumatic  Cardiovascular:      Rate and Rhythm: Normal rate and regular rhythm  Pulses: Normal pulses  Pulmonary:      Effort: Pulmonary effort is normal  No respiratory distress  Breath sounds: No wheezing  Abdominal:      General: Bowel sounds are normal  There is no distension  Palpations: Abdomen is soft  Musculoskeletal:      Right lower leg: Edema present  Left lower leg: Edema present  Comments: Trace lower extremity edema bilaterally    Skin:     General: Skin is warm and dry  Findings: No erythema  Neurological:      Mental Status: He is alert  Discussion with Family: Updated  (daughter) at bedside  Discharge instructions/Information to patient and family:   See after visit summary for information provided to patient and family  Provisions for Follow-Up Care:  See after visit summary for information related to follow-up care and any pertinent home health orders  Disposition:   Other: [facility]    Planned Readmission: None     Discharge Statement:  I spent 45 minutes discharging the patient  This time was spent on the day of discharge  I had direct contact with the patient on the day of discharge  Greater than 50% of the total time was spent examining patient, answering all patient questions, arranging and discussing plan of care with patient as well as directly providing post-discharge instructions  Additional time then spent on discharge activities  Discharge Medications:  See after visit summary for reconciled discharge medications provided to patient and/or family  **Please Note: This note may have been constructed using a voice recognition system**

## 2022-10-21 NOTE — ASSESSMENT & PLAN NOTE
Initially presented to [address] [place] [address] Road after fall at home  · CT AP on admission noting 1 cm obstructing left ureteral calculus in the pelvic portion of the left ureter with mild left hydronephrosis    Subsequently transferred to [facility] for urology evaluation  · Urology signed off,  · Status post cystoscopy with left ureteral stent placement on 10/18  · Maintain White catheter x 10-14 days with outpatient void trial   · Will require outpatient follow-up for staged stone procedure   · Analgesics and antiemetics as needed

## 2022-10-21 NOTE — ASSESSMENT & PLAN NOTE
Straight cathed for 1500 cc on admission to [facility]  · Status post urologic intervention as above  · White catheter to remain in place on discharge with outpatient void trial as per Urology

## 2022-10-21 NOTE — ASSESSMENT & PLAN NOTE
Presented after fall at [facility] as above  · Note, recent right hip fracture status post operative repair earlier this month at [facility]  · Xray right femur here negative for osseous abnormality   · PT/OT evaluations recommending STR, CM following for placement, family requesting that patient does not go back to [facility] on discharge, plan to discharge to [address] [place] [address]

## 2022-10-21 NOTE — ASSESSMENT & PLAN NOTE
Chronic L3 and L4 compression fractures, stable on CT lumbar spine from 10/11/22 at [facility]  · Continue with TLSO brace   · PT/OT, recommending STR  · Continue PRN tylenol and low dose PRN oxycodone 2 5 mg Q4H for pain management  · Consideration for follow up with neurosurgery in the outpatient setting

## 2022-10-21 NOTE — ASSESSMENT & PLAN NOTE
· BP acceptable  · Home losartan 50 mg daily held in setting of KARIE, now resolved, can resume on discharge

## 2022-10-21 NOTE — CASE MANAGEMENT
Case Management Discharge Planning Note    Patient name Starr Franklin  Location [facility] 461-69 MRN [id number]  : 1934 Date 10/21/2022       Current Admission Date: 10/18/2022  Current Admission Diagnosis:Hydronephrosis with urinary obstruction due to renal calculus   Patient Active Problem List    Diagnosis Date Noted   • Hyponatremia 10/20/2022   • Status post fall 10/19/2022   • Urinary retention 10/19/2022   • Acute metabolic encephalopathy    • History of fracture of right hip 10/19/2022   • Lumbar compression fracture ([address] ) 10/19/2022   • Abnormal urinalysis 10/18/2022   • Disorientation 10/18/2022   • Hydronephrosis with urinary obstruction due to renal calculus 10/18/2022   • Essential (primary) hypertension 10/08/2022   • Gastro-esophageal reflux disease without esophagitis 10/08/2022   • Hyperlipidemia 10/08/2022   • Polyneuropathy, peripheral sensorimotor axonal    • Hypothyroidism 2007      LOS (days): 3  Geometric Mean LOS (GMLOS) (days): 5 90  Days to GMLOS:3 1     OBJECTIVE:  Risk of Unplanned Readmission Score: 16         Current admission status: Inpatient   Preferred Pharmacy: No Pharmacies Listed  Primary Care Provider: Leoma Eisenmenger, MD    Primary Insurance: [address] [place] [address] Avenue Number: [id number]

## 2022-10-21 NOTE — ASSESSMENT & PLAN NOTE
Daughter reporting disorientation over the last several weeks; likely multifactorial in the setting of recent hip surgery, obstructive uropathy, KARIE  · Provide frequent reorientation and supportive cares  · Overall mentation is improved and at baseline upon discussion with daughter today

## 2022-10-21 NOTE — CASE MANAGEMENT
Case Management Discharge Planning Note    Patient name Samuel Jama  Location [facility] 250-34 MRN [id number]  : 1934 Date 10/21/2022       Current Admission Date: 10/18/2022  Current Admission Diagnosis:Hydronephrosis with urinary obstruction due to renal calculus   Patient Active Problem List    Diagnosis Date Noted   • Status post fall 10/19/2022   • Urinary retention 10/19/2022   • History of fracture of right hip 10/19/2022   • Lumbar compression fracture ([address] ) 10/19/2022   • Abnormal urinalysis 10/18/2022   • Disorientation 10/18/2022   • Hydronephrosis with urinary obstruction due to renal calculus 10/18/2022   • Essential (primary) hypertension 10/08/2022   • Gastro-esophageal reflux disease without esophagitis 10/08/2022   • Hyperlipidemia 10/08/2022   • Polyneuropathy, peripheral sensorimotor axonal    • Hypothyroidism 2007      LOS (days): 3  Geometric Mean LOS (GMLOS) (days): 5 90  Days to GMLOS:3 1     OBJECTIVE:  Risk of Unplanned Readmission Score: 16 04         Current admission status: Inpatient   Preferred Pharmacy: No Pharmacies Listed  Primary Care Provider: Roxanne Alcantara MD    Primary Insurance: [facility]  Secondary Insurance:     DISCHARGE DETAILS:    Discharge planning discussed with[de-identified] Eron Mccollum daughter  Freedom of Choice: Yes  Comments - Freedom of Choice: updated to d/c at 1630 to [address]  CM contacted family/caregiver?: Yes  Were Treatment Team discharge recommendations reviewed with patient/caregiver?: Yes  Did patient/caregiver verbalize understanding of patient care needs?: N/A- going to facility  Were patient/caregiver advised of the risks associated with not following Treatment Team discharge recommendations?: Yes                                            Transported by [employer] and Unit #): [address]  Name, [facility] [place] : [address] [place] [address]  Receiving Facility/Agency Phone Number: [phone], fax [phone]  Facility/Agency Fax Number: [phone]

## 2022-10-21 NOTE — PLAN OF CARE
Problem: PAIN - ADULT  Goal: Verbalizes/displays adequate comfort level or baseline comfort level  Description: Interventions:  - Encourage patient to monitor pain and request assistance  - Assess pain using appropriate pain scale  - Administer analgesics based on type and severity of pain and evaluate response  - Implement non-pharmacological measures as appropriate and evaluate response  - Consider cultural and social influences on pain and pain management  - Notify physician/advanced practitioner if interventions unsuccessful or patient reports new pain  Outcome: Adequate for Discharge     Problem: INFECTION - ADULT  Goal: Absence or prevention of progression during hospitalization  Description: INTERVENTIONS:  - Assess and monitor for signs and symptoms of infection  - Monitor lab/diagnostic results  - Monitor all insertion sites, i e  indwelling lines, tubes, and drains  - Monitor endotracheal if appropriate and nasal secretions for changes in amount and color  - [place] appropriate cooling/warming therapies per order  - Administer medications as ordered  - Instruct and encourage patient and family to use good hand hygiene technique  - Identify and instruct in appropriate isolation precautions for identified infection/condition  Outcome: Adequate for Discharge  Goal: Absence of fever/infection during neutropenic period  Description: INTERVENTIONS:  - Monitor WBC    Outcome: Adequate for Discharge     Problem: SAFETY ADULT  Goal: Patient will remain free of falls  Description: INTERVENTIONS:  - Educate patient/family on patient safety including physical limitations  - Instruct patient to call for assistance with activity   - Consult OT/PT to assist with strengthening/mobility   - Keep Call bell within reach  - Keep bed low and locked with side rails adjusted as appropriate  - Keep care items and personal belongings within reach  - Initiate and maintain comfort rounds  - Make Fall Risk Sign visible to staff  - Offer Toileting every  Hours, in advance of need  - Initiate/Maintain alarm  - Obtain necessary fall risk management equipment:   - Apply yellow socks and bracelet for high fall risk patients  - Consider moving patient to room near nurses station  Outcome: Adequate for Discharge  Goal: Maintain or return to baseline ADL function  Description: INTERVENTIONS:  -  Assess patient's ability to carry out ADLs; assess patient's baseline for ADL function and identify physical deficits which impact ability to perform ADLs (bathing, care of mouth/teeth, toileting, grooming, dressing, etc )  - Assess/evaluate cause of self-care deficits   - Assess range of motion  - Assess patient's mobility; develop plan if impaired  - Assess patient's need for assistive devices and provide as appropriate  - Encourage maximum independence but intervene and supervise when necessary  - Involve family in performance of ADLs  - Assess for home care needs following discharge   - Consider OT consult to assist with ADL evaluation and planning for discharge  - Provide patient education as appropriate  Outcome: Adequate for Discharge  Goal: Maintains/Returns to pre admission functional level  Description: INTERVENTIONS:  - Perform BMAT or MOVE assessment daily    - Set and communicate daily mobility goal to care team and patient/family/caregiver  - Collaborate with rehabilitation services on mobility goals if consulted  - Perform Range of Motion  times a day  - Reposition patient every  hours    - Dangle patient  times a day  - Stand patient  times a day  - Ambulate patient  times a day  - Out of bed to chair  times a day   - Out of bed for meal times a day  - Out of bed for toileting  - Record patient progress and toleration of activity level   Outcome: Adequate for Discharge     Problem: Knowledge Deficit  Goal: Patient/family/caregiver demonstrates understanding of disease process, treatment plan, medications, and discharge instructions  Description: Complete learning assessment and assess knowledge base    Interventions:  - Provide teaching at level of understanding  - Provide teaching via preferred learning methods  Outcome: Adequate for Discharge     Problem: DISCHARGE PLANNING  Goal: Discharge to home or other facility with appropriate resources  Description: INTERVENTIONS:  - Identify barriers to discharge w/patient and caregiver  - Arrange for needed discharge resources and transportation as appropriate  - Identify discharge learning needs (meds, wound care, etc )  - Arrange for interpretive services to assist at discharge as needed  - Refer to Case Management Department for coordinating discharge planning if the patient needs post-hospital services based on physician/advanced practitioner order or complex needs related to functional status, cognitive ability, or social support system  Outcome: Adequate for Discharge     Problem: Prexisting or High Potential for Compromised Skin Integrity  Goal: Skin integrity is maintained or improved  Description: INTERVENTIONS:  - Identify patients at risk for skin breakdown  - Assess and monitor skin integrity  - Assess and monitor nutrition and hydration status  - Monitor labs   - Assess for incontinence   - Turn and reposition patient  - Assist with mobility/ambulation  - Relieve pressure over bony prominences  - Avoid friction and shearing  - Provide appropriate hygiene as needed including keeping skin clean and dry  - Evaluate need for skin moisturizer/barrier cream  - Collaborate with interdisciplinary team   - Patient/family teaching  - Consider wound care consult   Outcome: Adequate for Discharge     Problem: Potential for Falls  Goal: Patient will remain free of falls  Description: INTERVENTIONS:  - Educate patient/family on patient safety including physical limitations  - Instruct patient to call for assistance with activity   - Consult OT/PT to assist with strengthening/mobility   - Keep Call bell within reach  - Keep bed low and locked with side rails adjusted as appropriate  - Keep care items and personal belongings within reach  - Initiate and maintain comfort rounds  - Make Fall Risk Sign visible to staff  - Offer Toileting every  Hours, in advance of need  - Initiate/Maintain alarm  - Obtain necessary fall risk management equipment:   - Apply yellow socks and bracelet for high fall risk patients  - Consider moving patient to room near nurses station  Outcome: Adequate for Discharge

## 2022-10-24 ENCOUNTER — NURSING HOME VISIT (OUTPATIENT)
Dept: GERIATRICS | Facility: OTHER | Age: 87
End: 2022-10-24
Payer: COMMERCIAL

## 2022-10-24 VITALS
TEMPERATURE: 97.3 F | WEIGHT: 153 LBS | OXYGEN SATURATION: 96 % | DIASTOLIC BLOOD PRESSURE: 54 MMHG | HEART RATE: 73 BPM | RESPIRATION RATE: 18 BRPM | SYSTOLIC BLOOD PRESSURE: 101 MMHG | BODY MASS INDEX: 22.27 KG/M2

## 2022-10-24 DIAGNOSIS — R41.0 DISORIENTATION: ICD-10-CM

## 2022-10-24 DIAGNOSIS — S32.000D COMPRESSION FRACTURE OF LUMBAR VERTEBRA WITH ROUTINE HEALING, UNSPECIFIED LUMBAR VERTEBRAL LEVEL, SUBSEQUENT ENCOUNTER: ICD-10-CM

## 2022-10-24 DIAGNOSIS — N13.2 HYDRONEPHROSIS WITH URINARY OBSTRUCTION DUE TO RENAL CALCULUS: Primary | ICD-10-CM

## 2022-10-24 DIAGNOSIS — Z91.81 STATUS POST FALL: ICD-10-CM

## 2022-10-24 DIAGNOSIS — K21.9 GASTRO-ESOPHAGEAL REFLUX DISEASE WITHOUT ESOPHAGITIS: ICD-10-CM

## 2022-10-24 DIAGNOSIS — E03.9 ACQUIRED HYPOTHYROIDISM: ICD-10-CM

## 2022-10-24 DIAGNOSIS — E87.1 HYPONATREMIA: ICD-10-CM

## 2022-10-24 DIAGNOSIS — R33.9 URINARY RETENTION: ICD-10-CM

## 2022-10-24 DIAGNOSIS — I10 ESSENTIAL (PRIMARY) HYPERTENSION: ICD-10-CM

## 2022-10-24 DIAGNOSIS — Z87.81 HISTORY OF FRACTURE OF RIGHT HIP: ICD-10-CM

## 2022-10-24 PROCEDURE — 99305 1ST NF CARE MODERATE MDM 35: CPT | Performed by: FAMILY MEDICINE

## 2022-10-24 NOTE — UTILIZATION REVIEW
NOTIFICATION OF ADMISSION DISCHARGE   This is a Notification of Discharge from [address]  Please be advised that this patient has been discharge from our facility  Below you will find the admission and discharge date and time including the patient’s disposition  UTILIZATION REVIEW CONTACT:  Johnny Barnett  Utilization   Network Utilization Review Department  Phone: [phone] x carefully listen to the prompts  All voicemails are confidential   Email: [email or web address]     ADMISSION INFORMATION  PRESENTATION DATE: 10/18/2022  4:21 PM  OBERVATION ADMISSION DATE:   INPATIENT ADMISSION DATE: 10/18/22  4:21 PM   DISCHARGE DATE: 10/21/2022  7:18 PM  DISPOSITION: Released to SNF/TCU/SNU Facility Released to SNF/TCU/SNU Facility      IMPORTANT INFORMATION:  Send all requests for admission clinical reviews, approved or denied determinations and any other requests to dedicated fax number below belonging to the campus where the patient is receiving treatment   List of dedicated fax numbers:  [address] DENIALS (Administrative/Medical Necessity) [phone]   [address] (Maternity/NICU/Pediatrics) [phone]   [facility] [phone]   [place] [address] [phone]   [address] [phone]   220 [facility] [phone]   [address] [place] [address] [phone]   [address] [place] [address] 119 [phone]   [facility]  [phone]   405 [facility] [phone]   412 [facility] [address] [facility] [phone]

## 2022-10-24 NOTE — ASSESSMENT & PLAN NOTE
Patient presented after a fall at [place] [address], he had a recent hip fracture, x-ray of the right femur was negative for any abnormality

## 2022-10-24 NOTE — PROGRESS NOTES
[facility]    History and Physical  POS: 31    Records Reviewed include: Hospital records      Chief Complaint/ Reason for Admission: status post fall     History of Present Illness:     59-year-old male with a past medical history significant for hypothyroidism, hyperlipidemia, GERD, hypertension presented to the hospital on 10/18 due to a fall at [facility]  Patient was noted to have acute urinary retention, on further workup imaging suggestive of a distal stone on the left with hydronephrosis  He had a recent hip fracture that was repaired at [facility] with associated compression fractures  During his hospital stay he was continued on supportive care, pain management with low-dose oxycodone  Patient was evaluated by Urology and underwent cystoscopy with ureteral stent insertion on the left and White catheter was placed  His KARIE resolved he was treated with 3 days of IV antibiotics  Patient was evaluated by PT and OT they recommended post acute rehab services  Patient was seen this morning, his very pleasant and cooperative he does not have any acute complaint  He denies any pain             Allergies  No Known Allergies    Past Medical History  Past Medical History:   Diagnosis Date   • BPH (benign prostatic hyperplasia)    • Hyperlipidemia    • Hypertension    • Hypothyroidism    • Peripheral vascular disease ([address] )         Past Surgical History:   Procedure Laterality Date   • FL RETROGRADE PYELOGRAM  10/18/2022   • JOINT REPLACEMENT Right     hip   • ID CYSTOURETHROSCOPY,URETER CATHETER Left 10/18/2022    Procedure: CYSTOSCOPY RETROGRADE PYELOGRAM WITH INTERPRETATION AND WITH INSERTION STENT URETERAL;  Surgeon: Carlos Manuel Grissom MD;  Location: [facility];  Service: Urology       Family History  History reviewed  No pertinent family history      Social History  Social History     Tobacco Use   Smoking Status Former Smoker   • Types: Cigars, Pipe   Smokeless Tobacco Never Used      Social History Substance and Sexual Activity   Alcohol Use None      Social History     Substance and Sexual Activity   Drug Use Not on file        Lives: [place],  Social Support: good social support   Fall in the past 12 months: yes   Use of assistance Device: Walker    Physical Exam    Vital Signs    Vitals:    10/24/22 1132   BP: 101/54   Pulse: 73   Resp: 18   Temp: (!) 97 3 °F (36 3 °C)   SpO2: 96%           Constitutional: Frail appearing patient       Physical Exam  Vitals reviewed  HENT:      Head: Normocephalic  Nose: Nose normal       Mouth/Throat:      Mouth: Mucous membranes are dry  Eyes:      General: No scleral icterus  Cardiovascular:      Rate and Rhythm: Normal rate  Heart sounds: Normal heart sounds  Pulmonary:      Breath sounds: Normal breath sounds  Abdominal:      General: There is no distension  Palpations: Abdomen is soft  Tenderness: There is no abdominal tenderness  There is no guarding  Musculoskeletal:      Right lower leg: No edema  Left lower leg: No edema  Skin:     General: Skin is warm  Comments: Surgical wound without signs of inflammation, no discharge seen    Neurological:      Mental Status: He is alert  Mental status is at baseline  Psychiatric:         Mood and Affect: Mood normal          Review of Systems:  Review of Systems   Constitutional: Negative for fatigue  Eyes: Negative for pain  Respiratory: Negative for shortness of breath  Cardiovascular: Negative for chest pain and leg swelling  Gastrointestinal: Negative for diarrhea, nausea and vomiting  Poor oral intake    Endocrine: Negative for cold intolerance and heat intolerance  Musculoskeletal: Positive for arthralgias and gait problem  Skin: Positive for wound  Neurological: Negative for dizziness  List of Current Medications:    Medication reviewed  All orders signed  Complete list is in the paper chart       Allergies  No Known Allergies    Labs/Diagnostics (reviewed by this provider): I personally reviewed lab results and imaging studies  Full reports are in the paper chart  Assessment/Plan:  70-year-old male with a past medical history significant for hypothyroidism hyperlipidemia, GERD, hypertension who presented to the hospital after fall, noted to have urinary retention and distal stone on the left with hydronephrosis status post stent placement  History of fracture of right hip  Patient had an acute impacted comminuted subcapital femur fracture of the right hip on 10/08/2022, he is status postoperative fixation at [facility]  Continue pain management with oxycodone 2 5 mg every 4 hours as needed for moderate-to-severe pain, can give Tylenol 650 mg every 6 hours as needed for mild pain  Continue PT and OT here in the facility      Status post fall  Patient presented after a fall at [place] [address], he had a recent hip fracture, x-ray of the right femur was negative for any abnormality  Disorientation  Apparently had some confusion during recent hospitalization, now this has improved  Patient is at high risk for delirium due to advance age, multiple comorbidities, recent falls and surgery  Continue delirium precautions, frequent reorientation, avoid deliriogenic medications such as hypnotics and anticholinergics  Urinary retention  Patient noted having urinary retention, status post urologic intervention  White catheter to remain in place with outpatient voiding trial as per Urology  Voiding trial on Monday 10/31      Hydronephrosis with urinary obstruction due to renal calculus  Patient presented after fall, CT of the abdomen and pelvis noting 1 cm obstructing left ureteral calculus in the pelvic portion of the left ureter with mild left hydronephrosis    Patient is status post cystoscopy with left ureteral stent placement on 10/18  Patient to maintain White catheter for 10-14 days with outpatient voiding trial  Follow-up with urology as scheduled    Lumbar compression fracture (HCC)  Chronic L3 and L4 compression fractures, stable on CT of the lumbar spine  Continue p r n  Tylenol and low-dose oxycodone for pain  Continue with TLSO brace  Follow-up with Neurosurgery as scheduled    Essential (primary) hypertension  BP in the lower side, will decrease losartan to 25 mg daily    Hypothyroidism  Noted mildly elevated TSH of 7 7  Continue levothyroxine 125 mcg daily    Gastro-esophageal reflux disease without esophagitis  Continue Prilosec 20 mg daily    Hyponatremia  Noted sodium 130 this morning, patient was treated for hypo smaller hyponatremia with IV fluids in the hospital   Patient endorses poor fluid intake, encourage oral hydration ( patient likes cranberry juice) , will liberalize fluid intake and repeat BMP tomorrow  Pain: 1  Rehab Potential:Fair  Patient Informed of Medical Condition:  Yes  Patient is Capable of Understanding Their Right:  Yes  Discharge Plan:   To be decided  Vaccination:   Immunization History   Administered Date(s) Administered   • COVID-19 PFIZER VACCINE 0 3 ML IM 02/09/2021, 03/02/2021   • COVID-19 Pfizer Vac BIVALENT Boom-sucrose 12 Yr+ IM (BOOSTER ONLY) 10/21/2022     Advanced Directives: yes: No  Code status:Full Code  PCP: Audie Valladares MD  PCP - [facility] PGY-4  Geriatric Medicine  24/67/63110:99 PM

## 2022-10-24 NOTE — ASSESSMENT & PLAN NOTE
Patient had an acute impacted comminuted subcapital femur fracture of the right hip on 10/08/2022, he is status postoperative fixation at [facility]  Continue pain management with oxycodone 2 5 mg every 4 hours as needed for moderate-to-severe pain, can give Tylenol 650 mg every 6 hours as needed for mild pain    Continue PT and OT here in the facility

## 2022-10-24 NOTE — ASSESSMENT & PLAN NOTE
Noted sodium 130 this morning, patient was treated for hypo smaller hyponatremia with IV fluids in the hospital   Patient endorses poor fluid intake, encourage oral hydration ( patient likes cranberry juice) , will liberalize fluid intake and repeat BMP tomorrow

## 2022-10-24 NOTE — ASSESSMENT & PLAN NOTE
Patient noted having urinary retention, status post urologic intervention  White catheter to remain in place with outpatient voiding trial as per Urology     Voiding trial on Monday 10/31

## 2022-10-24 NOTE — ASSESSMENT & PLAN NOTE
Patient presented after fall, CT of the abdomen and pelvis noting 1 cm obstructing left ureteral calculus in the pelvic portion of the left ureter with mild left hydronephrosis    Patient is status post cystoscopy with left ureteral stent placement on 10/18  Patient to maintain White catheter for 10-14 days with outpatient voiding trial  Follow-up with urology as scheduled

## 2022-10-24 NOTE — ASSESSMENT & PLAN NOTE
Chronic L3 and L4 compression fractures, stable on CT of the lumbar spine  Continue p r n   Tylenol and low-dose oxycodone for pain  Continue with TLSO brace  Follow-up with Neurosurgery as scheduled

## 2022-10-24 NOTE — ASSESSMENT & PLAN NOTE
Apparently had some confusion during recent hospitalization, now this has improved  Patient is at high risk for delirium due to advance age, multiple comorbidities, recent falls and surgery  Continue delirium precautions, frequent reorientation, avoid deliriogenic medications such as hypnotics and anticholinergics

## 2022-10-26 ENCOUNTER — NURSING HOME VISIT (OUTPATIENT)
Dept: GERIATRICS | Facility: OTHER | Age: 87
End: 2022-10-26
Payer: COMMERCIAL

## 2022-10-26 VITALS
DIASTOLIC BLOOD PRESSURE: 53 MMHG | TEMPERATURE: 97.7 F | WEIGHT: 194.3 LBS | OXYGEN SATURATION: 99 % | RESPIRATION RATE: 20 BRPM | HEART RATE: 75 BPM | BODY MASS INDEX: 28.28 KG/M2 | SYSTOLIC BLOOD PRESSURE: 109 MMHG

## 2022-10-26 DIAGNOSIS — I10 ESSENTIAL (PRIMARY) HYPERTENSION: ICD-10-CM

## 2022-10-26 DIAGNOSIS — K21.9 GASTRO-ESOPHAGEAL REFLUX DISEASE WITHOUT ESOPHAGITIS: ICD-10-CM

## 2022-10-26 DIAGNOSIS — R41.89 COGNITIVE DECLINE: Primary | ICD-10-CM

## 2022-10-26 DIAGNOSIS — E87.1 HYPONATREMIA: ICD-10-CM

## 2022-10-26 DIAGNOSIS — E03.9 ACQUIRED HYPOTHYROIDISM: ICD-10-CM

## 2022-10-26 PROCEDURE — 99309 SBSQ NF CARE MODERATE MDM 30: CPT | Performed by: FAMILY MEDICINE

## 2022-10-26 NOTE — PROGRESS NOTES
[address]  [phone]  [facility] of Service: nursing home place of service: POS 31 Skilled Care-Part A Coverage      NAME: Hemant Leija  AGE: 80 y o  SEX: male [id number]    DATE OF ENCOUNTER: 10/26/2022    Assessment and Plan     1  Gastro-esophageal reflux disease without esophagitis  Assessment & Plan:  Continue Prilosec 20 mg daily      2  Acquired hypothyroidism  Assessment & Plan:  Noted mildly elevated TSH of 7 7  Continue levothyroxine 125 mcg daily      3  Essential (primary) hypertension  Assessment & Plan:  Continue losartan 25 mg daily      4  Cognitive decline  Assessment & Plan:  MOCA 10/25/22-18/30   TSH 7 7, B12 269 in October   Will start B12 supplementation and recheck TSH   PT/OT ongoing          5  Hyponatremia  Assessment & Plan:  Encourage PO intake   Will recheck again             Chief Complaint     Follow up No chief complaint on file  History of Present Illness     Hemant Leija is a 80 y o  male who was seen today for follow up  Noted some memory issues, O/T completed a [place] test, scored 18/30  B12 noted to be borderline low, will start vitamin B12 supplementation  Patient has had mild hyponatremia, encourage p o  Intake  Patient complaining of some hip pain after physical therapy today  No other concerns as per nursing staff  The following portions of the patient's history were reviewed and updated as appropriate: allergies, current medications, past family history, past medical history, past social history, past surgical history and problem list     Review of Systems     Review of Systems   Musculoskeletal: Positive for arthralgias  All other systems reviewed and are negative        Active Problem List     Patient Active Problem List   Diagnosis   • Polyneuropathy, peripheral sensorimotor axonal   • Abnormal urinalysis   • Disorientation   • Hydronephrosis with urinary obstruction due to renal calculus   • Essential (primary) hypertension   • Gastro-esophageal reflux disease without esophagitis   • Hyperlipidemia   • Hypothyroidism   • Status post fall   • Urinary retention   • History of fracture of right hip   • Lumbar compression fracture (HCC)   • Hyponatremia   • Cognitive decline       Objective     Vital Signs:     Blood pressure 109/53 Heart Rate:  75 Respiratory Rate 20  Temperature 97 7 Oxygen Saturation 99%    Physical Exam  Vitals reviewed  HENT:      Head: Normocephalic  Nose: Nose normal       Mouth/Throat:      Mouth: Mucous membranes are moist    Eyes:      General: No scleral icterus  Cardiovascular:      Rate and Rhythm: Normal rate  Heart sounds: Normal heart sounds  Pulmonary:      Breath sounds: Normal breath sounds  Abdominal:      General: There is no distension  Palpations: Abdomen is soft  Tenderness: There is no abdominal tenderness  There is no guarding  Musculoskeletal:      Right lower leg: No edema  Left lower leg: No edema  Skin:     General: Skin is warm  Neurological:      Mental Status: He is alert and oriented to person, place, and time  Psychiatric:         Mood and Affect: Mood normal          Pertinent Laboratory/Diagnostic Studies:  Laboratory and Imaging studies reviewed  Full report in the paper chart  Current Medications   Medications reviewed and updated in facility chart      Name: Ganga Brian  : 1934  MRN: [id number]        [address] [facility]  10/26/2022 2:48 PM

## 2022-10-26 NOTE — ASSESSMENT & PLAN NOTE
MOCA 10/25/22-18/30   TSH 7 7, B12 269 in October   Will start B12 supplementation and recheck TSH   PT/OT ongoing

## 2022-10-31 ENCOUNTER — NURSING HOME VISIT (OUTPATIENT)
Dept: GERIATRICS | Facility: OTHER | Age: 87
End: 2022-10-31

## 2022-10-31 DIAGNOSIS — N13.2 HYDRONEPHROSIS WITH URINARY OBSTRUCTION DUE TO RENAL CALCULUS: Primary | ICD-10-CM

## 2022-10-31 DIAGNOSIS — I10 ESSENTIAL (PRIMARY) HYPERTENSION: ICD-10-CM

## 2022-10-31 DIAGNOSIS — E03.9 ACQUIRED HYPOTHYROIDISM: ICD-10-CM

## 2022-10-31 DIAGNOSIS — K21.9 GASTRO-ESOPHAGEAL REFLUX DISEASE WITHOUT ESOPHAGITIS: ICD-10-CM

## 2022-10-31 DIAGNOSIS — S32.000D COMPRESSION FRACTURE OF LUMBAR VERTEBRA WITH ROUTINE HEALING, UNSPECIFIED LUMBAR VERTEBRAL LEVEL, SUBSEQUENT ENCOUNTER: ICD-10-CM

## 2022-10-31 NOTE — PROGRESS NOTES
[address]  [phone]  [facility] 79 of Service: nursing home place of service: POS 31 Skilled Care-Part A Coverage      NAME: Toni Hoang  AGE: 80 y o  SEX: male [id number]    DATE OF ENCOUNTER: 10/31/2022    Assessment and Plan     1  Hydronephrosis with urinary obstruction due to renal calculus  Assessment & Plan:  Patient presented after fall, CT of the abdomen and pelvis noting 1 cm obstructing left ureteral calculus in the pelvic portion of the left ureter with mild left hydronephrosis  Patient is status post cystoscopy with left ureteral stent placement on 10/18  Patient to maintain White catheter for 10-14 days with outpatient voiding trial  Follow-up with urology as scheduled      2  Compression fracture of lumbar vertebra with routine healing, unspecified lumbar vertebral level, subsequent encounter  Assessment & Plan:  Chronic L3 and L4 compression fractures, stable on CT of the lumbar spine  Continue p r n  Tylenol and low-dose oxycodone for pain  Continue with TLSO brace  Follow-up with Neurosurgery as scheduled      3  Essential (primary) hypertension  Assessment & Plan:  BP in the lower side  Will discontinue losartan and monitor closely       4  Acquired hypothyroidism  Assessment & Plan:  Noted mildly elevated TSH of 7 7  Continue levothyroxine 125 mcg daily      5  Gastro-esophageal reflux disease without esophagitis  Assessment & Plan:  Continue Prilosec 20 mg daily            Chief Complaint     Follow up No chief complaint on file  History of Present Illness     Toni Hoang is a 80 y o  male who was seen today for follow up             The following portions of the patient's history were reviewed and updated as appropriate: allergies, current medications, past family history, past medical history, past social history, past surgical history and problem list     Review of Systems     Review of Systems Constitutional: Negative for fatigue  HENT: Negative for dental problem  Eyes: Negative for pain  Respiratory: Negative for chest tightness and shortness of breath  Cardiovascular: Negative for chest pain, palpitations and leg swelling  Gastrointestinal: Negative for abdominal distention, abdominal pain and anal bleeding  Musculoskeletal: Positive for arthralgias  Psychiatric/Behavioral: Negative for agitation and behavioral problems  Active Problem List     Patient Active Problem List   Diagnosis   • Polyneuropathy, peripheral sensorimotor axonal   • Abnormal urinalysis   • Disorientation   • Hydronephrosis with urinary obstruction due to renal calculus   • Essential (primary) hypertension   • Gastro-esophageal reflux disease without esophagitis   • Hyperlipidemia   • Hypothyroidism   • Status post fall   • Urinary retention   • History of fracture of right hip   • Lumbar compression fracture (HCC)   • Hyponatremia   • Cognitive decline       Objective     Vital Signs:     Blood pressure 94/74 Heart Rate: 79 Respiratory Rate 17  Temperature 97 3 Oxygen Saturation 94% Weight 155 1    Physical Exam  Vitals reviewed  Constitutional:       General: He is not in acute distress  HENT:      Head: Normocephalic  Mouth/Throat:      Mouth: Mucous membranes are moist    Eyes:      General: No scleral icterus  Cardiovascular:      Rate and Rhythm: Normal rate  Heart sounds: Normal heart sounds  Pulmonary:      Breath sounds: Normal breath sounds  Abdominal:      General: There is no distension  Palpations: Abdomen is soft  Tenderness: There is no abdominal tenderness  There is no guarding  Musculoskeletal:      Right lower leg: No edema  Left lower leg: No edema  Skin:     General: Skin is warm  Neurological:      Mental Status: He is alert and oriented to person, place, and time     Psychiatric:         Mood and Affect: Mood normal          Pertinent Laboratory/Diagnostic Studies:  Laboratory and Imaging studies reviewed  Full report in the paper chart  Current Medications   Medications reviewed and updated in facility chart      Name: Evan Seen  : 1934  MRN: [id number]        [employer] PGY 4   Geriatric Medicine  10/31/2022 9:41 AM

## 2022-11-03 ENCOUNTER — NURSING HOME VISIT (OUTPATIENT)
Dept: GERIATRICS | Facility: OTHER | Age: 87
End: 2022-11-03

## 2022-11-03 DIAGNOSIS — R41.89 COGNITIVE DECLINE: Primary | ICD-10-CM

## 2022-11-03 DIAGNOSIS — S32.000D COMPRESSION FRACTURE OF LUMBAR VERTEBRA WITH ROUTINE HEALING, UNSPECIFIED LUMBAR VERTEBRAL LEVEL, SUBSEQUENT ENCOUNTER: ICD-10-CM

## 2022-11-03 DIAGNOSIS — E03.9 ACQUIRED HYPOTHYROIDISM: ICD-10-CM

## 2022-11-03 DIAGNOSIS — E87.1 HYPONATREMIA: ICD-10-CM

## 2022-11-03 DIAGNOSIS — N13.2 HYDRONEPHROSIS WITH URINARY OBSTRUCTION DUE TO RENAL CALCULUS: ICD-10-CM

## 2022-11-03 DIAGNOSIS — K59.03 DRUG-INDUCED CONSTIPATION: ICD-10-CM

## 2022-11-03 NOTE — ASSESSMENT & PLAN NOTE
MOCA 10/25/22-18/30   TSH 7 7, B12 269 in October, recheck and f/u with PCP    Continue B12 supplementation   PT/OT ongoing

## 2022-11-03 NOTE — ASSESSMENT & PLAN NOTE
Patient on low dose oxycodone for hip pain   Bowel regimen: senokot 2 tabs daily, miralax 17gm daily   Increase fiber in diet, adequate hydration   Out of bed and ambulate as able

## 2022-11-03 NOTE — PROGRESS NOTES
[address]  [phone]  [facility] 79 of Service: nursing home place of service: POS 31 Skilled Care-Part A Coverage      NAME: Naomia Holter  AGE: 80 y o  SEX: male [id number]    DATE OF ENCOUNTER: 11/3/2022    Assessment and Plan     1  Cognitive decline  Assessment & Plan:  MOCA 10/25/22-18/30   TSH 7 7, B12 269 in October, recheck and f/u with PCP    Continue B12 supplementation   PT/OT ongoing          2  Hyponatremia  Assessment & Plan:  Encourage PO intake   Will recheck again Friday       3  Hydronephrosis with urinary obstruction due to renal calculus  Assessment & Plan:  Patient presented after fall, CT of the abdomen and pelvis noting 1 cm obstructing left ureteral calculus in the pelvic portion of the left ureter with mild left hydronephrosis  Patient is status post cystoscopy with left ureteral stent placement on 10/18  Patient to maintain White catheter for 10-14 days with outpatient voiding trial  Failed voiding trial on 10/28  Follow-up with urology as scheduled      4  Acquired hypothyroidism  Assessment & Plan:  Noted mildly elevated TSH of 7 7  Continue levothyroxine 125 mcg daily  Recheck TSH in 4 weeks , adjust dose as needed       5  Compression fracture of lumbar vertebra with routine healing, unspecified lumbar vertebral level, subsequent encounter  Assessment & Plan:  Chronic L3 and L4 compression fractures, stable on CT of the lumbar spine  Continue p r n  Tylenol and low-dose oxycodone for pain  Continue with TLSO brace  Follow-up with Neurosurgery as scheduled      6  Drug-induced constipation  Assessment & Plan:  Patient on low dose oxycodone for hip pain   Bowel regimen: senokot 2 tabs daily, miralax 17gm daily   Increase fiber in diet, adequate hydration   Out of bed and ambulate as able  Chief Complaint     Follow up constipation     History of Present Illness     Naomia Holter is a 80 y o  male who was seen today for follow up  He have not have a BM in 4 days and feels very uncomfortable, he is eating, no nausea or vomiting  Denies abdominal pian, still complaining of hip pain, although is better  The following portions of the patient's history were reviewed and updated as appropriate: allergies, current medications, past family history, past medical history, past social history, past surgical history and problem list     Review of Systems     Review of Systems   Constitutional: Negative for fatigue and fever  HENT: Positive for trouble swallowing  Eyes: Negative for visual disturbance  Respiratory: Negative for cough, shortness of breath and stridor  Cardiovascular: Negative for chest pain, palpitations and leg swelling  Gastrointestinal: Positive for constipation  Negative for abdominal pain, nausea and vomiting  Endocrine: Negative for cold intolerance and heat intolerance  Musculoskeletal:        Hip pain    Neurological: Negative for speech difficulty  Psychiatric/Behavioral: Negative for confusion  Active Problem List     Patient Active Problem List   Diagnosis   • Polyneuropathy, peripheral sensorimotor axonal   • Abnormal urinalysis   • Disorientation   • Hydronephrosis with urinary obstruction due to renal calculus   • Essential (primary) hypertension   • Gastro-esophageal reflux disease without esophagitis   • Hyperlipidemia   • Hypothyroidism   • Status post fall   • Urinary retention   • History of fracture of right hip   • Lumbar compression fracture (HCC)   • Hyponatremia   • Cognitive decline       Objective     Vital Signs:     Blood pressure 123/55 Heart Rate: 75 Respiratory Rate 17   Temperature 97 Oxygen Saturation 96 Weight 154 8    Physical Exam  Vitals reviewed  Constitutional:       General: He is not in acute distress  Appearance: He is not toxic-appearing or diaphoretic  HENT:      Head: Normocephalic        Nose: Nose normal  Mouth/Throat:      Mouth: Mucous membranes are moist    Eyes:      General: No scleral icterus  Cardiovascular:      Rate and Rhythm: Normal rate  Heart sounds: Normal heart sounds  Pulmonary:      Breath sounds: Normal breath sounds  Abdominal:      General: There is no distension  Palpations: Abdomen is soft  Tenderness: There is no abdominal tenderness  Musculoskeletal:      Right lower leg: No edema  Left lower leg: No edema  Skin:     General: Skin is warm  Neurological:      Mental Status: He is alert  Mental status is at baseline  Psychiatric:         Mood and Affect: Mood normal          Pertinent Laboratory/Diagnostic Studies:  Laboratory and Imaging studies reviewed  Full report in the paper chart  Current Medications   Medications reviewed and updated in facility chart      Name: Jennie Hui  : 1934  MRN: [id number]        Laila Mcknight PGY 4   Geriatric Medicine  11/3/2022 9:22 AM Spine appears normal, range of motion is not limited, no muscle or joint tenderness

## 2022-11-03 NOTE — ASSESSMENT & PLAN NOTE
Patient presented after fall, CT of the abdomen and pelvis noting 1 cm obstructing left ureteral calculus in the pelvic portion of the left ureter with mild left hydronephrosis    Patient is status post cystoscopy with left ureteral stent placement on 10/18  Patient to maintain White catheter for 10-14 days with outpatient voiding trial  Failed voiding trial on 10/28  Follow-up with urology as scheduled

## 2022-11-03 NOTE — ASSESSMENT & PLAN NOTE
Noted mildly elevated TSH of 7 7  Continue levothyroxine 125 mcg daily  Recheck TSH in 4 weeks , adjust dose as needed

## 2022-11-04 ENCOUNTER — TELEPHONE (OUTPATIENT)
Dept: UROLOGY | Facility: CLINIC | Age: 87
End: 2022-11-04

## 2022-11-04 ENCOUNTER — NURSING HOME VISIT (OUTPATIENT)
Dept: GERIATRICS | Facility: OTHER | Age: 87
End: 2022-11-04

## 2022-11-04 ENCOUNTER — TELEPHONE (OUTPATIENT)
Dept: UROLOGY | Facility: MEDICAL CENTER | Age: 87
End: 2022-11-04

## 2022-11-04 DIAGNOSIS — M54.9 CHRONIC BACK PAIN, UNSPECIFIED BACK LOCATION, UNSPECIFIED BACK PAIN LATERALITY: ICD-10-CM

## 2022-11-04 DIAGNOSIS — Z87.81 HISTORY OF FRACTURE OF RIGHT HIP: ICD-10-CM

## 2022-11-04 DIAGNOSIS — N13.2 HYDRONEPHROSIS WITH URINARY OBSTRUCTION DUE TO RENAL CALCULUS: ICD-10-CM

## 2022-11-04 DIAGNOSIS — S32.000D COMPRESSION FRACTURE OF LUMBAR VERTEBRA WITH ROUTINE HEALING, UNSPECIFIED LUMBAR VERTEBRAL LEVEL, SUBSEQUENT ENCOUNTER: ICD-10-CM

## 2022-11-04 DIAGNOSIS — K21.9 GASTRO-ESOPHAGEAL REFLUX DISEASE WITHOUT ESOPHAGITIS: Primary | ICD-10-CM

## 2022-11-04 DIAGNOSIS — E87.1 HYPONATREMIA: ICD-10-CM

## 2022-11-04 DIAGNOSIS — R41.89 COGNITIVE DECLINE: ICD-10-CM

## 2022-11-04 DIAGNOSIS — K59.03 DRUG-INDUCED CONSTIPATION: ICD-10-CM

## 2022-11-04 DIAGNOSIS — G89.29 CHRONIC BACK PAIN, UNSPECIFIED BACK LOCATION, UNSPECIFIED BACK PAIN LATERALITY: ICD-10-CM

## 2022-11-04 DIAGNOSIS — E53.8 B12 DEFICIENCY: ICD-10-CM

## 2022-11-04 DIAGNOSIS — I10 ESSENTIAL (PRIMARY) HYPERTENSION: ICD-10-CM

## 2022-11-04 DIAGNOSIS — E03.9 ACQUIRED HYPOTHYROIDISM: ICD-10-CM

## 2022-11-04 DIAGNOSIS — Z01.818 PREOPERATIVE CLEARANCE: ICD-10-CM

## 2022-11-04 RX ORDER — LIDOCAINE 50 MG/G
1 PATCH TOPICAL DAILY
Qty: 15 PATCH | Refills: 0 | Status: SHIPPED | OUTPATIENT
Start: 2022-11-04 | End: 2022-11-19

## 2022-11-04 RX ORDER — GABAPENTIN 100 MG/1
100 CAPSULE ORAL
Qty: 15 CAPSULE | Refills: 0 | Status: SHIPPED | OUTPATIENT
Start: 2022-11-04

## 2022-11-04 RX ORDER — LANOLIN ALCOHOL/MO/W.PET/CERES
1000 CREAM (GRAM) TOPICAL DAILY
Qty: 30 TABLET | Refills: 0 | Status: SHIPPED | OUTPATIENT
Start: 2022-11-04 | End: 2022-12-04

## 2022-11-04 RX ORDER — AMOXICILLIN 250 MG
2 CAPSULE ORAL EVERY EVENING
Qty: 60 TABLET | Refills: 11 | Status: SHIPPED | OUTPATIENT
Start: 2022-11-04 | End: 2023-11-04

## 2022-11-04 NOTE — TELEPHONE ENCOUNTER
Patient's daughter calling with questions about upcoming procedure    She is requesting a call back at [phone]

## 2022-11-04 NOTE — ASSESSMENT & PLAN NOTE
MOCA 10/25/22-18/30   TSH 7 7, B12 269 in October, recheck and f/u with PCP    Continue B12 supplementation

## 2022-11-04 NOTE — ASSESSMENT & PLAN NOTE
Patient presented after fall, CT of the abdomen and pelvis noting 1 cm obstructing left ureteral calculus in the pelvic portion of the left ureter with mild left hydronephrosis  Patient is status post cystoscopy with left ureteral stent placement on 10/18  Patient to maintain White catheter for 10-14 days with outpatient voiding trial  Failed voiding trial on 10/28, voiding trial today     Follow-up with urology as scheduled

## 2022-11-04 NOTE — ASSESSMENT & PLAN NOTE
PRE OPERATIVE CARDIAC RISK ASSESSMENT    Date of Surgery: 11/15/2022    Type of Surgery: Left ureteroscopy and left stent placement     Surgeon: Calixto Angel MD      Anticoagulation: no  No elevated risk surgery   No recent MI , no symptoms of heart failure exacerbation , no recent CVA    No h/o DM   Creat stable 0 7 today   The risk of major cardiac event is low-moderate

## 2022-11-04 NOTE — PROGRESS NOTES
[address] [place] [address] Drive: [facility]  POS: 31: SNF/Short Term Rehab    NAME: Sheri Brothmitchell  AGE: 80 y o  SEX: male  DATE OF ADMISSION: 10/21 DATE OF DISCHARGE:11/06 DISCHARGE DISPOSITION: home   Today's Visit: 11/4/20222:43 PM    Reason for admission: Patient was admitted from [facility] for rehabilitation after hospitalization for urinary retention, fall  History of Present Illness:                 "42-year-old male with a past medical history significant for hypothyroidism, hyperlipidemia, GERD, hypertension presented to the hospital on 10/18 due to a fall at [facility]  Patient was noted to have acute urinary retention, on further workup imaging suggestive of a distal stone on the left with hydronephrosis  He had a recent hip fracture that was repaired at [facility] with associated compression fractures  During his hospital stay he was continued on supportive care, pain management with low-dose oxycodone  Patient was evaluated by Urology and underwent cystoscopy with ureteral stent insertion on the left and White catheter was placed  His KARIE resolved he was treated with 3 days of IV antibiotics  Patient was evaluated by PT and OT they recommended post acute rehab services  Patient was seen this morning, his very pleasant and cooperative he does not have any acute complaint  He denies any pain   "                  Course of stay: Patient was admitted to  [address] [place] [address] Unit for rehabilitation due to  physical deconditioning and ambulatory dysfunction  Significant events during the stay include hyponatremia  The patient participated in PT/OT  He was using TLSO brace as recommended  Kidney function and electrolytes closely monitored , Na improved with improvement of pain and oral intake  BP monitored, noted to be low, losartan was discontinued  To follow up with Urology for ureteroscopy and stent placement  Assessment/Plan:    Essential (primary) hypertension  BP in the lower side  Discontinue losartan and monitor closely     Preoperative clearance    PRE OPERATIVE CARDIAC RISK ASSESSMENT    Date of Surgery: 11/15/2022    Type of Surgery: Left ureteroscopy and left stent placement     Surgeon: Audrey Thrasher MD      Anticoagulation: no  No elevated risk surgery   No recent MI , no symptoms of heart failure exacerbation , no recent CVA  No h/o DM   Creat stable 0 7 today   The risk of major cardiac event is low-moderate       Gastro-esophageal reflux disease without esophagitis  Continue Prilosec 20 mg daily    Drug-induced constipation  Patient on low dose oxycodone for hip pain   Bowel regimen: senokot 2 tabs daily, miralax 17gm daily   Increase fiber in diet, adequate hydration   Out of bed and ambulate as able  Hypothyroidism  Noted mildly elevated TSH of 7 7  Continue levothyroxine 125 mcg daily  Recheck TSH in 4 weeks , adjust dose as needed     Cognitive decline  MOCA 10/25/22-18/30   TSH 7 7, B12 269 in October, recheck and f/u with PCP    Continue B12 supplementation          Hyponatremia  Improved to 134 today      History of fracture of right hip  Patient had comminuted subcapital femur fracture of right hip 10/8/22, s/p operative fixation       Hydronephrosis with urinary obstruction due to renal calculus  Patient presented after fall, CT of the abdomen and pelvis noting 1 cm obstructing left ureteral calculus in the pelvic portion of the left ureter with mild left hydronephrosis  Patient is status post cystoscopy with left ureteral stent placement on 10/18  Patient to maintain White catheter for 10-14 days with outpatient voiding trial  Failed voiding trial on 10/28, voiding trial today  Follow-up with urology as scheduled    Lumbar compression fracture (HCC)  Chronic L3 and L4 compression fractures, stable on CT of the lumbar spine  Continue p r n   Tylenol and low-dose oxycodone for pain  Continue with TLSO brace  Follow-up with Neurosurgery as scheduled         Discharge Medications: See discharge medication list which was reviewed and signed  Status at time of discharge: Stable    Subjective:  Patient complaining of back pain which is chronic for the last 2 years , he is following with pain management  Voiding trial today     Review of Systems   Constitutional: Negative for appetite change and fever  HENT: Negative for trouble swallowing  Eyes: Negative for discharge and itching  Gastrointestinal: Negative for abdominal pain, blood in stool, constipation, diarrhea and vomiting  Genitourinary: Positive for difficulty urinating  Musculoskeletal: Positive for arthralgias and back pain  Neurological: Negative for speech difficulty  Psychiatric/Behavioral: Negative for confusion and dysphoric mood  Vital Signs:     Blood pressure 118/68 Heart Rate: 61 Respiratory Rate 19   Temperature 98 1 Oxygen Saturation 96 Weight 154 6    Exam:     Physical Exam  Vitals reviewed  HENT:      Head: Normocephalic and atraumatic  Nose: Nose normal       Mouth/Throat:      Mouth: Mucous membranes are moist    Eyes:      General: No scleral icterus  Cardiovascular:      Rate and Rhythm: Normal rate  Heart sounds: Normal heart sounds  Abdominal:      General: There is no distension  Palpations: Abdomen is soft  Tenderness: There is no abdominal tenderness  Musculoskeletal:      Right lower leg: No edema  Left lower leg: No edema  Skin:     General: Skin is warm  Neurological:      Mental Status: He is alert  Mental status is at baseline     Psychiatric:         Mood and Affect: Mood normal          Discussion with patient/family and further instructions:  -Fall precautions  -Aspiration precautions  -Bleeding precautions  -Monitor for signs/symptoms of infection  -Medication list was reviewed and signed  -DME form was completed    Follow-up Recommendations: Please follow-up with your primary care physician within 7-10 days of discharge to review medication changes and current status       Problem List Follow-up Recommendations:    Warren Mcgraw PGY 4   Geriatric Medicine  11/4/20222:43 PM

## 2022-11-04 NOTE — TELEPHONE ENCOUNTER
Called and spoke with [facility]  Patient scheduled for surgery 11/15/2022 at the [facility] with Dr Alecia Loera  Went over instructions for nurse as well as faxed to [phone]  Patient will have urine culture obtained today

## 2022-11-07 DIAGNOSIS — R33.9 URINARY RETENTION: Primary | ICD-10-CM

## 2022-11-07 DIAGNOSIS — R82.71 BACTERIURIA: ICD-10-CM

## 2022-11-07 RX ORDER — TAMSULOSIN HYDROCHLORIDE 0.4 MG/1
0.4 CAPSULE ORAL
Qty: 30 CAPSULE | Refills: 0 | Status: ON HOLD | OUTPATIENT
Start: 2022-11-07 | End: 2023-02-21 | Stop reason: CLARIF

## 2022-11-07 RX ORDER — CIPROFLOXACIN 500 MG/1
500 TABLET, FILM COATED ORAL EVERY 12 HOURS SCHEDULED
Qty: 14 TABLET | Refills: 0 | Status: SHIPPED | OUTPATIENT
Start: 2022-11-07 | End: 2022-11-14

## 2022-11-07 NOTE — H&P
H&P Exam - Urology   Marycarmen Fernandes 80 y o  male MRN: [id number]  Unit/Bed#:  Encounter: [id number]    Assessment/Plan   At approximally 2:00 p m  on 11/07/2022 I spoke to the patient's daughter in detail about the proposed procedure as well as the patient's acute renal failure which has resolved and his issues with urinary retention  She is agreeable with the plan for surgery as proposed understanding potential risks and potential complications  She understands that the patient may also have a problem with urinary retention and if this recurs or if renal function decrease his once again after White catheter removal the patient may require further intervention I he TURP at a later date  She provided verbal consent for the procedure proposed as well as the plan after surgery  Assessment:  1 cm left distal ureteral stone  left renal stone  Urinary retention  Plan:  Cystoscopy left retrograde pyelogram left ureteroscopic laser lithotripsy and stent insertion    History of Present Illness   HPI:  Marycarmen Fernandes is a 80 y o  male who presented with urinary retention and a 1 cm left distal ureteral calculus causing left hydronephrosis causing change in mental status  The patient underwent placement of a left ureteral stent on 10/18/2022 with his overall medical condition improving  The patient presented with a serum creatinine of 2 21 which rapidly normalized with placement of a White catheter and ureteral stent  The patient now presents for cystoscopy left retrograde pyelogram left ureteroscopic holmium laser lithotripsy and stent reinsertion    Plan will be to place a White catheter per urethra for bladder drainage and plan voiding trial at time of stent removal   Close attention to postvoid urinary residuals as well as serum creatinine levels will be made in this elderly gentleman after White catheter removal     I saw the patient in the same-day surgical suite and discussed the proposed procedure with him and his daughter describing the procedure step-by-step answering all patient questions and all of his daughter's questions  They agreed to proceed with the procedure as proposed understanding risks of contracture perforation renal failure need for an additional stent potential need for White catheter potential need for operative intervention for urinary retention potential for retained stone or stone fragments bleeding and infection  They agreed to the procedure and provided both verbal and signed informed consent  Review of Systems    Historical Information   Past Medical History:   Diagnosis Date   • BPH (benign prostatic hyperplasia)    • Hyperlipidemia    • Hypertension    • Hypothyroidism    • Peripheral vascular disease ([address] )      Past Surgical History:   Procedure Laterality Date   • FL RETROGRADE PYELOGRAM  10/18/2022   • JOINT REPLACEMENT Right     hip   • IN CYSTOURETHROSCOPY,URETER CATHETER Left 10/18/2022    Procedure: CYSTOSCOPY RETROGRADE PYELOGRAM WITH INTERPRETATION AND WITH INSERTION STENT URETERAL;  Surgeon: Rosalba Courtney MD;  Location: [facility];  Service: Urology     Social History   Social History     Substance and Sexual Activity   Alcohol Use Not on file     Social History     Substance and Sexual Activity   Drug Use Not on file     Social History     Tobacco Use   Smoking Status Former Smoker   • Types: Cigars, Pipe   Smokeless Tobacco Never Used     Family History: No family history on file  Meds/Allergies   all medications and allergies reviewed  No Known Allergies    Objective   Vitals: There were no vitals taken for this visit  No intake/output data recorded  Invasive Devices  Report    Peripheral Intravenous Line  Duration           Peripheral IV 10/18/22 Right Antecubital 20 days          Drain  Duration           Urethral Catheter Latex 18 Fr  19 days                Physical Exam  Vitals reviewed  Constitutional:       General: He is not in acute distress  Appearance: Normal appearance  He is not ill-appearing, toxic-appearing or diaphoretic  HENT:      Head: Normocephalic and atraumatic  Nose: Nose normal       Mouth/Throat:      Mouth: Mucous membranes are moist    Eyes:      Extraocular Movements: Extraocular movements intact  Cardiovascular:      Rate and Rhythm: Normal rate  Pulmonary:      Effort: Pulmonary effort is normal  No respiratory distress  Abdominal:      Palpations: Abdomen is soft  Skin:     General: Skin is dry  Psychiatric:         Behavior: Behavior normal          Lab Results: I have personally reviewed pertinent reports  Imaging: I have personally reviewed pertinent reports  and I have personally reviewed pertinent films in PACS  EKG, Pathology, and Other Studies: I have personally reviewed pertinent reports  and I have personally reviewed pertinent films in PACS  VTE Prophylaxis: Sequential compression device (Venodyne)  and Heparin    Code Status: Prior  Advance Directive and Living Will:      Power of :    POLST:      Counseling / Coordination of Care  Total floor / unit time spent today 30 minutes  Greater than 50% of total time was spent with the patient and / or family counseling and / or coordination of care  A description of the counseling / coordination of care: Noemy Diallo

## 2022-11-07 NOTE — TELEPHONE ENCOUNTER
Called and spoke with Bandar Soria questioning time of procedure, informed her that hospital will call day before with arrival time

## 2022-11-07 NOTE — DISCHARGE INSTRUCTIONS
White Catheter Placement and Care   WHAT YOU NEED TO KNOW:   A White catheter is a sterile tube that is inserted into your bladder to drain urine  It is also called an indwelling urinary catheter  DISCHARGE INSTRUCTIONS:   Seek care immediately if:   Your catheter comes out  You suddenly have material that looks like sand in the tubing or drainage bag  No urine is draining into the bag and you have checked the system  You have pain in your hip, back, pelvis, or lower abdomen  You are confused or cannot think clearly  Call your doctor or urologist if:   You have a fever  You have bladder spasms for more than 1 day after the catheter is placed  You see blood in the tubing or drainage bag  You have a rash or itching where the catheter tube is secured to your skin  Urine leaks from or around the catheter, tubing, or drainage bag  The closed drainage system has accidently come open or apart  You see a layer of crystals inside the tubing  You have questions or concerns about your condition or care  Care for your catheter and drainage bag: You can reduce your risk for infection and injury by caring for your catheter and drainage bag properly  Wash your hands often  Wash before and after you touch your catheter, tubing, or drainage bag  Use soap and water  Wear clean disposable gloves when you care for your catheter or disconnect the drainage bag  Wash your hands before you prepare or eat food  Clean your genital area 2 times every day  Clean your catheter area and anal opening after every bowel movement  For men:  Use a soapy cloth to clean the tip of your penis  Start where the catheter enters  Wipe backward making sure to pull back the foreskin  Then use a cloth with clear water in the same direction to clean away the soap  For women:  Use a soapy cloth to clean the area that the catheter enters your body   Make sure to separate your labia and wipe toward the anus  Then use a cloth with clear water and wipe in the same direction  Secure the catheter tube  so you do not pull or move the catheter  This helps prevent pain and bladder spasms  Healthcare providers will show you how to use medical tape or a strap to secure the catheter tube to your body  Keep a closed drainage system  Your catheter should always be attached to the drainage bag to form a closed system  Do not disconnect any part of the closed system unless you need to change the bag  Keep the drainage bag below the level of your waist   This helps stop urine from moving back up the tubing and into your bladder  Do not loop or kink the tubing  This can cause urine to back up and collect in your bladder  Do not let the drainage bag touch or lie on the floor  Empty the drainage bag when needed  The weight of a full drainage bag can be painful  Empty the drainage bag every 3 to 6 hours or when it is ? full  Clean and change the drainage bag as directed  Ask your healthcare provider how often you should change the drainage bag and what cleaning solution to use  Wear disposable gloves when you change the bag  Do not allow the end of the catheter or tubing to touch anything  Clean the ends with an alcohol pad before you reconnect them  What to do if problems develop:   No urine is draining into the bag:      Check for kinks in the tubing and straighten them out  Check the tape or strap used to secure the catheter tube to your skin  Make sure it is not blocking the tube  Make sure you are not sitting or lying on the tubing  Make sure the urine bag is hanging below the level of your waist     Urine leaks from or around the catheter, tubing, or drainage bag:  Check if the closed drainage system has accidently come open or apart  Clean the catheter and tubing ends with a new alcohol pad and reconnect them      Follow up with your doctor or urologist as directed:  Write down your questions so you remember to ask them during your visits  © Copyright [employer] 2022 Information is for End User's use only and may not be sold, redistributed or otherwise used for commercial purposes  All illustrations and images included in CareNotes® are the copyrighted property of [employer]  or Brittany Sanchez  The above information is an  only  It is not intended as medical advice for individual conditions or treatments  Talk to your doctor, nurse or pharmacist before following any medical regimen to see if it is safe and effective for you  Ureteral Stent Placement   WHAT YOU NEED TO KNOW:   Ureteral stent placement is a procedure to open a blocked or narrow ureter  The ureter is the tube that carries urine from your kidney into your bladder  A stent is a thin hollow plastic tube used to hold your ureter open and allow urine to flow  The stent may stay in for several weeks  Long-term stents will stay in longer and need to be replaced within a certain period of time  DISCHARGE INSTRUCTIONS:   Seek care immediately if:   You urinate very little or not at all  You have severe pain in your abdomen, even after you take medicine  You have heavy bleeding from your urethra  You see large blood clots in your urine, or your urine is bright red  Contact your healthcare provider or urologist if:   You have a fever or chills  You feel like you need to urinate very often  Your symptoms get worse, or you develop new symptoms  You have questions or concerns about your condition or care  Medicines: You may  need any of the following:  Acetaminophen  decreases pain and fever  It is available without a doctor's order  Ask how much to take and how often to take it  Follow directions  Read the labels of all other medicines you are using to see if they also contain acetaminophen, or ask your doctor or pharmacist  Acetaminophen can cause liver damage if not taken correctly   Do not use more than 4 grams (4,000 milligrams) total of acetaminophen in one day  Prescription pain medicine  may be given  Ask your healthcare provider how to take this medicine safely  Some prescription pain medicines contain acetaminophen  Do not take other medicines that contain acetaminophen without talking to your healthcare provider  Too much acetaminophen may cause liver damage  Prescription pain medicine may cause constipation  Ask your healthcare provider how to prevent or treat constipation  Antibiotics  help prevent or treat bacterial infections  Your healthcare provider may prescribe these for you while you have a ureteral stent  Take your medicine as directed  Contact your healthcare provider if you think your medicine is not helping or if you have side effects  Tell him or her if you are allergic to any medicine  Keep a list of the medicines, vitamins, and herbs you take  Include the amounts, and when and why you take them  Bring the list or the pill bottles to follow-up visits  Carry your medicine list with you in case of an emergency  Self-care:   Drink liquids as directed  Liquids will help flush your urinary tract and prevent infection  Ask your healthcare provider how much liquid to drink each day and which liquids are best for you  Ask when you can return to daily activities  You may be able to return to normal activities the day after your stent placement  Follow up with your urologist as directed: You will need regular follow-up visits with your urologist as long as you have a stent  He or she will check to make sure the stent is working properly  He or she will remove your temporary stent in several weeks  Your provider may do urine cultures to check for infection  Write down your questions so you remember to ask them during your visits    © Copyright [employer] 2022 Information is for End User's use only and may not be sold, redistributed or otherwise used for commercial purposes  All illustrations and images included in CareNotes® are the copyrighted property of [employer]  or Brittany Sanchez  The above information is an  only  It is not intended as medical advice for individual conditions or treatments  Talk to your doctor, nurse or pharmacist before following any medical regimen to see if it is safe and effective for you

## 2022-11-10 ENCOUNTER — ANESTHESIA EVENT (OUTPATIENT)
Dept: PERIOP | Facility: HOSPITAL | Age: 87
End: 2022-11-10

## 2022-11-14 RX ORDER — POLYETHYLENE GLYCOL 3350 17 G/17G
17 POWDER, FOR SOLUTION ORAL DAILY
COMMUNITY

## 2022-11-14 NOTE — PRE-PROCEDURE INSTRUCTIONS
Pre-Surgery Instructions:   Medication Instructions   • acetaminophen (TYLENOL) 325 mg tablet Uses PRN- OK to take day of surgery   • aspirin (ECOTRIN LOW STRENGTH) 81 mg EC tablet Stop taking 7 days prior to surgery  • ciprofloxacin (CIPRO) 500 mg tablet Hold day of surgery  • gabapentin (Neurontin) 100 mg capsule Take night before surgery   • levothyroxine 125 mcg tablet Take day of surgery  • lidocaine (Lidoderm) 5 % Hold day of surgery  • omeprazole (PriLOSEC) 20 mg delayed release capsule Take day of surgery  • polyethylene glycol (MIRALAX) 17 g packet Stop taking 1 day prior to surgery  • senna-docusate sodium (SENOKOT S) 8 6-50 mg per tablet Take night before surgery   • simvastatin (ZOCOR) 40 mg tablet Take night before surgery   • tamsulosin (FLOMAX) 0 4 mg Take night before surgery   • vitamin B-12 (VITAMIN B-12) 1,000 mcg tablet Stop taking 7 days prior to surgery  Preop Instructions per My Surgical Experience booklet,medications per anesthesia guidelines and showering instructions per Yu Salazar protocol using an antibacterial soap reviewed with daughter  Daughter verbalized understanding of current visitor restrictions  Instructed to call surgeon with any illness,change in health or covid exposure now till DOS  All medications instructed to take DOS are with sips water only  Instructed to avoid all ASA and OTC Vit/Supp 1 week prior to surgery and to avoid NSAIDs 3 days prior to surgery per anesthesia instructions  Tylenol ok to take prn  Daughter verbalized an understanding of all instructions reviewed and offers no concerns at this time

## 2022-11-14 NOTE — ANESTHESIA PREPROCEDURE EVALUATION
Procedure:  CYSTOSCOPY URETEROSCOPY WITH LITHOTRIPSY HOLMIUM LASER, RETROGRADE PYELOGRAM AND INSERTION STENT URETERAL (Left Bladder)  EXCHANGE STENT URETERAL (Left Bladder)    Relevant Problems   CARDIO   (+) Essential (primary) hypertension   (+) Hyperlipidemia      ENDO   (+) Hypothyroidism      GI/HEPATIC   (+) Gastro-esophageal reflux disease without esophagitis      /RENAL   (+) Hydronephrosis with urinary obstruction due to renal calculus      NEURO/PSYCH   (+) History of fracture of right hip   (+) Status post fall        Physical Exam    Airway    Mallampati score: II  TM Distance: >3 FB  Neck ROM: full     Dental       Cardiovascular  Rhythm: regular, Rate: normal, Cardiovascular exam normal    Pulmonary  Pulmonary exam normal Breath sounds clear to auscultation,     Other Findings  Multiple missing teeth      Anesthesia Plan  ASA Score- 2     Anesthesia Type- general with ASA Monitors  Additional Monitors:   Airway Plan: LMA  Comment: Risks/benefits and alternatives discussed with patient including likely possibility of PONV and sore throat, as well as the rare possibilities of aspiration, dental/oropharyngeal/ocular injuries, or grave/life threatening anesthetic and surgical emergencies          Plan Factors-Exercise tolerance (METS): >4 METS  Patient summary reviewed  Patient instructed to abstain from smoking on day of procedure  Patient did not smoke on day of surgery  Induction- intravenous  Postoperative Plan- Plan for postoperative opioid use  Planned trial extubation    Informed Consent- Anesthetic plan and risks discussed with patient  I personally reviewed this patient with the CRNA  Discussed and agreed on the Anesthesia Plan with the CRNA  Tyron Stoll

## 2022-11-15 ENCOUNTER — HOSPITAL ENCOUNTER (OUTPATIENT)
Facility: HOSPITAL | Age: 87
Setting detail: OUTPATIENT SURGERY
Discharge: HOME | End: 2022-11-15
Attending: UROLOGY | Admitting: UROLOGY

## 2022-11-15 ENCOUNTER — APPOINTMENT (OUTPATIENT)
Dept: RADIOLOGY | Facility: HOSPITAL | Age: 87
End: 2022-11-15

## 2022-11-15 ENCOUNTER — ANESTHESIA (OUTPATIENT)
Dept: PERIOP | Facility: HOSPITAL | Age: 87
End: 2022-11-15

## 2022-11-15 VITALS
OXYGEN SATURATION: 99 % | SYSTOLIC BLOOD PRESSURE: 133 MMHG | DIASTOLIC BLOOD PRESSURE: 69 MMHG | TEMPERATURE: 96.6 F | RESPIRATION RATE: 18 BRPM | WEIGHT: 194 LBS | HEART RATE: 77 BPM | HEIGHT: 72 IN | BODY MASS INDEX: 26.28 KG/M2

## 2022-11-15 DIAGNOSIS — N20.1 LEFT URETERAL STONE: Primary | ICD-10-CM

## 2022-11-15 DIAGNOSIS — Z46.6 ENCOUNTER FOR ADJUSTMENT OF URETERAL STENT: ICD-10-CM

## 2022-11-15 DEVICE — VARIABLE LENGTH INJECTION STENT SET
Type: IMPLANTABLE DEVICE | Status: FUNCTIONAL
Brand: CONTOUR VL™ INJECTION STENT SET

## 2022-11-15 RX ORDER — DEXAMETHASONE SODIUM PHOSPHATE 10 MG/ML
INJECTION, SOLUTION INTRAMUSCULAR; INTRAVENOUS AS NEEDED
Status: DISCONTINUED | OUTPATIENT
Start: 2022-11-15 | End: 2022-11-15

## 2022-11-15 RX ORDER — OXYCODONE HYDROCHLORIDE AND ACETAMINOPHEN 5; 325 MG/1; MG/1
1 TABLET ORAL EVERY 8 HOURS PRN
Qty: 12 TABLET | Refills: 0 | Status: SHIPPED | OUTPATIENT
Start: 2022-11-15 | End: 2022-11-25

## 2022-11-15 RX ORDER — SODIUM CHLORIDE, SODIUM LACTATE, POTASSIUM CHLORIDE, CALCIUM CHLORIDE 600; 310; 30; 20 MG/100ML; MG/100ML; MG/100ML; MG/100ML
125 INJECTION, SOLUTION INTRAVENOUS CONTINUOUS
Status: DISCONTINUED | OUTPATIENT
Start: 2022-11-15 | End: 2022-11-15 | Stop reason: HOSPADM

## 2022-11-15 RX ORDER — HYDROMORPHONE HCL IN WATER/PF 6 MG/30 ML
0.2 PATIENT CONTROLLED ANALGESIA SYRINGE INTRAVENOUS
Status: DISCONTINUED | OUTPATIENT
Start: 2022-11-15 | End: 2022-11-15 | Stop reason: HOSPADM

## 2022-11-15 RX ORDER — EPHEDRINE SULFATE 50 MG/ML
INJECTION INTRAVENOUS AS NEEDED
Status: DISCONTINUED | OUTPATIENT
Start: 2022-11-15 | End: 2022-11-15

## 2022-11-15 RX ORDER — ONDANSETRON 2 MG/ML
4 INJECTION INTRAMUSCULAR; INTRAVENOUS ONCE AS NEEDED
Status: DISCONTINUED | OUTPATIENT
Start: 2022-11-15 | End: 2022-11-15 | Stop reason: HOSPADM

## 2022-11-15 RX ORDER — HEPARIN SODIUM 5000 [USP'U]/ML
5000 INJECTION, SOLUTION INTRAVENOUS; SUBCUTANEOUS ONCE
Status: COMPLETED | OUTPATIENT
Start: 2022-11-15 | End: 2022-11-15

## 2022-11-15 RX ORDER — MIDAZOLAM HYDROCHLORIDE 2 MG/2ML
INJECTION, SOLUTION INTRAMUSCULAR; INTRAVENOUS AS NEEDED
Status: DISCONTINUED | OUTPATIENT
Start: 2022-11-15 | End: 2022-11-15

## 2022-11-15 RX ORDER — PROPOFOL 10 MG/ML
INJECTION, EMULSION INTRAVENOUS AS NEEDED
Status: DISCONTINUED | OUTPATIENT
Start: 2022-11-15 | End: 2022-11-15

## 2022-11-15 RX ORDER — ONDANSETRON 2 MG/ML
INJECTION INTRAMUSCULAR; INTRAVENOUS AS NEEDED
Status: DISCONTINUED | OUTPATIENT
Start: 2022-11-15 | End: 2022-11-15

## 2022-11-15 RX ORDER — FENTANYL CITRATE/PF 50 MCG/ML
25 SYRINGE (ML) INJECTION
Status: DISCONTINUED | OUTPATIENT
Start: 2022-11-15 | End: 2022-11-15 | Stop reason: HOSPADM

## 2022-11-15 RX ORDER — CIPROFLOXACIN 500 MG/1
500 TABLET, FILM COATED ORAL DAILY
Qty: 7 TABLET | Refills: 0 | Status: SHIPPED | OUTPATIENT
Start: 2022-11-15 | End: 2022-11-22

## 2022-11-15 RX ORDER — MAGNESIUM HYDROXIDE 1200 MG/15ML
LIQUID ORAL AS NEEDED
Status: DISCONTINUED | OUTPATIENT
Start: 2022-11-15 | End: 2022-11-15 | Stop reason: HOSPADM

## 2022-11-15 RX ORDER — FENTANYL CITRATE 50 UG/ML
INJECTION, SOLUTION INTRAMUSCULAR; INTRAVENOUS AS NEEDED
Status: DISCONTINUED | OUTPATIENT
Start: 2022-11-15 | End: 2022-11-15

## 2022-11-15 RX ORDER — KETOROLAC TROMETHAMINE 30 MG/ML
INJECTION, SOLUTION INTRAMUSCULAR; INTRAVENOUS AS NEEDED
Status: DISCONTINUED | OUTPATIENT
Start: 2022-11-15 | End: 2022-11-15

## 2022-11-15 RX ORDER — HYDROCODONE BITARTRATE AND ACETAMINOPHEN 5; 325 MG/1; MG/1
1 TABLET ORAL EVERY 6 HOURS PRN
Status: DISCONTINUED | OUTPATIENT
Start: 2022-11-15 | End: 2022-11-15 | Stop reason: HOSPADM

## 2022-11-15 RX ORDER — LIDOCAINE HYDROCHLORIDE 10 MG/ML
0.5 INJECTION, SOLUTION EPIDURAL; INFILTRATION; INTRACAUDAL; PERINEURAL ONCE AS NEEDED
Status: DISCONTINUED | OUTPATIENT
Start: 2022-11-15 | End: 2022-11-15 | Stop reason: HOSPADM

## 2022-11-15 RX ADMIN — HEPARIN SODIUM 5000 UNITS: 5000 INJECTION INTRAVENOUS; SUBCUTANEOUS at 08:00

## 2022-11-15 RX ADMIN — FENTANYL CITRATE 25 MCG: 50 INJECTION, SOLUTION INTRAMUSCULAR; INTRAVENOUS at 10:14

## 2022-11-15 RX ADMIN — FENTANYL CITRATE 25 MCG: 50 INJECTION, SOLUTION INTRAMUSCULAR; INTRAVENOUS at 10:25

## 2022-11-15 RX ADMIN — MIDAZOLAM 2 MG: 1 INJECTION INTRAMUSCULAR; INTRAVENOUS at 09:26

## 2022-11-15 RX ADMIN — FENTANYL CITRATE 25 MCG: 50 INJECTION INTRAMUSCULAR; INTRAVENOUS at 11:00

## 2022-11-15 RX ADMIN — FENTANYL CITRATE 25 MCG: 50 INJECTION, SOLUTION INTRAMUSCULAR; INTRAVENOUS at 09:38

## 2022-11-15 RX ADMIN — CEFTAZIDIME 2000 MG: 1 INJECTION, POWDER, FOR SOLUTION INTRAMUSCULAR; INTRAVENOUS at 09:27

## 2022-11-15 RX ADMIN — FENTANYL CITRATE 25 MCG: 50 INJECTION, SOLUTION INTRAMUSCULAR; INTRAVENOUS at 10:05

## 2022-11-15 RX ADMIN — DEXAMETHASONE SODIUM PHOSPHATE 10 MG: 10 INJECTION, SOLUTION INTRAMUSCULAR; INTRAVENOUS at 09:38

## 2022-11-15 RX ADMIN — EPHEDRINE SULFATE 10 MG: 50 INJECTION INTRAVENOUS at 09:46

## 2022-11-15 RX ADMIN — ONDANSETRON 4 MG: 2 INJECTION INTRAMUSCULAR; INTRAVENOUS at 09:38

## 2022-11-15 RX ADMIN — PROPOFOL 180 MG: 10 INJECTION, EMULSION INTRAVENOUS at 09:30

## 2022-11-15 RX ADMIN — HYDROCODONE BITARTRATE AND ACETAMINOPHEN 1 TABLET: 5; 325 TABLET ORAL at 11:47

## 2022-11-15 RX ADMIN — KETOROLAC TROMETHAMINE 10 MG: 30 INJECTION, SOLUTION INTRAMUSCULAR; INTRAVENOUS at 10:19

## 2022-11-15 RX ADMIN — SODIUM CHLORIDE, SODIUM LACTATE, POTASSIUM CHLORIDE, AND CALCIUM CHLORIDE: .6; .31; .03; .02 INJECTION, SOLUTION INTRAVENOUS at 09:26

## 2022-11-15 RX ADMIN — EPHEDRINE SULFATE 10 MG: 50 INJECTION INTRAVENOUS at 09:43

## 2022-11-15 NOTE — OP NOTE
OPERATIVE REPORT  PATIENT NAME: Gaurav Vidales    :  1934  MRN: [id number]  Pt Location:  OR ROOM 10    SURGERY DATE: 11/15/2022    Surgeon(s) and Role:     * Em Frias MD - Primary    Preop Diagnosis:  Left ureteral stone [N20 1]  Encounter for adjustment of ureteral stent [Z46 6]    Post-Op Diagnosis Codes:     * Left ureteral stone [N20 1]     * Encounter for adjustment of ureteral stent [Z46 6]    Procedure(s) (LRB):  CYSTOSCOPY URETEROSCOPY WITH LITHOTRIPSY HOLMIUM LASER, RETROGRADE PYELOGRAM AND INSERTION STENT URETERAL (Left)  EXCHANGE STENT URETERAL (Left)    Specimen(s):  ID Type Source Tests Collected by Time Destination   A : Left ureteral stone Calculus Ureter, Left STONE ANALYSIS Em Frias MD 11/15/2022 1007        Estimated Blood Loss:   Minimal    Drains:  Urethral Catheter Latex 18 Fr  (Active)   Number of days: 28       Urethral Catheter Latex 16 Fr  (Active)   Number of days: 0       Anesthesia Type:   General    Operative Indications:  Left ureteral stone [N20 1]  Encounter for adjustment of ureteral stent [Z46 6]       Operative Findings:  See operative note below    Complications:   None    Procedure and Technique: I saw the patient in holding area and reviewed the procedure step-by-step answered all questions and provided information about risks and complications as outlined on the history and physical which was also performed  The patient gave verbal and signed informed consent for the procedure was taken to the operating room placed in the dorsal lithotomy position on the operating table and general LMA anesthesia was induced after appropriate time-out  A 22 Italian cystoscope 30 and 70 degree lenses was placed per urethra into the urinary bladder  The urethra was within normal limits without stricture lesion  The prostatic urethra revealed bilobar enlargement the lateral lobes of the prostate with moderate visual occlusion of the bladder outlet    Urinary bladder was free of any intrinsic lesions but was moderately trabeculated without extrinsic mass compression  The right ureteral orifice was in normal position and configuration with clear efflux and the left ureteral orifice showed a stent exiting  The stent was grasped using grasping forceps and the cystoscope removed from the patient with the distal portion of the stent being brought to the urethral meatus  A 0 035 Gary-coated floppy tip guidewire was inserted up the stent under fluoroscopic control into the left renal pelvis and the stent was then removed  The wire was used as a safety wire  A semi rigid short Abby Flip ureteral scope was placed per urethra into the urinary bladder and negotiated into the left ureter and advanced proximally  A 1 cm large gold colored stone was encountered in the distal ureter  The holmium laser using a 365 micron fiber was used to dust the stone into sub mm fragments  A few fragments were basketed from the distal ureter and sent for specimen using a [employer] flat wire basket  At the end of the procedure the ureteral scope was placed from the UVJ up to the mid ureter and no additional stones were intrinsic lesions were identified  The ureteral scope was removed the wire was backloaded into the 25 Macedonian cystoscope which was placed per urethra into the urinary bladder and over the wire a 6 Macedonian stent was placed and internalized with 3 curls in the renal pelvis and 1 curl in the bladder with a trans urethral Dangler suture exiting the urethral meatus  The Dangler suture was taped to the phallus and a 12 Macedonian White catheter was placed per urethra into the urinary bladder and left to straight drainage with 10 cc in the White balloon  At the end of the procedure there were no complications the White left to straight drainage and the stent Dangler suture taped to the phallus    The patient will return to the office in 1 week for White catheter and stent removal   If the patient fails a voiding trial and catheter will be replaced and the patient will be considered for TURP  If he passes a voiding trial his PVRs and voiding pattern will be monitored thereafter     I was present for the entire procedure and I was present for all critical portions of the procedure    Patient Disposition:  PACU         SIGNATURE: Daphne Barahona MD  DATE: November 15, 2022  TIME: 10:24 AM

## 2022-11-15 NOTE — ANESTHESIA POSTPROCEDURE EVALUATION
Post-Op Assessment Note    CV Status:  Stable  Pain Score: 0    Pain management: adequate     Mental Status:  Alert and awake   Hydration Status:  Euvolemic   PONV Controlled:  Controlled   Airway Patency:  Patent   Two or more mitigation strategies used for obstructive sleep apnea   Post Op Vitals Reviewed: Yes      Staff: CRNA         No complications documented      BP 94/62 (11/15/22 1031)    Temp (!) 97 2 °F (36 2 °C) (11/15/22 1031)    Pulse 83 (11/15/22 1031)   Resp 20 (11/15/22 1031)    SpO2 100 % (11/15/22 1031)

## 2022-11-15 NOTE — INTERVAL H&P NOTE
H&P reviewed  After examining the patient I find no changes in the patients condition since the H&P had been written      Vitals:    11/15/22 0752   BP: 125/60   Pulse: 71   Resp: 18   Temp: (!) 96 1 °F (35 6 °C)   SpO2: 99%

## 2022-11-16 ENCOUNTER — TELEPHONE (OUTPATIENT)
Dept: UROLOGY | Facility: MEDICAL CENTER | Age: 87
End: 2022-11-16

## 2022-11-16 DIAGNOSIS — R33.9 URINARY RETENTION: Primary | ICD-10-CM

## 2022-11-16 NOTE — TELEPHONE ENCOUNTER
Post Op Note    Adama Jasso is a 80 y o  male s/p CYSTOSCOPY URETEROSCOPY WITH LITHOTRIPSY HOLMIUM LASER, RETROGRADE PYELOGRAM AND INSERTION STENT URETERAL (Left: Bladder)  EXCHANGE STENT URETERAL (Left: Bladder) on 11/15/22 with Dr Clint Parrish  How would you rate your pain on a scale from 1 to 10, 10 being the worst pain ever? 0  Have you had a fever? No  Have your bowel movements been regular? No Pt advised to take Miralax and stool softener with good hydration  Do you have any difficulty urinating? No  If the patient has an incision- do you have any redness around the incision or any drainage from the incision? No incision  If the patient has a drain- are you having any issues with the drain? No drain  If the patient has a kelly- are you comfortable caring for your kelly? Yes Is it draining urine? Yes  Do you have any other questions or concerns that I can address at this time?  No    Pt is scheduled for 11/22/22 for kelly removal and stent removal

## 2022-11-21 LAB
CALCIUM OXALATE DIHYDRATE MFR STONE IR: 30 %
COLOR STONE: NORMAL
COM MFR STONE: 70 %
COMMENT-STONE3: NORMAL
COMPOSITION: NORMAL
LABORATORY COMMENT REPORT: NORMAL
PHOTO: NORMAL
SIZE STONE: NORMAL MM
SPEC SOURCE SUBJ: NORMAL
STONE ANALYSIS-IMP: NORMAL
STONE ANALYSIS-IMP: NORMAL
WT STONE: 145 MG

## 2022-11-22 ENCOUNTER — PROCEDURE VISIT (OUTPATIENT)
Dept: UROLOGY | Facility: MEDICAL CENTER | Age: 87
End: 2022-11-22

## 2022-11-22 ENCOUNTER — TELEPHONE (OUTPATIENT)
Dept: UROLOGY | Facility: MEDICAL CENTER | Age: 87
End: 2022-11-22

## 2022-11-22 VITALS
HEIGHT: 72 IN | HEART RATE: 77 BPM | SYSTOLIC BLOOD PRESSURE: 126 MMHG | WEIGHT: 170 LBS | BODY MASS INDEX: 23.03 KG/M2 | DIASTOLIC BLOOD PRESSURE: 70 MMHG

## 2022-11-22 DIAGNOSIS — N13.2 HYDRONEPHROSIS WITH URINARY OBSTRUCTION DUE TO RENAL CALCULUS: Primary | ICD-10-CM

## 2022-11-22 LAB — POST-VOID RESIDUAL VOLUME, ML POC: 384 ML

## 2022-11-22 NOTE — TELEPHONE ENCOUNTER
Please confirm if ureteral stent on string was removed today as well  And yes TOV repeat in 1 week thanks!

## 2022-11-22 NOTE — PROGRESS NOTES
11/22/2022    June Howell  1934  [id number]    Diagnosis      Patient presents for kelly removal managed by Dr Marky Rivera  Return to office as scheduled    Procedure Kelly removal/voiding trial    Kelly catheter removed after deflation of an intact balloon  Patient tolerated well  Encouraged patient to hydrate well and return this afternoon for post void residual   he knows he may return early if uncomfortable and unable to urinate  Patient agrees to this plan  Patient returned this afternoon  Patient states unable to void  Patient voided 0 ml while in office  Bladder ultrasound performed and PVR measured 384ml  No results found for this or any previous visit (from the past 4 hour(s))          Vitals:    11/22/22 0831   BP: 126/70   BP Location: Left arm   Patient Position: Sitting   Cuff Size: Standard   Pulse: 77   Weight: 77 1 kg (170 lb)   Height: 6' (1 829 m)           Marie Conti RN

## 2022-11-22 NOTE — PROGRESS NOTES
11/22/2022  Gissell Villalobos is a 80 y o  male  [id number]    Diagnosis:      Patient presents for kelly placement managed by Dr Kaveh Fleming:  Return to office in one week for TOV  Patient instructed to call with any questions or concerns in the meantime  Orders Placed This Encounter   Procedures   • POCT Measure PVR        Vitals:    11/22/22 0831   BP: 126/70   BP Location: Left arm   Patient Position: Sitting   Cuff Size: Standard   Pulse: 77   Weight: 77 1 kg (170 lb)   Height: 6' (1 829 m)           Procedure:    Universal Protocol:  Consent: Verbal consent obtained  Risks and benefits: risks, benefits and alternatives were discussed  Consent given by: patient  Patient understanding: patient states understanding of the procedure being performed  Patient identity confirmed: verbally with patient      Bladder catheterization    Date/Time: 11/22/2022 3:22 PM  Performed by: Stephanie Eli RN  Authorized by: Adithya Lama MD     Consent:     Consent given by:  Patient  Universal protocol:     Procedure explained and questions answered to patient or proxy's satisfaction: yes      Patient identity confirmed:  Verbally with patient  Anesthesia (see MAR for exact dosages): Anesthesia method:  Topical application    Topical anesthetic:  Lidocaine gel  Procedure details:     Bladder irrigation: no      Catheter insertion:  Indwelling    Approach: natural orifice      Catheter type:  Coude, Kelly and latex    Catheter size:  16 Fr    Number of attempts:  1    Successful placement: yes      Urine characteristics:  Yellow and dark  Post-procedure details:     Patient tolerance of procedure: Tolerated well, no immediate complications  Comments:      Site was prepped and draped in aseptic technique  Site was cleansed with betadine and lidocaine gel was inserted  16 Fr coude catheter was inserted with urine return  Balloon was inflated with 10mL's of sterile water  Urine return was 350mL's   Catheter was attached to leg bag and stat lock placed on leg  Patient was provided a night bag to change if needed              Yessy Talbert RN

## 2022-11-22 NOTE — TELEPHONE ENCOUNTER
Patient came in today for TOV and failed  Came back later in the day unable to urinate with 384mL's in his bladder  White catheter was reinserted  What is the next step with patient? Should he return for another void trial in 1 week?     Will need to call patients daughter to schedule next appointment

## 2022-11-25 ENCOUNTER — NURSE TRIAGE (OUTPATIENT)
Dept: OTHER | Facility: OTHER | Age: 87
End: 2022-11-25

## 2022-11-25 NOTE — TELEPHONE ENCOUNTER
Patient called confused about if he should be taking Flomax? I asked him to talk to his daughter cause it looks like she spoke with someone here today and Flomax and Percocet were both prescribed to him  He said he would call her cause she normally takes care of all his medications

## 2022-11-25 NOTE — TELEPHONE ENCOUNTER
Patient's daughter Benita Tidwell called in to confirm they located the patient's prescription for tamsulosin and patient will start taking today  Do they need to push back OV scheduled for 11/29/22 for kelly removal and void trial  Please follow up with Benita Tidwell

## 2022-11-25 NOTE — TELEPHONE ENCOUNTER
I spoke with pt and he is taking the flomax at dinner time  TOV scheduled for 11/29/22  I addendum the note for stent removal  This was removed the same time the kelly was removed

## 2022-11-25 NOTE — TELEPHONE ENCOUNTER
Regarding: medication clarification  ----- Message from Stephen Solis sent at 11/25/2022 11:06 AM EST -----  "Pt  Is having a procedure on Tuesday and was asked, are you taking your medicine?   If he is suppose to take something he has nothing at the pharmacy   His daughter needs clarificationed"

## 2022-11-25 NOTE — TELEPHONE ENCOUNTER
Reason for Disposition  • [1] Caller requesting NON-URGENT health information AND [2] PCP's office is the best resource    Answer Assessment - Initial Assessment Questions  1   REASON FOR CALL or QUESTION: "What is your reason for calling today?" or "How can I best help you?" or "What question do you have that I can help answer?"      "My father is having a procedure on Tuesday, is there any medication he should be taking prior to the procedure?"    Protocols used: INFORMATION ONLY CALL - NO TRIAGE-[facility]

## 2022-11-25 NOTE — TELEPHONE ENCOUNTER
I spoke with patients daughter she is not sure now if he is taking the flomax  She is going to call the pharmacy and let us know  Keeping the TOV appt until I hear back from the daughter

## 2022-11-25 NOTE — TELEPHONE ENCOUNTER
Looks like patient is scheduled for stent removal and void trial   No medications needed before hand

## 2022-11-25 NOTE — TELEPHONE ENCOUNTER
Tamsuloisn on med list please confirm with pt that he is on Tamsulosin   If he needs a new script sent in , let me know

## 2022-11-25 NOTE — TELEPHONE ENCOUNTER
Patient called back and he was confused and wanted to know if tamsolusin was the same as flomax  I told him yes  He wanted to make sure he is taking the right medication  He stated thank you for confirming it

## 2022-11-25 NOTE — TELEPHONE ENCOUNTER
Patient scheduled for a urology procedure on 11/29  Patient's daughter calling to go over medication and clarify if there is anything he should be taking prior to the procedure  Please follow up

## 2022-11-25 NOTE — PROGRESS NOTES
11/22/2022  Greg Shipley is a 80 y o  male  [id number]        Diagnosis      Patient is s/p left ureteroscopy with stone extraction on 11/15/22 with Dr Carloz Woods  Return to office for TOV in 1 week      Procedure Stent with String Removal    Vitals:    11/22/22 0831   BP: 126/70   BP Location: Left arm   Patient Position: Sitting   Cuff Size: Standard   Pulse: 77   Weight: 77 1 kg (170 lb)   Height: 6' (1 829 m)       Stent with string removed intact without difficulty  Reviewed post stent removal symptoms including flank pain, dysuria, and hematuria  Instructed patient to increase oral fluid intake  Encouraged the use of NSAIDS and other prescribed pain medication as needed for discomfort  Patient instructed to call the office or report to the ER for uncontrolled pain, fever, chills, nausea or vomiting         Pamela Akhtar RN

## 2022-12-01 RX ORDER — TORSEMIDE 10 MG/1
1 TABLET ORAL DAILY
COMMUNITY
Start: 2022-12-01

## 2022-12-02 ENCOUNTER — TELEPHONE (OUTPATIENT)
Dept: UROLOGY | Facility: MEDICAL CENTER | Age: 87
End: 2022-12-02

## 2022-12-02 ENCOUNTER — PROCEDURE VISIT (OUTPATIENT)
Dept: UROLOGY | Facility: MEDICAL CENTER | Age: 87
End: 2022-12-02

## 2022-12-02 VITALS
DIASTOLIC BLOOD PRESSURE: 76 MMHG | BODY MASS INDEX: 22.89 KG/M2 | SYSTOLIC BLOOD PRESSURE: 138 MMHG | HEIGHT: 72 IN | WEIGHT: 169 LBS

## 2022-12-02 DIAGNOSIS — R33.9 URINARY RETENTION: Primary | ICD-10-CM

## 2022-12-02 LAB — POST-VOID RESIDUAL VOLUME, ML POC: 383 ML

## 2022-12-02 NOTE — PROGRESS NOTES
12/2/2022  Simi Pimentel is a 80 y o  male  [id number]    Diagnosis:  Chief Complaint    Urinary Retention         Patient presents for kelly placement managed by Dr Vineet Santillan:  Return to office for follow up with provider  Patient instructed to call with any questions or concerns in the meantime  Orders Placed This Encounter   Procedures   • POCT Measure PVR        Vitals:    12/02/22 0902   BP: 138/76   BP Location: Left arm   Patient Position: Sitting   Cuff Size: Standard   Weight: 76 7 kg (169 lb)   Height: 6' (1 829 m)           Procedure:    Universal Protocol:  Consent: Verbal consent obtained  Risks and benefits: risks, benefits and alternatives were discussed  Consent given by: patient  Patient understanding: patient states understanding of the procedure being performed  Patient identity confirmed: verbally with patient      Bladder catheterization    Date/Time: 12/2/2022 3:00 PM  Performed by: Gely Mcfadden RN  Authorized by: Que Dow MD     Consent:     Consent given by:  Patient  Universal protocol:     Procedure explained and questions answered to patient or proxy's satisfaction: yes      Patient identity confirmed:  Verbally with patient  Anesthesia (see MAR for exact dosages): Anesthesia method:  Topical application    Topical anesthetic:  Lidocaine gel  Procedure details:     Bladder irrigation: no      Catheter insertion:  Indwelling    Approach: natural orifice      Catheter type:  Coude, Kelly and latex    Catheter size:  16 Fr    Number of attempts:  1    Successful placement: yes      Urine characteristics:  Dark and yellow  Post-procedure details:     Patient tolerance of procedure: Tolerated well, no immediate complications  Comments:      Site was prepped and draped in aseptic technique  Site was cleansed with betadine and lidocaine gel was inserted  16 Fr coude catheter was inserted with urine return  Balloon was inflated with 10mL's of sterile water   Urine return was 300mL's  Catheter was attached to leg bag and stat lock placed on leg  Patient was provided a night bag to change if needed              Rangel Umanzor RN

## 2022-12-02 NOTE — PROGRESS NOTES
12/2/2022    Dina Pemyla  1934  [id number]    Diagnosis  Chief Complaint    Urinary Retention         Patient presents for kelly removal managed by Dr Yoel Barrera  Return to office for follow up with provider    Procedure Kelly removal/voiding trial    Kelly catheter removed after deflation of an intact balloon  Patient tolerated well  Encouraged patient to hydrate well and return this afternoon for post void residual   he knows he may return early if uncomfortable and unable to urinate  Patient agrees to this plan  Patient returned this afternoon  Patient states unable to void  Patient voided 0 ml while in office  Bladder ultrasound performed and PVR measured 383ml      Kelly was placed see procedure note    Recent Results (from the past 4 hour(s))   POCT Measure PVR    Collection Time: 12/02/22  3:16 PM   Result Value Ref Range    POST-VOID RESIDUAL VOLUME, ML  mL           Vitals:    12/02/22 0902   BP: 138/76   BP Location: Left arm   Patient Position: Sitting   Cuff Size: Standard   Weight: 76 7 kg (169 lb)   Height: 6' (1 829 m)           Dominic Hanks RN

## 2022-12-02 NOTE — TELEPHONE ENCOUNTER
Patient failed his second TOV today  Please advise on the next step with patient  White was reinserted today

## 2022-12-05 ENCOUNTER — TELEPHONE (OUTPATIENT)
Dept: UROLOGY | Facility: MEDICAL CENTER | Age: 87
End: 2022-12-05

## 2022-12-05 NOTE — TELEPHONE ENCOUNTER
I spoke with patients daughter and provided an appt this Wednesday at 9:30am with Zee Reynolds MD  You 15 hours ago (7:27 PM)     Patient will need TURP and/or chronic tube  Needs appt with his daughter to discuss

## 2022-12-07 ENCOUNTER — OFFICE VISIT (OUTPATIENT)
Dept: UROLOGY | Facility: MEDICAL CENTER | Age: 87
End: 2022-12-07

## 2022-12-07 VITALS
SYSTOLIC BLOOD PRESSURE: 118 MMHG | HEART RATE: 78 BPM | BODY MASS INDEX: 22.89 KG/M2 | HEIGHT: 72 IN | DIASTOLIC BLOOD PRESSURE: 50 MMHG | WEIGHT: 169 LBS

## 2022-12-07 DIAGNOSIS — N13.8 BPH WITH OBSTRUCTION/LOWER URINARY TRACT SYMPTOMS: ICD-10-CM

## 2022-12-07 DIAGNOSIS — N20.0 CALCULUS OF KIDNEY: ICD-10-CM

## 2022-12-07 DIAGNOSIS — N40.1 BPH WITH OBSTRUCTION/LOWER URINARY TRACT SYMPTOMS: ICD-10-CM

## 2022-12-07 DIAGNOSIS — R33.9 URINARY RETENTION: Primary | ICD-10-CM

## 2022-12-07 NOTE — PROGRESS NOTES
H&P Exam - Urology   Kaitlyn Vasquez 80 y o  male MRN: [id number]  Unit/Bed#:  Encounter: [id number]    Assessment/Plan     Assessment:  Urinary retention  History of renal and ureteral stones  Plan:  TURP placement of suprapubic percutaneous cystostomy tube    History of Present Illness   HPI:  Kaitlyn Vasquez is a 80 y o  male who presents with a history of urinary retention and a 1 cm left distal ureteral calculus causing left hydronephrosis with the patient undergoing cystoscopy and left ureteroscopic laser lithotripsy November 15, 2022  Apparently the patient underwent hip surgery prior to urologic consultation  Prior to the hip surgery the patient denied any significant voiding difficulty other than nocturia  The patient however was in urinary retention after imaging of the abdomen revealed the above-noted ureteral stone which was treated on November 15  The patient has failed multiple voiding trials thereafter and now presents for TURP also failing alpha blockade  The patient and his daughter and I discussed the procedure understand risks of bleeding persistent retention incontinence need for additional operative procedures need for long-term White catheterization as well as the idea of a suprapubic tube for management of recurrent retention and or tracking of post void residual volumes  They agree to the procedure and I provided both verbal and signed informed consent  Review of Systems   Genitourinary: Positive for difficulty urinating  Musculoskeletal: Positive for arthralgias and myalgias  All other systems reviewed and are negative        Historical Information   Past Medical History:   Diagnosis Date   • BPH (benign prostatic hyperplasia)    • GERD (gastroesophageal reflux disease)    • Hyperlipidemia    • Hypertension    • Hypothyroidism    • Peripheral vascular disease ([facility] [address] )      Past Surgical History:   Procedure Laterality Date   • COLONOSCOPY     • FL RETROGRADE PYELOGRAM 10/18/2022   • JOINT REPLACEMENT Right     hip   • [place] CYSTO/URETERO W/LITHOTRIPSY &INDWELL STENT INSRT Left 11/15/2022    Procedure: CYSTOSCOPY URETEROSCOPY WITH LITHOTRIPSY HOLMIUM LASER, RETROGRADE PYELOGRAM AND INSERTION STENT URETERAL;  Surgeon: Jose Alberto Booth MD;  Location: [address] [place] [address] MAIN OR;  Service: Urology   • WA CYSTOSCOPY,INSERT URETERAL STENT Left 11/15/2022    Procedure: EXCHANGE STENT URETERAL;  Surgeon: Jose Alberto Booth MD;  Location: [address] [place] [address] MAIN OR;  Service: Urology   • WA CYSTOURETHROSCOPY,URETER CATHETER Left 10/18/2022    Procedure: CYSTOSCOPY RETROGRADE PYELOGRAM WITH INTERPRETATION AND WITH INSERTION STENT URETERAL;  Surgeon: Rodo Washington MD;  Location:  MAIN OR;  Service: Urology     Social History   Social History     Substance and Sexual Activity   Alcohol Use Not Currently     Social History     Substance and Sexual Activity   Drug Use Never     Social History     Tobacco Use   Smoking Status Former   • Types: Cigars, Pipe   • Quit date: 5   • Years since quittin 9   Smokeless Tobacco Never     Family History: History reviewed  No pertinent family history  Meds/Allergies   all medications and allergies reviewed  No Known Allergies    Objective   Vitals: Blood pressure 118/50, pulse 78, height 6' (1 829 m), weight 76 7 kg (169 lb)  [unfilled]    Invasive Devices     Peripheral Intravenous Line  Duration           Peripheral IV 10/18/22 Right Antecubital 50 days          Drain  Duration           Urethral Catheter Latex 18 Fr  49 days    Urethral Catheter Coude; Latex 16 Fr  4 days                Physical Exam  Vitals reviewed  Constitutional:       General: He is not in acute distress  Appearance: Normal appearance  He is normal weight  He is not ill-appearing, toxic-appearing or diaphoretic  HENT:      Head: Normocephalic and atraumatic        Nose: Nose normal       Mouth/Throat:      Mouth: Mucous membranes are moist    Eyes:      Extraocular Movements: Extraocular movements intact  Cardiovascular:      Rate and Rhythm: Normal rate and regular rhythm  Heart sounds: Normal heart sounds  Pulmonary:      Effort: Pulmonary effort is normal  No respiratory distress  Breath sounds: Normal breath sounds  No wheezing, rhonchi or rales  Abdominal:      General: Bowel sounds are normal  There is no distension  Palpations: Abdomen is soft  Tenderness: There is no abdominal tenderness  There is no guarding or rebound  Genitourinary:     Penis: Normal        Comments: White indwelling per urethra  Musculoskeletal:      Cervical back: Neck supple  Skin:     General: Skin is dry  Neurological:      Mental Status: He is alert and oriented to person, place, and time  Psychiatric:         Mood and Affect: Mood normal          Behavior: Behavior normal          Thought Content: Thought content normal          Judgment: Judgment normal          Lab Results: I have personally reviewed pertinent reports  Imaging: I have personally reviewed pertinent reports  and I have personally reviewed pertinent films in PACS  EKG, Pathology, and Other Studies: I have personally reviewed pertinent reports  and I have personally reviewed pertinent films in PACS  VTE Prophylaxis: Sequential compression device Richard Squires)     Code Status: @[facility]ODESUpper [facility] CenterUS@  Advance Directive and Living Will:      Power of :    POLST:      Counseling / Coordination of Care  Total floor / unit time spent today 30 minutes  Greater than 50% of total time was spent with the patient and / or family counseling and / or coordination of care  A description of the counseling / coordination of care: Gabino Zimmer

## 2022-12-07 NOTE — H&P
H&P Exam - Urology   Caitlyn Whitney 80 y o  male MRN: [id number]  Unit/Bed#:  Encounter: [id number]    Assessment/Plan     Assessment:  Urinary retention  History of renal and ureteral stones  Plan:  TURP placement of suprapubic percutaneous cystostomy tube    History of Present Illness   HPI:  Caitlyn Whitney is a 80 y o  male who presents with a history of urinary retention and a 1 cm left distal ureteral calculus causing left hydronephrosis with the patient undergoing cystoscopy and left ureteroscopic laser lithotripsy November 15, 2022  Apparently the patient underwent hip surgery prior to urologic consultation  Prior to the hip surgery the patient denied any significant voiding difficulty other than nocturia  The patient however was in urinary retention after imaging of the abdomen revealed the above-noted ureteral stone which was treated on November 15  The patient has failed multiple voiding trials thereafter and now presents for TURP also failing alpha blockade  The patient and his daughter and I discussed the procedure understand risks of bleeding persistent retention incontinence need for additional operative procedures need for long-term White catheterization as well as the idea of a suprapubic tube for management of recurrent retention and or tracking of post void residual volumes  They agree to the procedure and I provided both verbal and signed informed consent  Review of Systems   Genitourinary: Positive for difficulty urinating  Musculoskeletal: Positive for arthralgias and myalgias  All other systems reviewed and are negative        Historical Information   Past Medical History:   Diagnosis Date   • BPH (benign prostatic hyperplasia)    • GERD (gastroesophageal reflux disease)    • Hyperlipidemia    • Hypertension    • Hypothyroidism    • Peripheral vascular disease ([facility] [address] )      Past Surgical History:   Procedure Laterality Date   • COLONOSCOPY     • FL RETROGRADE PYELOGRAM 10/18/2022   • JOINT REPLACEMENT Right     hip   • ID CYSTO/URETERO W/LITHOTRIPSY &INDWELL STENT INSRT Left 11/15/2022    Procedure: CYSTOSCOPY URETEROSCOPY WITH LITHOTRIPSY HOLMIUM LASER, RETROGRADE PYELOGRAM AND INSERTION STENT URETERAL;  Surgeon: Juani Blackwell MD;  Location: [facility] MAIN OR;  Service: Urology   • ID CYSTOSCOPY,INSERT URETERAL STENT Left 11/15/2022    Procedure: EXCHANGE STENT URETERAL;  Surgeon: Juani Blackwell MD;  Location: [facility] MAIN OR;  Service: Urology   • ID CYSTOURETHROSCOPY,URETER CATHETER Left 10/18/2022    Procedure: CYSTOSCOPY RETROGRADE PYELOGRAM WITH INTERPRETATION AND WITH INSERTION STENT URETERAL;  Surgeon: Noemi Galvez MD;  Location:  MAIN OR;  Service: Urology     Social History   Social History     Substance and Sexual Activity   Alcohol Use Not Currently     Social History     Substance and Sexual Activity   Drug Use Never     Social History     Tobacco Use   Smoking Status Former   • Types: Cigars, Pipe   • Quit date: 5   • Years since quittin 9   Smokeless Tobacco Never     Family History: History reviewed  No pertinent family history  Meds/Allergies    all medications and allergies reviewed  No Known Allergies    Objective    Vitals: Blood pressure 118/50, pulse 78, height 6' (1 829 m), weight 76 7 kg (169 lb)  [unfilled]    Invasive Devices     Peripheral Intravenous Line  Duration           Peripheral IV 10/18/22 Right Antecubital 50 days          Drain  Duration           Urethral Catheter Latex 18 Fr  49 days    Urethral Catheter Coude; Latex 16 Fr  4 days                Physical Exam  Vitals reviewed  Constitutional:       General: He is not in acute distress  Appearance: Normal appearance  He is normal weight  He is not ill-appearing, toxic-appearing or diaphoretic  HENT:      Head: Normocephalic and atraumatic        Nose: Nose normal       Mouth/Throat:      Mouth: Mucous membranes are moist    Eyes:      Extraocular Movements: Extraocular movements intact  Cardiovascular:      Rate and Rhythm: Normal rate and regular rhythm  Heart sounds: Normal heart sounds  Pulmonary:      Effort: Pulmonary effort is normal  No respiratory distress  Breath sounds: Normal breath sounds  No wheezing, rhonchi or rales  Abdominal:      General: Bowel sounds are normal  There is no distension  Palpations: Abdomen is soft  Tenderness: There is no abdominal tenderness  There is no guarding or rebound  Genitourinary:     Penis: Normal        Comments: White indwelling per urethra  Musculoskeletal:      Cervical back: Neck supple  Skin:     General: Skin is dry  Neurological:      Mental Status: He is alert and oriented to person, place, and time  Psychiatric:         Mood and Affect: Mood normal          Behavior: Behavior normal          Thought Content: Thought content normal          Judgment: Judgment normal          Lab Results: I have personally reviewed pertinent reports  Imaging: I have personally reviewed pertinent reports  and I have personally reviewed pertinent films in PACS  EKG, Pathology, and Other Studies: I have personally reviewed pertinent reports  and I have personally reviewed pertinent films in PACS  VTE Prophylaxis: Sequential compression device Galilea Mccullough     Code Status: [unfilled]  Advance Directive and Living Will:      Power of :    POLST:      Counseling / Coordination of Care  Total floor / unit time spent today 30 minutes  Greater than 50% of total time was spent with the patient and / or family counseling and / or coordination of care  A description of the counseling / coordination of care: Spenser Restrepo

## 2022-12-14 ENCOUNTER — HOSPITAL ENCOUNTER (OUTPATIENT)
Dept: ULTRASOUND IMAGING | Facility: HOSPITAL | Age: 87
Discharge: HOME | End: 2022-12-14
Attending: UROLOGY

## 2022-12-14 DIAGNOSIS — N20.0 CALCULUS OF KIDNEY: ICD-10-CM

## 2023-01-04 ENCOUNTER — TELEPHONE (OUTPATIENT)
Dept: UROLOGY | Facility: AMBULATORY SURGERY CENTER | Age: 88
End: 2023-01-04

## 2023-01-04 NOTE — TELEPHONE ENCOUNTER
Pt is under the care of Paul Armstrong and was last seen 12/07/22    Pt's daughter is calling for the results of the US scan and what the next steps are for the patient  The US was done on 12/14/22      Eloy Stubbs can be reached at [phone]

## 2023-01-05 ENCOUNTER — PREP FOR PROCEDURE (OUTPATIENT)
Dept: UROLOGY | Facility: MEDICAL CENTER | Age: 88
End: 2023-01-05

## 2023-01-05 DIAGNOSIS — R39.89 SUSPECTED UTI: ICD-10-CM

## 2023-01-05 DIAGNOSIS — Z01.818 PREOP TESTING: ICD-10-CM

## 2023-01-05 DIAGNOSIS — N13.8 BPH WITH OBSTRUCTION/LOWER URINARY TRACT SYMPTOMS: ICD-10-CM

## 2023-01-05 DIAGNOSIS — N40.1 BPH WITH OBSTRUCTION/LOWER URINARY TRACT SYMPTOMS: ICD-10-CM

## 2023-01-05 NOTE — TELEPHONE ENCOUNTER
OK no problem  I do not know this pt  The results of the imaging came to me so I was following up to make sure everything he needs is addressed  Thank you so much!

## 2023-01-05 NOTE — H&P
History and physical examination        Assessment/Plan      Assessment:  Urinary retention  History of renal and ureteral stones  Plan:  TURP placement of suprapubic percutaneous cystostomy tube     History of Present Illness         HPI:  Nadine Gramajo is a 80 y o  male who presents with a history of urinary retention and a 1 cm left distal ureteral calculus causing left hydronephrosis with the patient undergoing cystoscopy and left ureteroscopic laser lithotripsy November 15, 2022  Apparently the patient underwent hip surgery prior to urologic consultation  Prior to the hip surgery the patient denied any significant voiding difficulty other than nocturia  The patient however was in urinary retention after imaging of the abdomen revealed the above-noted ureteral stone which was treated on November 15  The patient has failed multiple voiding trials thereafter and now presents for TURP also failing alpha blockade      The patient and his daughter and I discussed the procedure understand risks of bleeding persistent retention incontinence need for additional operative procedures need for long-term White catheterization as well as the idea of a suprapubic tube for management of recurrent retention and or tracking of post void residual volumes  They agree to the procedure and I provided both verbal and signed informed consent            Review of Systems   Genitourinary: Positive for difficulty urinating  Musculoskeletal: Positive for arthralgias and myalgias     All other systems reviewed and are negative         Historical Information         Medical History        Past Medical History:   Diagnosis Date   • BPH (benign prostatic hyperplasia)     • GERD (gastroesophageal reflux disease)     • Hyperlipidemia     • Hypertension     • Hypothyroidism     • Peripheral vascular disease [facility])           Surgical History         Past Surgical History:   Procedure Laterality Date   • COLONOSCOPY       • FL RETROGRADE PYELOGRAM   10/18/2022   • JOINT REPLACEMENT Right       hip   • ME CYSTO/URETERO W/LITHOTRIPSY &INDWELL STENT INSRT Left 11/15/2022     Procedure: CYSTOSCOPY URETEROSCOPY WITH LITHOTRIPSY HOLMIUM LASER, RETROGRADE PYELOGRAM AND INSERTION STENT URETERAL;  Surgeon: Britt Cohen MD;  Location: [address] [place] [address] MAIN OR;  Service: Urology   • ME CYSTOSCOPY,INSERT URETERAL STENT Left 11/15/2022     Procedure: EXCHANGE STENT URETERAL;  Surgeon: Britt Cohen MD;  Location: [address] [place] [address] MAIN OR;  Service: Urology   • ME CYSTOURETHROSCOPY,URETER CATHETER Left 10/18/2022     Procedure: CYSTOSCOPY RETROGRADE PYELOGRAM WITH INTERPRETATION AND WITH INSERTION STENT URETERAL;  Surgeon: Florecita Lloyd MD;  Location:  MAIN OR;  Service: Urology         Social History         Social History          Substance and Sexual Activity   Alcohol Use Not Currently      Social History          Substance and Sexual Activity   Drug Use Never      Social History           Tobacco Use   Smoking Status Former   • Types: Cigars, Pipe   • Quit date: 5   • Years since quittin 9   Smokeless Tobacco Never      Family History:   Family History   History reviewed  No pertinent family history         Meds/Allergies   all medications and allergies reviewed  No Known Allergies     Objective       Physical Exam  Vitals reviewed  Constitutional:       General: He is not in acute distress  Appearance: Normal appearance  He is normal weight  He is not ill-appearing, toxic-appearing or diaphoretic  HENT:      Head: Normocephalic and atraumatic  Nose: Nose normal       Mouth/Throat:      Mouth: Mucous membranes are moist    Eyes:      Extraocular Movements: Extraocular movements intact  Cardiovascular:      Rate and Rhythm: Normal rate and regular rhythm  Heart sounds: Normal heart sounds  Pulmonary:      Effort: Pulmonary effort is normal  No respiratory distress  Breath sounds: Normal breath sounds   No wheezing, rhonchi or rales    Abdominal:      General: Bowel sounds are normal  There is no distension  Palpations: Abdomen is soft  Tenderness: There is no abdominal tenderness  There is no guarding or rebound  Genitourinary:     Penis: Normal        Comments: White indwelling per urethra  Musculoskeletal:      Cervical back: Neck supple  Skin:     General: Skin is dry  Neurological:      Mental Status: He is alert and oriented to person, place, and time  Psychiatric:         Mood and Affect: Mood normal          Behavior: Behavior normal          Thought Content: Thought content normal          Judgment: Judgment normal             Lab Results: I have personally reviewed pertinent reports  Imaging: I have personally reviewed pertinent reports  and I have personally reviewed pertinent films in PACS  EKG, Pathology, and Other Studies: I have personally reviewed pertinent reports     and I have personally reviewed pertinent films in PACS  VTE Prophylaxis: Sequential compression device Eileen Fleming)

## 2023-01-05 NOTE — TELEPHONE ENCOUNTER
Ultrasound of kidney and bladder performed 12/14/2022 essentially unremarkable with no hydronephrosis noted  Bladder is decompressed by White catheter which is in place  It appears per prior office note patient will be scheduled for TURP  Dr Frederik Krabbe, where are we with scheduling his surgical intervention? Were you waiting for these results before proceeding? Will forward to surgical coordinator, as well

## 2023-01-06 ENCOUNTER — TELEPHONE (OUTPATIENT)
Dept: UROLOGY | Facility: MEDICAL CENTER | Age: 88
End: 2023-01-06

## 2023-01-06 NOTE — TELEPHONE ENCOUNTER
Spoke to daughter, scheduled TURP for [facility] 2/10/23 with Dr Radha Zapata  preop instructions given, Appts scheduled with PCP for clearance 1/10/23 @ 1:45 and post-op kelly removal 2/14/23 @ 9:00  Mailed all paperwork and orders

## 2023-01-19 ENCOUNTER — TELEPHONE (OUTPATIENT)
Dept: UROLOGY | Facility: MEDICAL CENTER | Age: 88
End: 2023-01-19

## 2023-01-19 NOTE — TELEPHONE ENCOUNTER
----- Message from Robert Brink sent at 1/19/2023  7:33 AM EST -----  Angeles See, please see Dr Padma Fowler message  ----- Message -----  From: Marti Arroyo MD  Sent: 1/16/2023   1:33 PM EST  To: Robert Brink    This patient is scheduled for a TURP at [facility]   Patient's cannot stay overnight at [facility] especially Friday night      This case needs to be scheduled at [address]

## 2023-02-13 ENCOUNTER — APPOINTMENT (OUTPATIENT)
Dept: LAB | Facility: HOSPITAL | Age: 88
End: 2023-02-13
Attending: UROLOGY

## 2023-02-13 ENCOUNTER — OFFICE VISIT (OUTPATIENT)
Dept: LAB | Facility: HOSPITAL | Age: 88
End: 2023-02-13
Attending: UROLOGY

## 2023-02-13 DIAGNOSIS — N40.1 BPH WITH OBSTRUCTION/LOWER URINARY TRACT SYMPTOMS: ICD-10-CM

## 2023-02-13 DIAGNOSIS — Z01.818 OTHER SPECIFIED PRE-OPERATIVE EXAMINATION: ICD-10-CM

## 2023-02-13 DIAGNOSIS — N13.8 BPH WITH OBSTRUCTION/LOWER URINARY TRACT SYMPTOMS: ICD-10-CM

## 2023-02-13 DIAGNOSIS — Z01.818 PREOP TESTING: ICD-10-CM

## 2023-02-13 LAB
ANION GAP SERPL CALCULATED.3IONS-SCNC: 9 MMOL/L (ref 4–13)
BASOPHILS # BLD AUTO: 0.02 THOUSANDS/ÂΜL (ref 0–0.1)
BASOPHILS NFR BLD AUTO: 0 % (ref 0–1)
BUN SERPL-MCNC: 23 MG/DL (ref 5–25)
CALCIUM SERPL-MCNC: 9.2 MG/DL (ref 8.3–10.1)
CHLORIDE SERPL-SCNC: 103 MMOL/L (ref 96–108)
CO2 SERPL-SCNC: 27 MMOL/L (ref 21–32)
CREAT SERPL-MCNC: 1.39 MG/DL (ref 0.6–1.3)
EOSINOPHIL # BLD AUTO: 0.09 THOUSAND/ÂΜL (ref 0–0.61)
EOSINOPHIL NFR BLD AUTO: 2 % (ref 0–6)
ERYTHROCYTE [DISTWIDTH] IN BLOOD BY AUTOMATED COUNT: 13.1 % (ref 11.6–15.1)
GFR SERPL CREATININE-BSD FRML MDRD: 44 ML/MIN/1.73SQ M
GLUCOSE P FAST SERPL-MCNC: 102 MG/DL (ref 65–99)
HCT VFR BLD AUTO: 40.8 % (ref 36.5–49.3)
HGB BLD-MCNC: 13.5 G/DL (ref 12–17)
IMM GRANULOCYTES # BLD AUTO: 0.01 THOUSAND/UL (ref 0–0.2)
IMM GRANULOCYTES NFR BLD AUTO: 0 % (ref 0–2)
LYMPHOCYTES # BLD AUTO: 2.21 THOUSANDS/ÂΜL (ref 0.6–4.47)
LYMPHOCYTES NFR BLD AUTO: 38 % (ref 14–44)
MCH RBC QN AUTO: 33.3 PG (ref 26.8–34.3)
MCHC RBC AUTO-ENTMCNC: 33.1 G/DL (ref 31.4–37.4)
MCV RBC AUTO: 101 FL (ref 82–98)
MONOCYTES # BLD AUTO: 0.51 THOUSAND/ÂΜL (ref 0.17–1.22)
MONOCYTES NFR BLD AUTO: 9 % (ref 4–12)
NEUTROPHILS # BLD AUTO: 2.99 THOUSANDS/ÂΜL (ref 1.85–7.62)
NEUTS SEG NFR BLD AUTO: 51 % (ref 43–75)
NRBC BLD AUTO-RTO: 0 /100 WBCS
PLATELET # BLD AUTO: 205 THOUSANDS/UL (ref 149–390)
PMV BLD AUTO: 9.3 FL (ref 8.9–12.7)
POTASSIUM SERPL-SCNC: 4.8 MMOL/L (ref 3.5–5.3)
RBC # BLD AUTO: 4.05 MILLION/UL (ref 3.88–5.62)
SODIUM SERPL-SCNC: 139 MMOL/L (ref 135–147)
WBC # BLD AUTO: 5.83 THOUSAND/UL (ref 4.31–10.16)

## 2023-02-14 ENCOUNTER — LAB REQUISITION (OUTPATIENT)
Dept: LAB | Facility: HOSPITAL | Age: 88
End: 2023-02-14

## 2023-02-14 ENCOUNTER — ANESTHESIA EVENT (OUTPATIENT)
Dept: PERIOP | Facility: HOSPITAL | Age: 88
End: 2023-02-14

## 2023-02-14 DIAGNOSIS — Z01.818 ENCOUNTER FOR OTHER PREPROCEDURAL EXAMINATION: ICD-10-CM

## 2023-02-14 LAB
ABO GROUP BLD: NORMAL
ATRIAL RATE: 68 BPM
BLD GP AB SCN SERPL QL: NEGATIVE
P AXIS: 38 DEGREES
PR INTERVAL: 164 MS
QRS AXIS: -2 DEGREES
QRSD INTERVAL: 96 MS
QT INTERVAL: 382 MS
QTC INTERVAL: 406 MS
RH BLD: POSITIVE
SPECIMEN EXPIRATION DATE: NORMAL
T WAVE AXIS: 67 DEGREES
VENTRICULAR RATE: 68 BPM

## 2023-02-14 RX ORDER — LOSARTAN POTASSIUM 50 MG/1
50 TABLET ORAL DAILY
COMMUNITY
Start: 2023-02-08 | End: 2023-02-25

## 2023-02-14 NOTE — PRE-PROCEDURE INSTRUCTIONS
Pre-Surgery Instructions:   Medication Instructions   • acetaminophen (TYLENOL) 325 mg tablet Uses PRN- OK to take day of surgery   • levothyroxine 125 mcg tablet Take day of surgery  • losartan (COZAAR) 50 mg tablet Hold day of surgery  • polyethylene glycol (MIRALAX) 17 g packet Hold day of surgery  • senna-docusate sodium (SENOKOT S) 8 6-50 mg per tablet Take night before surgery   • simvastatin (ZOCOR) 40 mg tablet Take night before surgery   Covid screening negative as per patient  Reviewed pre op medicine and showering instructions with patient via phone call, verbalizes understanding  Advised patient to stop taking non prescribed vitamins, herbal meds and NSAID's 7 days pre op  Advised may take Tylenol products if needed  Advised to take DOS medicine with a small sip water  Advised NPO after MN prior to surgery and surgical services will call (2/20) with scheduled time of hospital arrival     Advised to notify surgeon's office of any change in health status from now until surgery  Pt verbalized understanding of all instructions

## 2023-02-15 LAB
BACTERIA UR CULT: ABNORMAL
BACTERIA UR CULT: ABNORMAL

## 2023-02-16 DIAGNOSIS — R82.71 BACTERIURIA: Primary | ICD-10-CM

## 2023-02-16 RX ORDER — CIPROFLOXACIN 250 MG/1
250 TABLET, FILM COATED ORAL EVERY 12 HOURS SCHEDULED
Qty: 10 TABLET | Refills: 0 | Status: SHIPPED | OUTPATIENT
Start: 2023-02-16 | End: 2023-02-17 | Stop reason: SDUPTHER

## 2023-02-17 DIAGNOSIS — R82.71 BACTERIURIA: ICD-10-CM

## 2023-02-17 RX ORDER — CIPROFLOXACIN 250 MG/1
250 TABLET, FILM COATED ORAL EVERY 12 HOURS SCHEDULED
Qty: 10 TABLET | Refills: 0 | Status: SHIPPED | OUTPATIENT
Start: 2023-02-17 | End: 2023-02-22

## 2023-02-17 NOTE — TELEPHONE ENCOUNTER
Patient daughter called the cipro is not at [facility]  Upon looking to was sent to the wrong pharmacy   Please resend it to [place] [facility]

## 2023-02-20 PROCEDURE — NC001 PR NO CHARGE: Performed by: UROLOGY

## 2023-02-20 NOTE — H&P
HISTORY AND PHYSICAL  ? ? Patient Name: Lindsay Woodard  Patient MRN: [id number]  Attending Provider: Shiloh Keller MD  Service: Urology  Chief Complaint    BPH, urinary retention    HPI   Lindsay Woodard is a 80 y o  male with retention on and off for several weeks after stone procedure 3 months ago  Has not benefited from medications for his retention and BPH  I plan TURP and temporary suprapubic tube  Potential risks and complications discussed, and informed consent was given by the patient  Medications  Meds/Allergies   No current facility-administered medications for this encounter  Cannot display prior to admission medications because the patient has not been admitted in this contact  No current facility-administered medications for this encounter      Current Outpatient Medications:   •  acetaminophen (TYLENOL) 325 mg tablet, Take 2 tablets (650 mg total) by mouth every 6 (six) hours as needed for mild pain, Disp: , Rfl: 0  •  levothyroxine 125 mcg tablet, Take 125 mcg by mouth every morning, Disp: , Rfl:   •  losartan (COZAAR) 50 mg tablet, Take 50 mg by mouth daily, Disp: , Rfl:   •  polyethylene glycol (MIRALAX) 17 g packet, Take 17 g by mouth daily, Disp: , Rfl:   •  senna-docusate sodium (SENOKOT S) 8 6-50 mg per tablet, Take 2 tablets by mouth every evening, Disp: 60 tablet, Rfl: 11  •  simvastatin (ZOCOR) 40 mg tablet, Take 1 tablet by mouth daily at bedtime, Disp: , Rfl:   •  aspirin (ECOTRIN LOW STRENGTH) 81 mg EC tablet, Take 81 mg by mouth daily, Disp: , Rfl:   •  ciprofloxacin (CIPRO) 250 mg tablet, Take 1 tablet (250 mg total) by mouth every 12 (twelve) hours for 5 days, Disp: 10 tablet, Rfl: 0  •  gabapentin (Neurontin) 100 mg capsule, Take 1 capsule (100 mg total) by mouth daily at bedtime, Disp: 15 capsule, Rfl: 0  •  lidocaine (Lidoderm) 5 %, Apply 1 patch topically daily for 15 days Remove & Discard patch within 12 hours or as directed by MD (Patient taking differently: Apply 1 patch topically if needed Remove & Discard patch within 12 hours or as directed by MD), Disp: 15 patch, Rfl: 0  •  omeprazole (PriLOSEC) 20 mg delayed release capsule, Take 20 mg by mouth daily in the early morning, Disp: , Rfl:   •  tamsulosin (FLOMAX) 0 4 mg, Take 1 capsule (0 4 mg total) by mouth daily with dinner, Disp: 30 capsule, Rfl: 0  •  torsemide (DEMADEX) 10 mg tablet, Take 1 tablet by mouth daily (Patient not taking: Reported on 12/7/2022), Disp: , Rfl:   •  vitamin B-12 (VITAMIN B-12) 1,000 mcg tablet, Take 1 tablet (1,000 mcg total) by mouth daily, Disp: 30 tablet, Rfl: 0  Review of Systems  10 point review of systems negative except as noted in HPI  Allergies  No Known Allergies  PMH  Past Medical History:   Diagnosis Date   • BPH (benign prostatic hyperplasia)    • GERD (gastroesophageal reflux disease)    • Hyperlipidemia    • Hypertension    • Hypothyroidism    • Kidney stone    • Peripheral vascular disease ([facility] [place] [address] )    • Walker as ambulation aid      Past surgical history  Past Surgical History:   Procedure Laterality Date   • COLONOSCOPY     • FL RETROGRADE PYELOGRAM  10/18/2022   • JOINT REPLACEMENT Right 10/2022    hip   • AL CYSTO BLADDER W/URETERAL CATHETERIZATION Left 10/18/2022    Procedure: CYSTOSCOPY RETROGRADE PYELOGRAM WITH INTERPRETATION AND WITH INSERTION STENT URETERAL;  Surgeon: Rangel Flores MD;  Location:  MAIN OR;  Service: Urology   • AL CYSTO W/INSERT URETERAL STENT Left 11/15/2022    Procedure: EXCHANGE STENT URETERAL;  Surgeon: Margaret Peñaloza MD;  Location: [address] [place] [address] OR;  Service: Urology   • AL CYSTO/URETERO W/LITHOTRIPSY &INDWELL STENT INSRT Left 11/15/2022    Procedure: CYSTOSCOPY URETEROSCOPY WITH LITHOTRIPSY HOLMIUM LASER, RETROGRADE PYELOGRAM AND INSERTION STENT URETERAL;  Surgeon: Margaret Peñaloza MD;  Location: [address] [place] [address] OR;  Service: Urology     Social history  Social History     Tobacco Use   • Smoking status: Former     Types: Cigars, Pipe     Quit date: 1970 Years since quittin 1   • Smokeless tobacco: Never   Vaping Use   • Vaping Use: Never used   Substance Use Topics   • Alcohol use: Not Currently   • Drug use: Never     ?   Physical Exam     vs  General appearance: alert and oriented, in no acute distress  Head: Normocephalic, without obvious abnormality, atraumatic  Throat: lips, mucosa, and tongue normal; teeth and gums normal  Neck: no adenopathy, no carotid bruit, no JVD, supple, symmetrical, trachea midline and thyroid not enlarged, symmetric, no tenderness/mass/nodules  Lungs: clear to auscultation bilaterally  Heart: regular rate and rhythm, S1, S2 normal, no murmur, click, rub or gallop  Abdomen: soft, non-tender; bowel sounds normal; no masses,  no organomegaly  Extremities: extremities normal, warm and well-perfused; no cyanosis, clubbing, or edema  Emma Mcclure MD

## 2023-02-21 ENCOUNTER — ANESTHESIA (OUTPATIENT)
Dept: PERIOP | Facility: HOSPITAL | Age: 88
End: 2023-02-21

## 2023-02-21 ENCOUNTER — HOSPITAL ENCOUNTER (INPATIENT)
Facility: HOSPITAL | Age: 88
LOS: 4 days | Discharge: HOME HEALTH | DRG: 713 | End: 2023-02-25
Attending: UROLOGY | Admitting: INTERNAL MEDICINE
Payer: COMMERCIAL

## 2023-02-21 DIAGNOSIS — N40.1 BENIGN PROSTATIC HYPERPLASIA WITH URINARY RETENTION: ICD-10-CM

## 2023-02-21 DIAGNOSIS — I48.91 ATRIAL FIBRILLATION, UNSPECIFIED TYPE (HCC): Primary | ICD-10-CM

## 2023-02-21 DIAGNOSIS — K59.03 DRUG-INDUCED CONSTIPATION: ICD-10-CM

## 2023-02-21 DIAGNOSIS — R33.8 BENIGN PROSTATIC HYPERPLASIA WITH URINARY RETENTION: ICD-10-CM

## 2023-02-21 DIAGNOSIS — N39.0 URINARY TRACT INFECTION WITHOUT HEMATURIA, SITE UNSPECIFIED: ICD-10-CM

## 2023-02-21 DIAGNOSIS — I48.91 NEW ONSET ATRIAL FIBRILLATION (HCC): ICD-10-CM

## 2023-02-21 LAB
APTT PPP: 31 SECONDS (ref 23–37)
ERYTHROCYTE [DISTWIDTH] IN BLOOD BY AUTOMATED COUNT: 13.3 % (ref 11.6–15.1)
FLUAV RNA RESP QL NAA+PROBE: NEGATIVE
FLUBV RNA RESP QL NAA+PROBE: NEGATIVE
HCT VFR BLD AUTO: 35.7 % (ref 36.5–49.3)
HGB BLD-MCNC: 12.1 G/DL (ref 12–17)
INR PPP: 1.22 (ref 0.84–1.19)
MCH RBC QN AUTO: 33.3 PG (ref 26.8–34.3)
MCHC RBC AUTO-ENTMCNC: 33.9 G/DL (ref 31.4–37.4)
MCV RBC AUTO: 98 FL (ref 82–98)
PLATELET # BLD AUTO: 168 THOUSANDS/UL (ref 149–390)
PMV BLD AUTO: 8.9 FL (ref 8.9–12.7)
PROTHROMBIN TIME: 15.4 SECONDS (ref 11.6–14.5)
RBC # BLD AUTO: 3.63 MILLION/UL (ref 3.88–5.62)
RSV RNA RESP QL NAA+PROBE: NEGATIVE
SARS-COV-2 RNA RESP QL NAA+PROBE: NEGATIVE
WBC # BLD AUTO: 5.12 THOUSAND/UL (ref 4.31–10.16)

## 2023-02-21 PROCEDURE — 85610 PROTHROMBIN TIME: CPT

## 2023-02-21 PROCEDURE — 85027 COMPLETE CBC AUTOMATED: CPT

## 2023-02-21 PROCEDURE — 99223 1ST HOSP IP/OBS HIGH 75: CPT

## 2023-02-21 PROCEDURE — 0241U HB NFCT DS VIR RESP RNA 4 TRGT: CPT | Performed by: UROLOGY

## 2023-02-21 PROCEDURE — 99223 1ST HOSP IP/OBS HIGH 75: CPT | Performed by: INTERNAL MEDICINE

## 2023-02-21 PROCEDURE — 85730 THROMBOPLASTIN TIME PARTIAL: CPT

## 2023-02-21 PROCEDURE — 93041 RHYTHM ECG TRACING: CPT | Performed by: UROLOGY

## 2023-02-21 RX ORDER — PRAVASTATIN SODIUM 40 MG
40 TABLET ORAL
Status: DISCONTINUED | OUTPATIENT
Start: 2023-02-21 | End: 2023-02-25 | Stop reason: HOSPADM

## 2023-02-21 RX ORDER — TORSEMIDE 20 MG/1
10 TABLET ORAL DAILY
Status: DISCONTINUED | OUTPATIENT
Start: 2023-02-22 | End: 2023-02-25 | Stop reason: HOSPADM

## 2023-02-21 RX ORDER — PROPOFOL 10 MG/ML
INJECTION, EMULSION INTRAVENOUS AS NEEDED
Status: DISCONTINUED | OUTPATIENT
Start: 2023-02-21 | End: 2023-02-21

## 2023-02-21 RX ORDER — HEPARIN SODIUM 1000 [USP'U]/ML
4000 INJECTION, SOLUTION INTRAVENOUS; SUBCUTANEOUS EVERY 6 HOURS PRN
Status: DISCONTINUED | OUTPATIENT
Start: 2023-02-21 | End: 2023-02-24

## 2023-02-21 RX ORDER — ONDANSETRON 2 MG/ML
INJECTION INTRAMUSCULAR; INTRAVENOUS AS NEEDED
Status: DISCONTINUED | OUTPATIENT
Start: 2023-02-21 | End: 2023-02-21

## 2023-02-21 RX ORDER — FENTANYL CITRATE/PF 50 MCG/ML
25 SYRINGE (ML) INJECTION
Status: DISCONTINUED | OUTPATIENT
Start: 2023-02-21 | End: 2023-02-21 | Stop reason: HOSPADM

## 2023-02-21 RX ORDER — HEPARIN SODIUM 1000 [USP'U]/ML
2000 INJECTION, SOLUTION INTRAVENOUS; SUBCUTANEOUS EVERY 6 HOURS PRN
Status: DISCONTINUED | OUTPATIENT
Start: 2023-02-21 | End: 2023-02-24

## 2023-02-21 RX ORDER — CIPROFLOXACIN 2 MG/ML
400 INJECTION, SOLUTION INTRAVENOUS ONCE
Status: COMPLETED | OUTPATIENT
Start: 2023-02-21 | End: 2023-02-21

## 2023-02-21 RX ORDER — HYDROMORPHONE HCL/PF 1 MG/ML
0.5 SYRINGE (ML) INJECTION
Status: DISCONTINUED | OUTPATIENT
Start: 2023-02-21 | End: 2023-02-21 | Stop reason: HOSPADM

## 2023-02-21 RX ORDER — HEPARIN SODIUM 1000 [USP'U]/ML
4000 INJECTION, SOLUTION INTRAVENOUS; SUBCUTANEOUS ONCE
Status: COMPLETED | OUTPATIENT
Start: 2023-02-21 | End: 2023-02-21

## 2023-02-21 RX ORDER — PANTOPRAZOLE SODIUM 40 MG/1
40 TABLET, DELAYED RELEASE ORAL
Status: DISCONTINUED | OUTPATIENT
Start: 2023-02-22 | End: 2023-02-25 | Stop reason: HOSPADM

## 2023-02-21 RX ORDER — TAMSULOSIN HYDROCHLORIDE 0.4 MG/1
0.4 CAPSULE ORAL
Status: DISCONTINUED | OUTPATIENT
Start: 2023-02-21 | End: 2023-02-24

## 2023-02-21 RX ORDER — ONDANSETRON 2 MG/ML
4 INJECTION INTRAMUSCULAR; INTRAVENOUS ONCE AS NEEDED
Status: DISCONTINUED | OUTPATIENT
Start: 2023-02-21 | End: 2023-02-21 | Stop reason: HOSPADM

## 2023-02-21 RX ORDER — SODIUM CHLORIDE, SODIUM LACTATE, POTASSIUM CHLORIDE, CALCIUM CHLORIDE 600; 310; 30; 20 MG/100ML; MG/100ML; MG/100ML; MG/100ML
125 INJECTION, SOLUTION INTRAVENOUS CONTINUOUS
Status: DISCONTINUED | OUTPATIENT
Start: 2023-02-21 | End: 2023-02-21

## 2023-02-21 RX ORDER — HEPARIN SODIUM 10000 [USP'U]/100ML
3-20 INJECTION, SOLUTION INTRAVENOUS
Status: DISCONTINUED | OUTPATIENT
Start: 2023-02-21 | End: 2023-02-23

## 2023-02-21 RX ORDER — DILTIAZEM HYDROCHLORIDE 5 MG/ML
15 INJECTION INTRAVENOUS ONCE
Status: DISCONTINUED | OUTPATIENT
Start: 2023-02-21 | End: 2023-02-21

## 2023-02-21 RX ORDER — ONDANSETRON 2 MG/ML
4 INJECTION INTRAMUSCULAR; INTRAVENOUS EVERY 4 HOURS PRN
Status: DISCONTINUED | OUTPATIENT
Start: 2023-02-21 | End: 2023-02-23 | Stop reason: SDUPTHER

## 2023-02-21 RX ORDER — ACETAMINOPHEN 325 MG/1
650 TABLET ORAL EVERY 6 HOURS PRN
Status: DISCONTINUED | OUTPATIENT
Start: 2023-02-21 | End: 2023-02-25 | Stop reason: HOSPADM

## 2023-02-21 RX ADMIN — SODIUM CHLORIDE, SODIUM LACTATE, POTASSIUM CHLORIDE, AND CALCIUM CHLORIDE 125 ML/HR: .6; .31; .03; .02 INJECTION, SOLUTION INTRAVENOUS at 12:39

## 2023-02-21 RX ADMIN — PRAVASTATIN SODIUM 40 MG: 40 TABLET ORAL at 20:05

## 2023-02-21 RX ADMIN — DILTIAZEM HYDROCHLORIDE 5 MG/HR: 5 INJECTION INTRAVENOUS at 19:35

## 2023-02-21 RX ADMIN — PROPOFOL 120 MG: 10 INJECTION, EMULSION INTRAVENOUS at 14:41

## 2023-02-21 RX ADMIN — HEPARIN SODIUM 12 UNITS/KG/HR: 10000 INJECTION, SOLUTION INTRAVENOUS at 19:43

## 2023-02-21 RX ADMIN — TAMSULOSIN HYDROCHLORIDE 0.4 MG: 0.4 CAPSULE ORAL at 20:05

## 2023-02-21 RX ADMIN — ONDANSETRON 4 MG: 2 INJECTION INTRAMUSCULAR; INTRAVENOUS at 14:41

## 2023-02-21 RX ADMIN — CIPROFLOXACIN: 2 INJECTION, SOLUTION INTRAVENOUS at 14:29

## 2023-02-21 RX ADMIN — HEPARIN SODIUM 4000 UNITS: 1000 INJECTION INTRAVENOUS; SUBCUTANEOUS at 20:01

## 2023-02-21 RX ADMIN — SODIUM CHLORIDE, SODIUM LACTATE, POTASSIUM CHLORIDE, AND CALCIUM CHLORIDE 125 ML/HR: .6; .31; .03; .02 INJECTION, SOLUTION INTRAVENOUS at 15:20

## 2023-02-21 RX ADMIN — LIDOCAINE HYDROCHLORIDE 100 MG: 20 INJECTION INTRAVENOUS at 14:41

## 2023-02-21 NOTE — QUICK NOTE
New onset A fib on induction anesthesia - no prior hx of this  Case cancelled, will get cardiology consult    Pt and family informed

## 2023-02-21 NOTE — ANESTHESIA POSTPROCEDURE EVALUATION
Post-Op Assessment Note    CV Status:  Stable    Pain management: adequate     Mental Status:  Alert and awake   Hydration Status:  Euvolemic   PONV Controlled:  Controlled   Airway Patency:  Patent   There is a medical reason for not screening for obstructive sleep apnea and/or for not using two or more mitigation strategies   Post Op Vitals Reviewed: Yes      Comments: Procedure cancelled shortly after anesthesia induction  Patient in Atrial Fibrillation with RVR  Dr Kathrine Cheng requested cardiology consult i spoke to Dr Eliana Wisdom personally regarding Mr Jacinto Sethi            No notable events documented      BP      Temp     Pulse    Resp      SpO2      /73   Pulse (!) 128   Temp (!) 97 1 °F (36 2 °C)   Resp 13   Ht 6' (1 829 m)   Wt 77 1 kg (169 lb 15 6 oz)   SpO2 97%   BMI 23 05 kg/m²

## 2023-02-21 NOTE — INTERVAL H&P NOTE
H&P reviewed  After examining the patient I find no changes in the patients condition since the H&P had been written      Vitals:    02/21/23 1230   BP: 113/56   Pulse: 72   Resp: 16   Temp: 97 9 °F (36 6 °C)   SpO2: 100%

## 2023-02-21 NOTE — ANESTHESIA PREPROCEDURE EVALUATION
Procedure:  (TURP) (Urethra)  INSERTION SUPRAPUBIC CATHETER PERCUTANEOUS (Abdomen)    Relevant Problems   CARDIO   (+) Essential (primary) hypertension   (+) Hyperlipidemia      ENDO   (+) Hypothyroidism      GI/HEPATIC   (+) Gastro-esophageal reflux disease without esophagitis      /RENAL   (+) BPH (benign prostatic hyperplasia)   (+) Hydronephrosis with urinary obstruction due to renal calculus      NEURO/PSYCH   (+) History of fracture of right hip   (+) Status post fall      Nervous and Auditory   (+) Polyneuropathy, peripheral sensorimotor axonal      Genitourinary   (+) Urinary retention        Physical Exam    Airway    Mallampati score: II  TM Distance: >3 FB  Neck ROM: full     Dental   Comment: Upper partial out,     Cardiovascular  Rhythm: regular, Rate: normal,     Pulmonary  Breath sounds clear to auscultation,     Other Findings        Anesthesia Plan  ASA Score- 3     Anesthesia Type- general with ASA Monitors  Additional Monitors:   Airway Plan: LMA  Comment: S/p Right MIK, patient has difficult positioning in right hip and discussed positioning re-induction  Plan Factors-Exercise tolerance (METS): >4 METS  Chart reviewed  Patient is not a current smoker  Obstructive sleep apnea risk education given perioperatively  Induction- intravenous  Postoperative Plan- Plan for postoperative opioid use  Informed Consent- Anesthetic plan and risks discussed with patient

## 2023-02-21 NOTE — CONSULTS
Cardiology Consultation  MD Kyler Waters MD Berdie Elders, DO, [facility] FACP Ather Mansoor, MD Bobette Bridges, DO, Alison Marx DO, [facility]  ----------------------------------------------------------------  [address] [facility]  [address] [facility], [address]     Emily Vu 80 y o  male MRN: [id number]  Unit/Bed#: Mana Dsouza Encounter: [id number]      02/21/23    Referring Physician: Davida Howell MD    Chief Complain/Reason for Referal: A-fib    IMPRESSION:  1  A-fib RVR  2  Urinary retention where he was planned to have TURP with temporary suprapubic catheter  3  Hypertension  4  GERD  5  Hyperlipidemia  6  Hypothyroidism  7  History of right hip fracture  8  History of lumbar compression fractures      DISCUSSION/RECOMMENDATIONS:  • He is found to have A-fib RVR prior to urologic surgery  • This is a new diagnosis for him  • He is completely asymptomatic, unsure as to the duration of the A-fib  • He is on no rate controlling medications at this time  • Blood pressure is stable  • Heart rate is uncontrolled however between 120 140 bpm   Recommended admission under internal medicine  •   • Would start IV Cardizem for acute rate control  Would start with a 15 mg IV bolus then start Cardizem drip at 5 mg/h  • Start p o  Cardizem 30 mg every 6 hours as well  • Check echocardiogram  • TSH in October was 7 7  • Spoke with urology, likely could not get surgery done at least for the next week or so  Will place on IV heparin drip in the interim until the echo is performed  If rates difficult to control, may need to consider DC cardioversion  If this is the instance, would need anticoagulation x1 month after cardioversion before okay to come off for surgery  Adriane Delaney DO, [facility], [facility]    ----------------------------------------------------  EKG: A-fib RVR  TELE: A-fib RVR with heart rates 120 to 140 bpm      ======================================================    HPI:  I am seeing this patient in cardiology consultation for: A-fib RVR    David Sams is a 80 y o  male with:   · Urinary retention where he was planned to have TURP with temporary suprapubic catheter  · Hypertension  · GERD  · Hyperlipidemia  · Hypothyroidism  · History of right hip fracture  · History of lumbar compression fractures    He was here to have a transurethral resection of prostate with temporary suprapubic catheter placement however when he was brought to the OR, telemetry was placed and was found to be in A-fib RVR  He states he is completely asymptomatic regarding this, denies any chest pain palpitations shortness of breath dizziness lightheadedness syncope presyncope paroxysmal external dyspnea or diaphoresis  He states he is very active despite his age, does yard work all the time without issues  No prior history of cardiac disease, has never seen a cardiologist has no history of coronary disease and never underwent cardiac catheterization or PCI  Past Medical History:   Diagnosis Date   • BPH (benign prostatic hyperplasia)    • GERD (gastroesophageal reflux disease)    • Hyperlipidemia    • Hypertension    • Hypothyroidism    • Kidney stone    • Peripheral vascular disease ([facility] [address] )    • Walker as ambulation aid          Scheduled Meds:  Current Facility-Administered Medications   Medication Dose Route Frequency Provider Last Rate   • lactated ringers  125 mL/hr Intravenous Continuous Sandhya Butter Vaughan,  mL/hr (02/21/23 1520)     Continuous Infusions:lactated ringers, 125 mL/hr, Last Rate: 125 mL/hr (02/21/23 1520)      PRN Meds:  No Known Allergies  I reviewed the Home Medication list in the chart  History reviewed  No pertinent family history      Social History     Socioeconomic History   • Marital status: /Civil Union     Spouse name: Not on file   • Number of children: Not on file   • Years of education: Not on file   • Highest education level: Not on file   Occupational History   • Not on file   Tobacco Use   • Smoking status: Former     Types: Cigars, Pipe     Quit date: 5     Years since quittin 1   • Smokeless tobacco: Never   Vaping Use   • Vaping Use: Never used   Substance and Sexual Activity   • Alcohol use: Not Currently   • Drug use: Never   • Sexual activity: Not Currently   Other Topics Concern   • Not on file   Social History Narrative   • Not on file     Social Determinants of Health     Financial Resource Strain: Not on file   Food Insecurity: No Food Insecurity   • Worried About Running Out of Food in the Last Year: Never true   • Ran Out of Food in the Last Year: Never true   Transportation Needs: No Transportation Needs   • Lack of Transportation (Medical): No   • Lack of Transportation (Non-Medical): No   Physical Activity: Not on file   Stress: Not on file   Social Connections: Not on file   Intimate Partner Violence: Not on file   Housing Stability: Low Risk    • Unable to Pay for Housing in the Last Year: No   • Number of Places Lived in the Last Year: 1   • Unstable Housing in the Last Year: No       Review of Systems   Review of Systems   Constitutional: Negative for chills and fever  HENT: Negative for facial swelling and sore throat  Eyes: Negative for visual disturbance  Respiratory: Negative for cough, chest tightness, shortness of breath and wheezing  Cardiovascular: Negative for chest pain, palpitations and leg swelling  Gastrointestinal: Negative for abdominal pain, blood in stool, constipation, diarrhea, nausea and vomiting  Endocrine: Negative for cold intolerance and heat intolerance  Genitourinary: Positive for decreased urine volume and difficulty urinating  Negative for dysuria and hematuria  Musculoskeletal: Negative for arthralgias, back pain and myalgias  Skin: Negative for rash     Neurological: Negative for dizziness, syncope, weakness and numbness  Psychiatric/Behavioral: Negative for agitation, behavioral problems and confusion  The patient is not nervous/anxious  Vitals:    02/21/23 1551   BP: 115/71   Pulse: (!) 124   Resp: 12   Temp:    SpO2: 95%     I/O       02/19 0701  02/20 0700 02/20 0701  02/21 0700 02/21 0701  02/22 0700    I V  (mL/kg)   1350 (17 5)    IV Piggyback   200    Total Intake(mL/kg)   1550 (20 1)    Net   +1550               Weight (last 2 days)     Date/Time Weight    02/21/23 1239 77 1 (169 97)          Physical Exam  Constitutional: awake, alert and oriented, in no acute distress, no obvious deformities  Head: Normocephalic, without obvious abnormality, atraumatic  Eyes: conjunctivae clear and moist  Sclera anicteric  No xanthelasmas  Pupils equal bilaterally  Extraocular motions are full  Ear nose mouth and throat: ears are symmetrical bilaterally, hearing appears to be equal bilaterally, no nasal discharge or epistaxis, oropharynx is clear with moist mucous membranes  Neck: Trachea is midline, neck is supple, no thyromegaly or significant lymphadenopathy, there is full range of motion  Lungs: clear to auscultation bilaterally, no wheezes, no rales, no rhonchi, no accessory muscle use, breathing is nonlabored  Heart: Irregularly irregular tachycardic rhythm, S1-S2 normal, no murmur  Abdomen: soft, non-tender; bowel sounds normal; no masses, no organomegaly  Psychiatric: Patient is oriented to time, place, person, mood/affect is negative for depression, anxiety, agitation, appears to have appropriate insight  Skin: Skin is warm, dry, intact  No obvious rashes or lesions on exposed extremities  Nail beds are pink with no cyanosis or clubbing  Invalid input(s):  EOSPCT        Invalid input(s): LABGLOM        Invalid input(s): LABALBU  No results found for: TROPONINT                            I have personally reviewed the EKG, CXR and Telemetry images directly        Patient Active Problem List Diagnosis Date Noted   • BPH (benign prostatic hyperplasia)    • Peripheral vascular disease ([facility] [address] )    • Preoperative clearance 11/04/2022   • Drug-induced constipation 11/03/2022   • Cognitive decline 10/26/2022   • Hyponatremia 10/20/2022   • Status post fall 10/19/2022   • Urinary retention 10/19/2022   • History of fracture of right hip 10/19/2022   • Lumbar compression fracture ([facility] [address] ) 10/19/2022   • Abnormal urinalysis 10/18/2022   • Disorientation 10/18/2022   • Hydronephrosis with urinary obstruction due to renal calculus 10/18/2022   • Essential (primary) hypertension 10/08/2022   • Gastro-esophageal reflux disease without esophagitis 10/08/2022   • Hyperlipidemia 10/08/2022   • Polyneuropathy, peripheral sensorimotor axonal    • Hypothyroidism 07/17/2007       Portions of the record may have been created with voice recognition software  Occasional wrong word or "sound a like" substitutions may have occurred due to the inherent limitations of voice recognition software  Read the chart carefully and recognize, using context, where substitutions have occurred      Manny Garcia DO, [facility]  2/21/2023 4:05 PM

## 2023-02-22 ENCOUNTER — APPOINTMENT (INPATIENT)
Dept: NON INVASIVE DIAGNOSTICS | Facility: HOSPITAL | Age: 88
DRG: 713 | End: 2023-02-22
Payer: COMMERCIAL

## 2023-02-22 ENCOUNTER — ANESTHESIA EVENT (INPATIENT)
Dept: PERIOP | Facility: HOSPITAL | Age: 88
End: 2023-02-22

## 2023-02-22 ENCOUNTER — TELEPHONE (OUTPATIENT)
Dept: OTHER | Facility: HOSPITAL | Age: 88
End: 2023-02-22

## 2023-02-22 LAB
ALBUMIN SERPL BCP-MCNC: 3.2 G/DL (ref 3.5–5)
ALP SERPL-CCNC: 43 U/L (ref 34–104)
ALT SERPL W P-5'-P-CCNC: 15 U/L (ref 7–52)
ANION GAP SERPL CALCULATED.3IONS-SCNC: 5 MMOL/L (ref 4–13)
AORTIC ROOT: 3.4 CM
APICAL FOUR CHAMBER EJECTION FRACTION: 59 %
APTT PPP: 50 SECONDS (ref 23–37)
APTT PPP: 70 SECONDS (ref 23–37)
APTT PPP: 98 SECONDS (ref 23–37)
AST SERPL W P-5'-P-CCNC: 19 U/L (ref 13–39)
ATRIAL RATE: 166 BPM
BILIRUB SERPL-MCNC: 0.72 MG/DL (ref 0.2–1)
BUN SERPL-MCNC: 17 MG/DL (ref 5–25)
CALCIUM ALBUM COR SERPL-MCNC: 9.4 MG/DL (ref 8.3–10.1)
CALCIUM SERPL-MCNC: 8.8 MG/DL (ref 8.4–10.2)
CHLORIDE SERPL-SCNC: 106 MMOL/L (ref 96–108)
CO2 SERPL-SCNC: 26 MMOL/L (ref 21–32)
CREAT SERPL-MCNC: 1.07 MG/DL (ref 0.6–1.3)
DOP CALC LVOT AREA: 3.14 CM2
DOP CALC LVOT DIAMETER: 2 CM
E WAVE DECELERATION TIME: 219 MS
ERYTHROCYTE [DISTWIDTH] IN BLOOD BY AUTOMATED COUNT: 13.4 % (ref 11.6–15.1)
FRACTIONAL SHORTENING: 30 (ref 28–44)
GFR SERPL CREATININE-BSD FRML MDRD: 61 ML/MIN/1.73SQ M
GLUCOSE SERPL-MCNC: 95 MG/DL (ref 65–140)
HCT VFR BLD AUTO: 36.1 % (ref 36.5–49.3)
HGB BLD-MCNC: 12.2 G/DL (ref 12–17)
INTERVENTRICULAR SEPTUM IN DIASTOLE (PARASTERNAL SHORT AXIS VIEW): 0.9 CM
INTERVENTRICULAR SEPTUM: 0.9 CM (ref 0.6–1.1)
LAAS-AP2: 19.6 CM2
LAAS-AP4: 13.3 CM2
LEFT ATRIUM AREA SYSTOLE SINGLE PLANE A4C: 14.4 CM2
LEFT ATRIUM SIZE: 3.4 CM
LEFT INTERNAL DIMENSION IN SYSTOLE: 3.2 CM (ref 2.1–4)
LEFT VENTRICULAR INTERNAL DIMENSION IN DIASTOLE: 4.6 CM (ref 3.5–6)
LEFT VENTRICULAR POSTERIOR WALL IN END DIASTOLE: 1 CM
LEFT VENTRICULAR STROKE VOLUME: 57 ML
LVSV (TEICH): 57 ML
MCH RBC QN AUTO: 33.7 PG (ref 26.8–34.3)
MCHC RBC AUTO-ENTMCNC: 33.8 G/DL (ref 31.4–37.4)
MCV RBC AUTO: 100 FL (ref 82–98)
MV E'TISSUE VEL-LAT: 9 CM/S
MV E'TISSUE VEL-SEP: 9 CM/S
MV PEAK A VEL: 0.94 M/S
MV PEAK E VEL: 81 CM/S
MV STENOSIS PRESSURE HALF TIME: 64 MS
MV VALVE AREA P 1/2 METHOD: 3.44
PLATELET # BLD AUTO: 162 THOUSANDS/UL (ref 149–390)
PMV BLD AUTO: 8.8 FL (ref 8.9–12.7)
POTASSIUM SERPL-SCNC: 4.4 MMOL/L (ref 3.5–5.3)
PROT SERPL-MCNC: 5.5 G/DL (ref 6.4–8.4)
QRS AXIS: -59 DEGREES
QRSD INTERVAL: 78 MS
QT INTERVAL: 318 MS
QTC INTERVAL: 453 MS
RA PRESSURE ESTIMATED: 3 MMHG
RBC # BLD AUTO: 3.62 MILLION/UL (ref 3.88–5.62)
RIGHT ATRIUM AREA SYSTOLE A4C: 20.1 CM2
RIGHT VENTRICLE ID DIMENSION: 5.6 CM
RV PSP: 39 MMHG
SL CV LEFT ATRIUM LENGTH A2C: 5.3 CM
SL CV LV EF: 60
SL CV PED ECHO LEFT VENTRICLE DIASTOLIC VOLUME (MOD BIPLANE) 2D: 98 ML
SL CV PED ECHO LEFT VENTRICLE SYSTOLIC VOLUME (MOD BIPLANE) 2D: 42 ML
SODIUM SERPL-SCNC: 137 MMOL/L (ref 135–147)
T WAVE AXIS: 96 DEGREES
TR MAX PG: 36 MMHG
TR PEAK VELOCITY: 3 M/S
TRICUSPID ANNULAR PLANE SYSTOLIC EXCURSION: 2.7 CM
TRICUSPID VALVE PEAK REGURGITATION VELOCITY: 2.98 M/S
TSH SERPL DL<=0.05 MIU/L-ACNC: 3.2 UIU/ML (ref 0.45–4.5)
VENTRICULAR RATE: 122 BPM
WBC # BLD AUTO: 5.86 THOUSAND/UL (ref 4.31–10.16)

## 2023-02-22 PROCEDURE — 99024 POSTOP FOLLOW-UP VISIT: CPT | Performed by: PHYSICIAN ASSISTANT

## 2023-02-22 PROCEDURE — 85730 THROMBOPLASTIN TIME PARTIAL: CPT | Performed by: UROLOGY

## 2023-02-22 PROCEDURE — 97166 OT EVAL MOD COMPLEX 45 MIN: CPT

## 2023-02-22 PROCEDURE — 97163 PT EVAL HIGH COMPLEX 45 MIN: CPT

## 2023-02-22 PROCEDURE — 85730 THROMBOPLASTIN TIME PARTIAL: CPT | Performed by: INTERNAL MEDICINE

## 2023-02-22 PROCEDURE — 99232 SBSQ HOSP IP/OBS MODERATE 35: CPT

## 2023-02-22 PROCEDURE — 93042 RHYTHM ECG REPORT: CPT | Performed by: INTERNAL MEDICINE

## 2023-02-22 PROCEDURE — 85027 COMPLETE CBC AUTOMATED: CPT | Performed by: UROLOGY

## 2023-02-22 PROCEDURE — 80053 COMPREHEN METABOLIC PANEL: CPT | Performed by: UROLOGY

## 2023-02-22 PROCEDURE — 84443 ASSAY THYROID STIM HORMONE: CPT | Performed by: UROLOGY

## 2023-02-22 PROCEDURE — 93306 TTE W/DOPPLER COMPLETE: CPT

## 2023-02-22 RX ORDER — CIPROFLOXACIN 2 MG/ML
400 INJECTION, SOLUTION INTRAVENOUS ONCE
Status: COMPLETED | OUTPATIENT
Start: 2023-02-23 | End: 2023-02-23

## 2023-02-22 RX ORDER — DILTIAZEM HYDROCHLORIDE 120 MG/1
120 CAPSULE, COATED, EXTENDED RELEASE ORAL DAILY
Status: DISCONTINUED | OUTPATIENT
Start: 2023-02-22 | End: 2023-02-22

## 2023-02-22 RX ORDER — DILTIAZEM HYDROCHLORIDE 120 MG/1
120 CAPSULE, COATED, EXTENDED RELEASE ORAL DAILY
Status: DISCONTINUED | OUTPATIENT
Start: 2023-02-23 | End: 2023-02-22

## 2023-02-22 RX ORDER — DILTIAZEM HYDROCHLORIDE 120 MG/1
120 CAPSULE, COATED, EXTENDED RELEASE ORAL DAILY
Status: DISCONTINUED | OUTPATIENT
Start: 2023-02-22 | End: 2023-02-25 | Stop reason: HOSPADM

## 2023-02-22 RX ADMIN — LEVOTHYROXINE SODIUM 125 MCG: 25 TABLET ORAL at 06:52

## 2023-02-22 RX ADMIN — HEPARIN SODIUM 2000 UNITS: 1000 INJECTION INTRAVENOUS; SUBCUTANEOUS at 17:15

## 2023-02-22 RX ADMIN — PANTOPRAZOLE SODIUM 40 MG: 40 TABLET, DELAYED RELEASE ORAL at 06:52

## 2023-02-22 RX ADMIN — TAMSULOSIN HYDROCHLORIDE 0.4 MG: 0.4 CAPSULE ORAL at 17:12

## 2023-02-22 RX ADMIN — DILTIAZEM HYDROCHLORIDE 120 MG: 120 CAPSULE, COATED, EXTENDED RELEASE ORAL at 10:51

## 2023-02-22 RX ADMIN — PRAVASTATIN SODIUM 40 MG: 40 TABLET ORAL at 17:12

## 2023-02-22 RX ADMIN — HEPARIN SODIUM 12 UNITS/KG/HR: 10000 INJECTION, SOLUTION INTRAVENOUS at 17:13

## 2023-02-22 RX ADMIN — DILTIAZEM HYDROCHLORIDE 30 MG: 30 TABLET, FILM COATED ORAL at 01:31

## 2023-02-22 RX ADMIN — HEPARIN SODIUM 12 UNITS/KG/HR: 10000 INJECTION, SOLUTION INTRAVENOUS at 23:31

## 2023-02-22 NOTE — PHYSICAL THERAPY NOTE
PHYSICAL THERAPY NOTE          Patient Name: Kera Marcos  Today's Date: 2/22/2023 02/22/23 1030   PT Last Visit   PT Visit Date 02/22/23   Note Type   Note type Evaluation; Cancelled Session   Cancel Reasons Other   Additional Comments PT consult received  Chart reviewed  ECHO in progress  Will continue to follow as appropriate       Roque Guthrie

## 2023-02-22 NOTE — PLAN OF CARE
Problem: PAIN - ADULT  Goal: Verbalizes/displays adequate comfort level or baseline comfort level  Description: Interventions:  - Encourage patient to monitor pain and request assistance  - Assess pain using appropriate pain scale  - Administer analgesics based on type and severity of pain and evaluate response  - Implement non-pharmacological measures as appropriate and evaluate response  - Consider cultural and social influences on pain and pain management  - Notify physician/advanced practitioner if interventions unsuccessful or patient reports new pain  Outcome: Progressing     Problem: INFECTION - ADULT  Goal: Absence or prevention of progression during hospitalization  Description: INTERVENTIONS:  - Assess and monitor for signs and symptoms of infection  - Monitor lab/diagnostic results  - Monitor all insertion sites, i e  indwelling lines, tubes, and drains  - Monitor endotracheal if appropriate and nasal secretions for changes in amount and color  - [place] appropriate cooling/warming therapies per order  - Administer medications as ordered  - Instruct and encourage patient and family to use good hand hygiene technique  - Identify and instruct in appropriate isolation precautions for identified infection/condition  Outcome: Progressing  Goal: Absence of fever/infection during neutropenic period  Description: INTERVENTIONS:  - Monitor WBC    Outcome: Progressing     Problem: SAFETY ADULT  Goal: Patient will remain free of falls  Description: INTERVENTIONS:  - Educate patient/family on patient safety including physical limitations  - Instruct patient to call for assistance with activity   - Consult OT/PT to assist with strengthening/mobility   - Keep Call bell within reach  - Keep bed low and locked with side rails adjusted as appropriate  - Keep care items and personal belongings within reach  - Initiate and maintain comfort rounds  - Make Fall Risk Sign visible to staff  - Offer Toileting every 2 Hours, in advance of need  - Initiate/Maintain bed alarm  - Obtain necessary fall risk management equipment: alarm  - Apply yellow socks and bracelet for high fall risk patients  - Consider moving patient to room near nurses station  Outcome: Progressing  Goal: Maintain or return to baseline ADL function  Description: INTERVENTIONS:  -  Assess patient's ability to carry out ADLs; assess patient's baseline for ADL function and identify physical deficits which impact ability to perform ADLs (bathing, care of mouth/teeth, toileting, grooming, dressing, etc )  - Assess/evaluate cause of self-care deficits   - Assess range of motion  - Assess patient's mobility; develop plan if impaired  - Assess patient's need for assistive devices and provide as appropriate  - Encourage maximum independence but intervene and supervise when necessary  - Involve family in performance of ADLs  - Assess for home care needs following discharge   - Consider OT consult to assist with ADL evaluation and planning for discharge  - Provide patient education as appropriate  Outcome: Progressing  Goal: Maintains/Returns to pre admission functional level  Description: INTERVENTIONS:  - Perform BMAT or MOVE assessment daily    - Set and communicate daily mobility goal to care team and patient/family/caregiver  - Collaborate with rehabilitation services on mobility goals if consulted  - Perform Range of Motion 2 times a day  - Reposition patient every 2 hours    - Dangle patient 2 times a day  - Stand patient 2 times a day  - Ambulate patient 1 times a day  - Out of bed to chair 1 times a day   - Out of bed for meals 1 times a day  - Out of bed for toileting  - Record patient progress and toleration of activity level   Outcome: Progressing     Problem: DISCHARGE PLANNING  Goal: Discharge to home or other facility with appropriate resources  Description: INTERVENTIONS:  - Identify barriers to discharge w/patient and caregiver  - Arrange for needed discharge resources and transportation as appropriate  - Identify discharge learning needs (meds, wound care, etc )  - Arrange for interpretive services to assist at discharge as needed  - Refer to Case Management Department for coordinating discharge planning if the patient needs post-hospital services based on physician/advanced practitioner order or complex needs related to functional status, cognitive ability, or social support system  Outcome: Progressing     Problem: Knowledge Deficit  Goal: Patient/family/caregiver demonstrates understanding of disease process, treatment plan, medications, and discharge instructions  Description: Complete learning assessment and assess knowledge base  Interventions:  - Provide teaching at level of understanding  - Provide teaching via preferred learning methods  Outcome: Progressing     Problem: MOBILITY - ADULT  Goal: Maintain or return to baseline ADL function  Description: INTERVENTIONS:  -  Assess patient's ability to carry out ADLs; assess patient's baseline for ADL function and identify physical deficits which impact ability to perform ADLs (bathing, care of mouth/teeth, toileting, grooming, dressing, etc )  - Assess/evaluate cause of self-care deficits   - Assess range of motion  - Assess patient's mobility; develop plan if impaired  - Assess patient's need for assistive devices and provide as appropriate  - Encourage maximum independence but intervene and supervise when necessary  - Involve family in performance of ADLs  - Assess for home care needs following discharge   - Consider OT consult to assist with ADL evaluation and planning for discharge  - Provide patient education as appropriate  Outcome: Progressing  Goal: Maintains/Returns to pre admission functional level  Description: INTERVENTIONS:  - Perform BMAT or MOVE assessment daily    - Set and communicate daily mobility goal to care team and patient/family/caregiver     - Collaborate with rehabilitation services on mobility goals if consulted  - Perform Range of Motion 2 times a day  - Reposition patient every 2 hours    - Dangle patient 2 times a day  - Stand patient 1 times a day  - Ambulate patient 1 times a day  - Out of bed to chair 1 times a day   - Out of bed for meals 1 times a day  - Out of bed for toileting  - Record patient progress and toleration of activity level   Outcome: Progressing     Problem: Prexisting or High Potential for Compromised Skin Integrity  Goal: Skin integrity is maintained or improved  Description: INTERVENTIONS:  - Identify patients at risk for skin breakdown  - Assess and monitor skin integrity  - Assess and monitor nutrition and hydration status  - Monitor labs   - Assess for incontinence   - Turn and reposition patient  - Assist with mobility/ambulation  - Relieve pressure over bony prominences  - Avoid friction and shearing  - Provide appropriate hygiene as needed including keeping skin clean and dry  - Evaluate need for skin moisturizer/barrier cream  - Collaborate with interdisciplinary team   - Patient/family teaching  - Consider wound care consult   Outcome: Progressing

## 2023-02-22 NOTE — PLAN OF CARE
Problem: PHYSICAL THERAPY ADULT  Goal: Performs mobility at highest level of function for planned discharge setting  See evaluation for individualized goals  Description: Treatment/Interventions: Functional transfer training, LE strengthening/ROM, Elevations, Therapeutic exercise, Endurance training, Patient/family training, Bed mobility, Gait training, Spoke to nursing, OT  Equipment Recommended: Joleen Ansari (pt has)       See flowsheet documentation for full assessment, interventions and recommendations  Note: Prognosis: Good  Problem List: Decreased strength, Decreased endurance, Impaired balance, Decreased mobility, Pain  Assessment: Pt  88 y o male who presented for a scheduled TURP however noted to be in afib  Pt admitted for New onset atrial fibrillation ([facility] [address] ) w/ RVR &  Urinary retention (R33 9)    Pt referred to PT for mobility assessment & D/C planning w/ orders of up & OOB as tolerated  Please see above for information re: home set-up & PLOF as well as objective findings during PT assessment  PTA, pt reports being fairly I w/ RW  On eval, pt functioning below baseline hence will continue skilled PT to improve function & safety  Pt require S for bed mobility & transfers; CGAx1 for amb w/ RW + cues for techniques & safety  Gait deviations as above, slow w/ dec foot clearance but no gross LOB noted  Require cues to stay within RW SANDRO  The patient's AM-PAC Basic Mobility Inpatient Short Form Raw Score is 19  A Raw score of greater than 16 suggests the patient may benefit from discharge to home  Please also refer to the recommendation of the Physical Therapist for safe discharge planning  From PT standpoint, will anticipate safe return to home w/ family support when medically cleared  Will recommend HHPT & previous home services at home  No SOB & dizziness reported t/o session   Nsg staff most recent vital signs as follows: /70 (BP Location: Left arm)   Pulse 82   Temp 97 5 °F (36 4 °C) (Temporal)   Resp 17   Ht 6' (1 829 m)   Wt 76 7 kg (169 lb)   SpO2 98%   BMI 22 92 kg/m²   At end of session, pt OOB in chair in stable condition, call bell & phone in reach, chair alarm activated, all lines intact  Fall precautions reinforced w/ good understanding  CM to follow  Nsg staff to continue to mobilized pt (OOB in chair for all meals & ambulate in room/unit) as tolerated to prevent further decline in function  Nsg notified  Co-eval was necessary to complete this PT eval for the pt's best interest given pt's medical acuity & complexity  PT Discharge Recommendation: Home with home health rehabilitation    See flowsheet documentation for full assessment

## 2023-02-22 NOTE — H&P
[address] [facility]  H&P- Trevon Quinonez 1934, 80 y o  male MRN: [id number]  Unit/Bed#: E4 -01 Encounter: [id number]  Primary Care Provider: Ernesto Belle MD   Date and time admitted to hospital: 2/21/2023 11:29 AM    Assessment and Plan  * New onset atrial fibrillation [facility])  Assessment & Plan  Background: History of hypertension hyperlipidemia and hypothyroidism presents for elective TURP and possible suprapubic tube placement but was found to have rapid atrial fibrillation in OR holding  · Patient ready seen by cardiology  Started on diltiazem infusion and oral diltiazem and heparin infusion  · Check TSH in a m  Check echocardiogram   · Appears to have lower extremity swelling  Restart torsemide as patient has not been taking 10 mg daily as directed    BPH (benign prostatic hyperplasia)  Assessment & Plan  · We will schedule for TURP and possible SPT placement  · Both postponed for now due to rapid atrial fibrillation  Continue tamsulosin  Hypothyroidism  Assessment & Plan  · Continue levothyroxine 125 mcg daily  Recheck TSH in a m  Hyperlipidemia  Assessment & Plan  · Substitute simvastatin with pravastatin based on hospital formulary    Gastro-esophageal reflux disease without esophagitis  Assessment & Plan  · Continue PPI    Essential (primary) hypertension  Assessment & Plan  · Renal sufficiency noted on last blood work  · Recheck BMP but in the interim hold losartan      VTE Prophylaxis: Heparin Drip  Code Status: Level 1 - Full Code  Anticipated Length of Stay:  Patient will be admitted on an Inpatient basis with an anticipated length of stay of greater than than 2 midnights  Justification for Hospital Stay: New onset atrial fibrillation [facility])  Total Time for Visit, including Counseling / Coordination of Care: xx mins  Greater than 50% of this total time spent on direct patient counseling and coordination of care      Chief Complaint:     New atrial fibrillation prior to anticipated urologic procedure    History of Present Illness:    Peter Trejo is a 80 y o  male with a past medical history of hypertension hyperlipidemia and hypothyroidism who presented initially for elective TURP and possible suprapubic tube placement  He was in his usual state of health but due to recent urinary retention elective urologic procedure was recommended  In the OR holding he was noted to be in rapid atrial fibrillation but was asymptomatic  He denies any chest pain palpitations or shortness of breath  He did complain of lower extremity edema which he was told by visiting nurse to stop taking his torsemide  He has been seen by cardiology and started on heparin and diltiazem infusions with admission to medicine  Review of Systems:  Review of Systems   Constitutional: Negative for chills, diaphoresis and fever  HENT: Negative for facial swelling and trouble swallowing  Eyes: Negative for photophobia and visual disturbance  Respiratory: Negative for shortness of breath  Cardiovascular: Positive for leg swelling  Negative for chest pain and palpitations  Gastrointestinal: Negative for abdominal distention, abdominal pain, diarrhea, nausea and vomiting  Genitourinary: Positive for difficulty urinating  Negative for hematuria  Musculoskeletal: Negative for myalgias  Skin: Negative for rash  Neurological: Negative for seizures, speech difficulty and numbness  Psychiatric/Behavioral: The patient is not nervous/anxious  All other systems reviewed and are negative          Past Medical and Surgical History:   Past Medical History:   Diagnosis Date   • BPH (benign prostatic hyperplasia)    • GERD (gastroesophageal reflux disease)    • Hyperlipidemia    • Hypertension    • Hypothyroidism    • Kidney stone    • Peripheral vascular disease ([facility] [address] )    • Walker as ambulation aid      Past Surgical History:   Procedure Laterality Date   • COLONOSCOPY     • FL RETROGRADE PYELOGRAM  10/18/2022   • JOINT REPLACEMENT Right 10/2022    hip   • PA CYSTO BLADDER W/URETERAL CATHETERIZATION Left 10/18/2022    Procedure: CYSTOSCOPY RETROGRADE PYELOGRAM WITH INTERPRETATION AND WITH INSERTION STENT URETERAL;  Surgeon: Rodger Casarez MD;  Location:  MAIN OR;  Service: Urology   • PA CYSTO W/INSERT URETERAL STENT Left 11/15/2022    Procedure: EXCHANGE STENT URETERAL;  Surgeon: Reinaldo Coronado MD;  Location: [facility] MAIN OR;  Service: Urology   • PA CYSTO/URETERO W/LITHOTRIPSY &INDWELL STENT INSRT Left 11/15/2022    Procedure: CYSTOSCOPY URETEROSCOPY WITH LITHOTRIPSY HOLMIUM LASER, RETROGRADE PYELOGRAM AND INSERTION STENT URETERAL;  Surgeon: Reinaldo Coronado MD;  Location:  MAIN OR;  Service: Urology     Meds/Allergies: Allergies: No Known Allergies  Prior to Admission Medications   Prescriptions Last Dose Informant Patient Reported?  Taking?   ciprofloxacin (CIPRO) 250 mg tablet 2/20/2023 at 2000  No Yes   Sig: Take 1 tablet (250 mg total) by mouth every 12 (twelve) hours for 5 days   levothyroxine 125 mcg tablet 2/20/2023  Yes Yes   Sig: Take 125 mcg by mouth every morning   losartan (COZAAR) 50 mg tablet 2/20/2023  Yes Yes   Sig: Take 50 mg by mouth daily   omeprazole (PriLOSEC) 20 mg delayed release capsule   Yes No   Sig: Take 20 mg by mouth daily in the early morning   simvastatin (ZOCOR) 40 mg tablet 2/20/2023  Yes Yes   Sig: Take 1 tablet by mouth daily at bedtime   tamsulosin (FLOMAX) 0 4 mg   No No   Sig: Take 1 capsule (0 4 mg total) by mouth daily with dinner   torsemide (DEMADEX) 10 mg tablet   Yes No   Sig: Take 1 tablet by mouth daily      Facility-Administered Medications: None     Social History:     Social History     Socioeconomic History   • Marital status: /Civil Union     Spouse name: Not on file   • Number of children: Not on file   • Years of education: Not on file   • Highest education level: Not on file   Occupational History   • Not on file Tobacco Use   • Smoking status: Former     Types: Cigars, Pipe     Quit date: 1970     Years since quittin 1   • Smokeless tobacco: Never   Vaping Use   • Vaping Use: Never used   Substance and Sexual Activity   • Alcohol use: Not Currently   • Drug use: Never   • Sexual activity: Not Currently   Other Topics Concern   • Not on file   Social History Narrative   • Not on file     Social Determinants of Health     Financial Resource Strain: Not on file   Food Insecurity: No Food Insecurity   • Worried About Running Out of Food in the Last Year: Never true   • Ran Out of Food in the Last Year: Never true   Transportation Needs: No Transportation Needs   • Lack of Transportation (Medical): No   • Lack of Transportation (Non-Medical): No   Physical Activity: Not on file   Stress: Not on file   Social Connections: Not on file   Intimate Partner Violence: Not on file   Housing Stability: Low Risk    • Unable to Pay for Housing in the Last Year: No   • Number of Places Lived in the Last Year: 1   • Unstable Housing in the Last Year: No     Patient Pre-hospital Living Situation:   Patient Pre-hospital Level of Mobility:   Patient Pre-hospital Diet Restrictions:     Family History:  History reviewed  No pertinent family history  Physical Exam:   Vitals:   Blood Pressure: 138/73 (23 1626)  Pulse: (!) 128 (23 1626)  Temperature: (!) 97 1 °F (36 2 °C) (23 1626)  Temp Source: Temporal (23 1230)  Respirations: 13 (23 1626)  Height: 6' (182 9 cm) (23 1239)  Weight - Scale: 77 1 kg (169 lb 15 6 oz) (23 1239)  SpO2: 97 % (23 1626)    Physical Exam  Vitals reviewed  Constitutional:       General: He is not in acute distress  Appearance: Normal appearance  HENT:      Head: Atraumatic  Eyes:      Extraocular Movements: Extraocular movements intact  Cardiovascular:      Rate and Rhythm: Tachycardia present  Rhythm irregular  Heart sounds: Normal heart sounds  Pulmonary:      Effort: Pulmonary effort is normal       Breath sounds: Decreased breath sounds present  No wheezing  Abdominal:      General: Bowel sounds are normal       Palpations: Abdomen is soft  Tenderness: There is no abdominal tenderness  There is no rebound  Musculoskeletal:         General: No swelling or tenderness  Cervical back: Normal range of motion  Right lower leg: Edema present  Left lower leg: Edema present  Skin:     General: Skin is warm and dry  Neurological:      General: No focal deficit present  Mental Status: He is alert  Cranial Nerves: No cranial nerve deficit  Psychiatric:         Mood and Affect: Mood normal        Lab Results: I have personally reviewed pertinent reports  Results from last 7 days   Lab Units 02/21/23  1852   WBC Thousand/uL 5 12   HEMOGLOBIN g/dL 12 1   HEMATOCRIT % 35 7*   PLATELETS Thousands/uL 168         Results from last 7 days   Lab Units 02/21/23  1852   INR  1 22*            Results from last 7 days   Lab Units 02/21/23  1154   SARS-COV-2  Negative   INFLUENZA A PCR  Negative   INFLUENZA B PCR  Negative   RSV PCR  Negative       Imaging:   No results found  EKG, Pathology, and Other Studies Reviewed on Admission:   EKG  Result Date: 02/21/23  Personally reviewed strips with impression of: Atrial fibrillation 122 bpm    Allscripts/ Epic Records Reviewed: Yes    ** Please Note: This note has been constructed using a voice recognition system   **

## 2023-02-22 NOTE — ASSESSMENT & PLAN NOTE
· With history of hypertension, hyperlipidemia, and hypothyroidism presented for elective TURP and possible suprapubic tube placement 2/21/2023 but was found to have rapid atrial fibrillation in OR holding  Patient asymptomatic  · Patient ready seen by cardiology, started on diltiazem gtt, short acting oral diltiazem and heparin infusion  · With chronic pedal edema, torsemide 10 mg daily started on admission as patient reported noncompliance  · TSH within normal limits  · Echo with LVEF 03%, normal systolic function, P6II  · Patient converted to normal sinus rhythm this AM   Cardizem gtt D/C'd, patient started on long acting Cardizem 120 mg 24 hr tablet  · Continues on heparin drip, pending urology input in regards to rescheduling TURP with temporary suprapubic catheter  Can be done here, will keep patient on heparin drip until then  If not may elect to keep on Lovenox in the short-term and stop prior to surgery with DOAC after

## 2023-02-22 NOTE — QUICK NOTE
Urology planning for TURP with possible suprapubic tube placement tomorrow around 1530     We will stop heparin drip tomorrow a m  6 hours prior to procedure, at 0930  Updated patient's daughter, Tatiana Abts  NPO at midnight

## 2023-02-22 NOTE — PROGRESS NOTES
Progress Note - Cardiology   Figueroa Beaulieu 80 y o  male MRN: [id number]  Unit/Bed#: E4 -01 Encounter: [id number]      Assessment/Recommendations/Discussion:   1  A-fib RVR -now spontaneously converted to sinus rhythm  2  Urinary retention where he was planned to have TURP with temporary suprapubic catheter  3  Hypertension  4  GERD  5  Hyperlipidemia  6  Hypothyroidism  7  History of right hip fracture  8  History of lumbar compression fractures           PLAN  • He spontaneously converted to sinus rhythm with the Cardizem  • He is completely asymptomatic regarding the A-fib, unknown as to the duration of this issue for him given this  We will continue with Cardizem, convert to long-acting 120 mg dose today  • We will reach out to urology regarding rescheduling of his TURP with temporary suprapubic catheter surgery  If this cannot be done until next week, may elect to keep him on Lovenox in the short-term for then we can stop it prior to surgery and start a direct oral anticoagulant thereafter  If his surgery could be performed later this week would keep him hospitalized on the heparin drip until then  • We will get echo today  Subjective:   HPI  Doing well, asymptomatic, spontaneously converted with the Cardizem      Review of Systems: As noted in HPI  Rest of ROS is negative      Vitals:   /53 (BP Location: Right arm)   Pulse 65   Temp 97 5 °F (36 4 °C) (Temporal)   Resp 17   Ht 6' (1 829 m)   Wt 77 1 kg (169 lb 15 6 oz)   SpO2 98%   BMI 23 05 kg/m²   I/O       02/20 0701  02/21 0700 02/21 0701  02/22 0700 02/22 0701  02/23 0700    I V  (mL/kg)  1350 (17 5)     IV Piggyback  200     Total Intake(mL/kg)  1550 (20 1)     Net  +1550                Weight (last 2 days)     Date/Time Weight    02/21/23 1239 77 1 (169 97)          Physical Exam   Constitutional: awake, alert and oriented, in no acute distress, no obvious deformities  Head: Normocephalic, without obvious abnormality, atraumatic  Eyes: conjunctivae clear and moist  Sclera anicteric  No xanthelasmas  Pupils equal bilaterally  Extraocular motions are full  Ear nose mouth and throat: ears are symmetrical bilaterally, hearing appears to be equal bilaterally, no nasal discharge or epistaxis, oropharynx is clear with moist mucous membranes  Neck: Trachea is midline, neck is supple, no thyromegaly or significant lymphadenopathy, there is full range of motion  Lungs: clear to auscultation bilaterally, no wheezes, no rales, no rhonchi, no accessory muscle use, breathing is nonlabored  Heart: regular rate and rhythm, S1, S2 normal, no murmur, no click, no rub and no gallop, no lower extremity edema  Abdomen: soft, non-tender; bowel sounds normal; no masses, no organomegaly  Psychiatric: Patient is oriented to time, place, person, mood/affect is negative for depression, anxiety, agitation, appears to have appropriate insight  Skin: Skin is warm, dry, intact  No obvious rashes or lesions on exposed extremities  Nail beds are pink with no cyanosis or clubbing    , he has a White in place    TELEMETRY: Sinus rhythm now  Lab Results:  Results from last 7 days   Lab Units 02/22/23  0503   WBC Thousand/uL 5 86   HEMOGLOBIN g/dL 12 2   HEMATOCRIT % 36 1*   PLATELETS Thousands/uL 162     Results from last 7 days   Lab Units 02/22/23  0503   POTASSIUM mmol/L 4 4   CHLORIDE mmol/L 106   CO2 mmol/L 26   BUN mg/dL 17   CREATININE mg/dL 1 07   CALCIUM mg/dL 8 8   ALK PHOS U/L 43   ALT U/L 15   AST U/L 19     Results from last 7 days   Lab Units 02/22/23  0503   POTASSIUM mmol/L 4 4   CHLORIDE mmol/L 106   CO2 mmol/L 26   BUN mg/dL 17   CREATININE mg/dL 1 07   CALCIUM mg/dL 8 8           Medications:    Current Facility-Administered Medications:   •  acetaminophen (TYLENOL) tablet 650 mg, 650 mg, Oral, Q6H PRN, Bess Couch DO  •  diltiazem (CARDIZEM) 125 mg in sodium chloride 0 9 % 125 mL infusion, 1-15 mg/hr, Intravenous, Titrated, Damari Brink DO Demi, Stopped at 02/22/23 0131  •  diltiazem (CARDIZEM) tablet 30 mg, 30 mg, Oral, Q6H [facility], Courtney Anderson DO, 30 mg at 02/22/23 0131  •  heparin (porcine) 25,000 units in 0 45% NaCl 250 mL infusion (premix), 3-20 Units/kg/hr (Order-Specific), Intravenous, Titrated, Courtney Anderson DO, Last Rate: 7 5 mL/hr at 02/22/23 0301, 10 Units/kg/hr at 02/22/23 0301  •  heparin (porcine) injection 2,000 Units, 2,000 Units, Intravenous, Q6H PRN, Courtney Anderson DO  •  heparin (porcine) injection 4,000 Units, 4,000 Units, Intravenous, Q6H PRN, Courtney Anderson DO  •  levothyroxine tablet 125 mcg, 125 mcg, Oral, Early Morning, Keo Loomis DO, 125 mcg at 02/22/23 3984  •  ondansetron (ZOFRAN) injection 4 mg, 4 mg, Intravenous, Q4H PRN, Roque Mendoza DO  •  pantoprazole (PROTONIX) EC tablet 40 mg, 40 mg, Oral, Early Morning, Keo Loomis DO, 40 mg at 02/22/23 9455  •  pravastatin (PRAVACHOL) tablet 40 mg, 40 mg, Oral, Daily With Dinner, Keo Loomis DO, 40 mg at 02/21/23 2005  •  tamsulosin (FLOMAX) capsule 0 4 mg, 0 4 mg, Oral, Daily With Dinner, Keo Loomis DO, 0 4 mg at 02/21/23 2005  •  torsemide (DEMADEX) tablet 10 mg, 10 mg, Oral, Daily, Roque Mendoza, DO    Portions of the record may have been created with voice recognition software  Occasional wrong word or "sound a like" substitutions may have occurred due to the inherent limitations of voice recognition software  Read the chart carefully and recognize, using context, where substitutions have occurred      Patricia Kimble DO, [facility]  2/22/2023 10:06 AM

## 2023-02-22 NOTE — PLAN OF CARE
Problem: OCCUPATIONAL THERAPY ADULT  Goal: Performs self-care activities at highest level of function for planned discharge setting  See evaluation for individualized goals  Description: Treatment Interventions: ADL retraining, Functional transfer training, UE strengthening/ROM, Endurance training, Patient/family training, Equipment evaluation/education, Compensatory technique education, Continued evaluation, Activityengagement          See flowsheet documentation for full assessment, interventions and recommendations  Note: Limitation: Decreased ADL status, Decreased UE strength, Decreased Safe judgement during ADL, Decreased endurance, Decreased self-care trans, Decreased high-level ADLs  Prognosis: Good  Assessment: Pt is a 80 y o  male seen for OT evaluation s/p adm to [facility] on 2/21/2023 w/ New onset of atrial fibrillation  Pt presented initially for elective TURP and possible suprapubic tube placement  In the OR holding he was noted to be in rapid A-Fib but was asymptomatic  Comorbidities affecting pt’s functional performance include a significant PMH of BPH, HLD, HTN, PVD, and recent R hip fx s/p R hip hemiarthroplasty on 10/8/23  Pt with active OT orders and activity orders for Up and OOB as tolerated   Pt lives with spouse in a one level house with 2 DUARTE  Pt reports spouse w/ cognitive deficits, unable to provide assist to pt  Pt and spouse have 24/7 live-in caregivers  At baseline, pt was I w/ UB ADLs and toileting and required assist w/ LB ADLs and IADLs  Pt reports Mod I for functional transfers/mobility w/ use of RW  (-)   +Fall PTA   Upon evaluation, pt currently requires Supervision for UB ADLs, Mod-Min A for LB ADLs, Min A for toileting, Supervision for bed mobility, Supervision for transfers, and Min A (CGA) for functional mobility 2* the following deficits impacting occupational performance: multiple lines, limited functional reach, decreased strength, decreased balance, decreased tolerance and increased pain  These impairments, as well at pt’s fall risk, difficulty performing transfers/mobility, steps to enter environment and difficulty performing ADLS limit pt’s ability to safely engage in all baseline areas of occupation  Based on the aforementioned OT evaluation, functional performance deficits, and assessments, pt has been identified as a Moderate complexity evaluation  Pt to continue to benefit from continued acute OT services during hospital stay to address defined deficits and to maximize level of functional independence in the following Occupational Performance areas: grooming, bathing/shower, toilet hygiene, dressing, health maintenance, functional mobility, community mobility and clothing management  From OT standpoint, recommend Home OT upon D/C   OT will continue to follow pt 2-3x/wk to address the following goals to  w/in 10-14 days:     OT Discharge Recommendation: Home with home health rehabilitation

## 2023-02-22 NOTE — UTILIZATION REVIEW
Initial Clinical Review    Admission: Date/Time/Statement:   Admission Orders (From admission, onward)     Ordered        02/21/23 1926  Inpatient Admission  Once                      Orders Placed This Encounter   Procedures   • Inpatient Admission     Standing Status:   Standing     Number of Occurrences:   1     Order Specific Question:   Level of Care     Answer:   Med Surg [16]     Order Specific Question:   Estimated length of stay     Answer:   More than 2 Midnights     Order Specific Question:   Certification     Answer:   I certify that inpatient services are medically necessary for this patient for a duration of greater than two midnights  See H&P and MD Progress Notes for additional information about the patient's course of treatment  Initial Presentation: 80 y o  male presents as an elective admission for Urologic surgery and developed new onset A fib at the induction of anesthesia  Case was cancelled and pt was a medicine admit  PMH: BPH, hypothyroid, HLD, GERD, HTN  He is admitted to INPATIENT status with New onset A fib - Cardio consult, Cardizem drip, Heparin drip, PO Cardizem started, Echo, restarting Torsemide d/t BLE edema  BPH - reschedule TURP, continue Tamsulosin  HTN - hold Losartan     2/21 Cardio Consult - New onset A fib w/ RVR on anesthesia induction for elective surgery  Asymptomatic, BP stable, HR uncontrolled  IV Cardizem drip with Cardizem 15 mg bolus first, Cardizem 30 mg PO q 6 rh, Echo, IV heparin drip, Echo, if unable to control will consider DC cardioversion  If this occurs will need AC x 1 month before surgery can be rescheduled and pt can be off AC  Date: 2/22   Day 2:   Pt is off Cardizem drip after spontaneous conversion to NSR  Continues on PO Cardizem and will convert to long acting Cardizem 120 mg daily  Remains on Heparin drip  If surgery will be done next week may do Lovenox til OR and then do DOAC  If later this week, will keep on Heparin drip  Echo  ED Triage Vitals   Temperature Pulse Respirations Blood Pressure SpO2   02/21/23 1230 02/21/23 1230 02/21/23 1230 02/21/23 1230 02/21/23 1230   97 9 °F (36 6 °C) 72 16 113/56 100 %      Temp Source Heart Rate Source Patient Position - Orthostatic VS BP Location FiO2 (%)   02/21/23 1230 02/21/23 1230 02/21/23 1933 02/21/23 1933 --   Temporal Monitor Lying Left arm       Pain Score       02/21/23 1239       No Pain          Wt Readings from Last 1 Encounters:   02/22/23 76 7 kg (169 lb)     Additional Vital Signs:   02/22/23 1135 -- 82 17 115/70 -- -- -- -- Lying   02/22/23 1042 -- 70 -- 107/61 71 -- -- -- Lying   02/22/23 0741 97 5 °F (36 4 °C) 65 17 106/53 -- 98 % None (Room air) -- Lying   02/22/23 0300 97 7 °F (36 5 °C) 65 -- 101/49 Abnormal  71 98 % -- -- Lying   02/22/23 0131 -- -- -- 100/50 -- -- -- -- --   02/21/23 2300 97 9 °F (36 6 °C) 68 16 102/54 61 Abnormal  96 % None (Room air) -- Lying   02/21/23 1933 98 °F (36 7 °C) 82 16 111/56 81 98 % None (Room air) -- Lying   02/21/23 1626 97 1 °F (36 2 °C) Abnormal  128 Abnormal  13 138/73 93 97 % None (Room air) X --   02/21/23 1608 -- -- 22 -- -- -- None (Room air) X --   02/21/23 1551 -- 124 Abnormal  12 115/71 85 95 % None (Room air) -- --   02/21/23 1540 -- 126 Abnormal  12 112/73 88 98 % None (Room air) -- --   02/21/23 1525 -- 124 Abnormal  13 108/64 79 98 % None (Room air) -- --   02/21/23 1507 97 7 °F (36 5 °C) 118 Abnormal  12 104/52 74 98 % None (Room air) -- --   02/21/23 1230 97 9 °F (36 6 °C) 72 16 113/56 -- 100 % None (Room air) -- --     Pertinent Labs/Diagnostic Test Results:     2/21 ECG - Atrial fibrillation with rapid ventricular response  Left axis deviation  Nonspecific ST and T wave abnormality  Abnormal ECG    2/22 Echo - •  Left Ventricle: Left ventricular cavity size is normal  Wall thickness is normal  The left ventricular ejection fraction is 60%   Systolic function is normal  Wall motion is normal  Diastolic function is mildly abnormal, consistent with grade I (abnormal) relaxation  •  Right Ventricle: Right ventricular cavity size is moderately dilated  •  Right Atrium: The atrium is mildly dilated  •  Aortic Valve: The aortic valve has no significant stenosis  •  Mitral Valve: There is mild regurgitation  •  Tricuspid Valve: There is mild to moderate regurgitation  The right ventricular systolic pressure is mildly elevated  The estimated right ventricular systolic pressure is 79 40 mmHg      Results from last 7 days   Lab Units 02/21/23  1154   SARS-COV-2  Negative     Results from last 7 days   Lab Units 02/22/23  0503 02/21/23  1852   WBC Thousand/uL 5 86 5 12   HEMOGLOBIN g/dL 12 2 12 1   HEMATOCRIT % 36 1* 35 7*   PLATELETS Thousands/uL 162 168         Results from last 7 days   Lab Units 02/22/23  0503   SODIUM mmol/L 137   POTASSIUM mmol/L 4 4   CHLORIDE mmol/L 106   CO2 mmol/L 26   ANION GAP mmol/L 5   BUN mg/dL 17   CREATININE mg/dL 1 07   EGFR ml/min/1 73sq m 61   CALCIUM mg/dL 8 8     Results from last 7 days   Lab Units 02/22/23  0503   AST U/L 19   ALT U/L 15   ALK PHOS U/L 43   TOTAL PROTEIN g/dL 5 5*   ALBUMIN g/dL 3 2*   TOTAL BILIRUBIN mg/dL 0 72         Results from last 7 days   Lab Units 02/22/23  0503   GLUCOSE RANDOM mg/dL 95     Results from last 7 days   Lab Units 02/22/23  0952 02/22/23  0142 02/21/23  1852   PROTIME seconds  --   --  15 4*   INR   --   --  1 22*   PTT seconds 70* 98* 31     Results from last 7 days   Lab Units 02/22/23  0503   TSH 3RD GENERATON uIU/mL 3 199         Results from last 7 days   Lab Units 02/21/23  1154   INFLUENZA A PCR  Negative   INFLUENZA B PCR  Negative   RSV PCR  Negative       Past Medical History:   Diagnosis Date   • BPH (benign prostatic hyperplasia)    • GERD (gastroesophageal reflux disease)    • Hyperlipidemia    • Hypertension    • Hypothyroidism    • Kidney stone    • Peripheral vascular disease ([facility] [address] )    • Walker as ambulation aid      Present on Admission:  • Gastro-esophageal reflux disease without esophagitis  • Hyperlipidemia  • Hypothyroidism  • Essential (primary) hypertension  • New onset atrial fibrillation (HCC)      Admitting Diagnosis: Urinary retention [R33 9]  Age/Sex: 80 y o  male  Admission Orders:  Scheduled Medications:  diltiazem, 120 mg, Oral, Daily  levothyroxine, 125 mcg, Oral, Early Morning  pantoprazole, 40 mg, Oral, Early Morning  pravastatin, 40 mg, Oral, Daily With Dinner  tamsulosin, 0 4 mg, Oral, Daily With Dinner  torsemide, 10 mg, Oral, Daily      Continuous IV Infusions:  heparin (porcine), 3-20 Units/kg/hr (Order-Specific), Intravenous, Titrated  Cardizem drip - d/c 2/22 AM  IV LR @ 125 ml/hr - d/c 2/21 PM    PRN Meds:  acetaminophen, 650 mg, Oral, Q6H PRN  heparin (porcine), 2,000 Units, Intravenous, Q6H PRN  heparin (porcine), 4,000 Units, Intravenous, Q6H PRN  ondansetron, 4 mg, Intravenous, Q4H PRN    Tele  OOB   Heparin drip  Regular diet   Echo  IP CONSULT TO CARDIOLOGY    Network Utilization Review Department  ATTENTION: Please call with any questions or concerns to [phone] and carefully listen to the prompts so that you are directed to the right person  All voicemails are confidential   Pho[place] Maguire all requests for admission clinical reviews, approved or denied determinations and any other requests to dedicated fax number below belonging to the campus where the patient is receiving treatment   List of dedicated fax numbers for the Facilities:  [address] DENIALS (Administrative/Medical Necessity) [phone]   [address] (Maternity/NICU/Pediatrics) [phone]   [address] [phone]   [facility] [phone]   [facility] [phone]   [address]  [phone]   187 [facility] 310 [phone]   [address] [phone]

## 2023-02-22 NOTE — PROGRESS NOTES
[address] [facility]  Progress Note - Emily Vu 1934, 80 y o  male MRN: [id number]  Unit/Bed#: E4 -01 Encounter: [id number]  Primary Care Provider: Heaven Gordon MD   Date and time admitted to hospital: 2/21/2023 11:29 AM    * New onset atrial fibrillation [facility])  Assessment & Plan  · With history of hypertension, hyperlipidemia, and hypothyroidism presented for elective TURP and possible suprapubic tube placement 2/21/2023 but was found to have rapid atrial fibrillation in OR holding  Patient asymptomatic  · Patient ready seen by cardiology, started on diltiazem gtt, short acting oral diltiazem and heparin infusion  · With chronic pedal edema, torsemide 10 mg daily started on admission as patient reported noncompliance  · TSH within normal limits  · Echo with LVEF 11%, normal systolic function, C5FC  · Patient converted to normal sinus rhythm this AM   Cardizem gtt D/C'd, patient started on long acting Cardizem 120 mg 24 hr tablet  · Continues on heparin drip, pending urology input in regards to rescheduling TURP with temporary suprapubic catheter  Can be done here, will keep patient on heparin drip until then  If not may elect to keep on Lovenox in the short-term and stop prior to surgery with DOAC after       Essential (primary) hypertension  Assessment & Plan  · Renal sufficiency noted on last blood work, Cr WNL today  · Holding Losartan - patient receiving Cardizem 120 mg 24 hr tablet and torsemide 10 mg daily  · Monitor pressures    Gastro-esophageal reflux disease without esophagitis  Assessment & Plan  · Continue PPI    Hyperlipidemia  Assessment & Plan  · On PTA simvastatin, continue pravastatin    Peripheral vascular disease (HCC)  Assessment & Plan  · Continue statin    Hypothyroidism  Assessment & Plan  · Continue levothyroxine 125 mcg daily    BPH (benign prostatic hyperplasia)  Assessment & Plan  · Awaiting potential schedule for TURP and possible SPT placement  · Continue tamsulosin    VTE Pharmacologic Prophylaxis: VTE Score: 3 Moderate Risk (Score 3-4) - Pharmacological DVT Prophylaxis Ordered: heparin drip  Patient Centered Rounds: I performed bedside rounds with nursing staff today  Discussions with Specialists or Other Care Team Provider: Cardiology    Education and Discussions with Family / Patient: Updated  (daughter) via phone  Total Time Spent on Date of Encounter in care of patient: 45 minutes This time was spent on one or more of the following: performing physical exam; counseling and coordination of care; obtaining or reviewing history; documenting in the medical record; reviewing/ordering tests, medications or procedures; communicating with other healthcare professionals and discussing with patient's family/caregivers  Current Length of Stay: 1 day(s)  Current Patient Status: Inpatient   Certification Statement: The patient will continue to require additional inpatient hospital stay due to pending urology procedure  Discharge Plan: pending urology / cardiology recommendations    Code Status: Level 1 - Full Code    Subjective:   Patient seen and examined  Reports he is feeling well and without chest pain, heart palpitations, or shortness of breath  He has some lower foot swelling which he reports is chronic  Has an okay appetite and would like to try to eat  Denies abdominal pain, nausea or vomiting  Objective:     Vitals:   Temp (24hrs), Av 7 °F (36 5 °C), Min:97 1 °F (36 2 °C), Max:98 °F (36 7 °C)    Temp:  [97 1 °F (36 2 °C)-98 °F (36 7 °C)] 97 5 °F (36 4 °C)  HR:  [] 82  Resp:  [12-22] 17  BP: (100-138)/(49-73) 115/70  SpO2:  [95 %-98 %] 98 %  Body mass index is 22 92 kg/m²  Input and Output Summary (last 24 hours):      Intake/Output Summary (Last 24 hours) at 2023 1353  Last data filed at 2023 1515  Gross per 24 hour   Intake 1200 ml   Output --   Net 1200 ml       Physical Exam:   Physical Exam  Vitals and nursing note reviewed  Constitutional:       General: He is not in acute distress  Appearance: Normal appearance  He is well-developed  He is not ill-appearing  HENT:      Head: Normocephalic and atraumatic  Eyes:      Extraocular Movements: Extraocular movements intact  Conjunctiva/sclera: Conjunctivae normal    Cardiovascular:      Rate and Rhythm: Normal rate and regular rhythm  Heart sounds: Normal heart sounds  No murmur heard  Pulmonary:      Effort: Pulmonary effort is normal  No respiratory distress  Breath sounds: Normal breath sounds  No wheezing or rales  Abdominal:      General: Bowel sounds are normal       Palpations: Abdomen is soft  Tenderness: There is no abdominal tenderness  Musculoskeletal:      Right lower leg: Edema present  Left lower leg: Edema present  Comments: Pedal edema b/l   Skin:     General: Skin is warm and dry  Neurological:      Mental Status: He is alert  Psychiatric:         Mood and Affect: Mood normal        Additional Data:     Labs:  Results from last 7 days   Lab Units 02/22/23  0503   WBC Thousand/uL 5 86   HEMOGLOBIN g/dL 12 2   HEMATOCRIT % 36 1*   PLATELETS Thousands/uL 162     Results from last 7 days   Lab Units 02/22/23  0503   SODIUM mmol/L 137   POTASSIUM mmol/L 4 4   CHLORIDE mmol/L 106   CO2 mmol/L 26   BUN mg/dL 17   CREATININE mg/dL 1 07   ANION GAP mmol/L 5   CALCIUM mg/dL 8 8   ALBUMIN g/dL 3 2*   TOTAL BILIRUBIN mg/dL 0 72   ALK PHOS U/L 43   ALT U/L 15   AST U/L 19   GLUCOSE RANDOM mg/dL 95     Results from last 7 days   Lab Units 02/21/23  1852   INR  1 22*     Lines/Drains:  Invasive Devices     Peripheral Intravenous Line  Duration           Peripheral IV 02/21/23 Dorsal (posterior); Right Forearm 1 day    Peripheral IV 02/21/23 Left;Upper;Ventral (anterior) Arm <1 day    Peripheral IV 02/21/23 Right;Upper;Ventral (anterior) Arm <1 day          Drain  Duration           Urethral Catheter Latex 18 Fr  126 days    Urethral Catheter Non-latex 16 Fr  <1 day              Urinary Catheter:  Goal for removal: Per urology procedure, pending    Telemetry:  Telemetry Orders (From admission, onward)             48 Hour Telemetry Monitoring  Continuous x 48 hours        References:    Telemetry Guidelines   Question:  Reason for 48 Hour Telemetry  Answer:  Arrhythmias Requiring Medical Therapy (eg  SVT, Vtach/fib, Bradycardia, Uncontrolled A-fib)                 Telemetry Reviewed: Normal Sinus Rhythm  Indication for Continued Telemetry Use: Arrthymias requiring medical therapy    Imaging: Reviewed radiology reports from this admission including: ECHO    Recent Cultures (last 7 days):     Last 24 Hours Medication List:   Current Facility-Administered Medications   Medication Dose Route Frequency Provider Last Rate   • acetaminophen  650 mg Oral Q6H PRN Leeann Gordon DO     • diltiazem  120 mg Oral Daily Tierra Carrasco DO     • heparin (porcine)  3-20 Units/kg/hr (Order-Specific) Intravenous Titrated Tierra Carrasco DO 10 Units/kg/hr (02/22/23 1030)   • heparin (porcine)  2,000 Units Intravenous Q6H PRN Tierra Carrasco DO     • heparin (porcine)  4,000 Units Intravenous Q6H PRN Tierra Carrasco DO     • levothyroxine  125 mcg Oral Early Morning Keo Loomis, DO     • ondansetron  4 mg Intravenous Q4H PRN Leeann Gordon DO     • pantoprazole  40 mg Oral Early Morning Keo Loomis DO     • pravastatin  40 mg Oral Daily With Motwin, DO     • tamsulosin  0 4 mg Oral Daily With Motwin, DO     • torsemide  10 mg Oral Daily Leeann Gordon DO          Today, Patient Was Seen By: Camryn Real PA-C    **Please Note: This note may have been constructed using a voice recognition system  **

## 2023-02-22 NOTE — ASSESSMENT & PLAN NOTE
Background: History of hypertension hyperlipidemia and hypothyroidism presents for elective TURP and possible suprapubic tube placement but was found to have rapid atrial fibrillation in OR holding  · Patient ready seen by cardiology  Started on diltiazem infusion and oral diltiazem and heparin infusion  · Check TSH in a m  Check echocardiogram   · Appears to have lower extremity swelling    Restart torsemide as patient has not been taking 10 mg daily as directed

## 2023-02-22 NOTE — ASSESSMENT & PLAN NOTE
· Renal sufficiency noted on last blood work, Cr WNL today  · Holding Losartan - patient receiving Cardizem 120 mg 24 hr tablet and torsemide 10 mg daily  · Monitor pressures

## 2023-02-22 NOTE — ANESTHESIA PREPROCEDURE EVALUATION
Procedure:  (TURP), SPT insertion (Urethra)    Relevant Problems   CARDIO  2023    Left Ventricle: Left ventricular cavity size is normal  Wall thickness is normal  The left ventricular ejection fraction is 60%  Systolic function is normal  Wall motion is normal  Diastolic function is mildly abnormal, consistent with grade I (abnormal) relaxation  •  Right Ventricle: Right ventricular cavity size is moderately dilated  •  Right Atrium: The atrium is mildly dilated  •  Aortic Valve: The aortic valve has no significant stenosis  •  Mitral Valve: There is mild regurgitation  •  Tricuspid Valve: There is mild to moderate regurgitation  The right ventricular systolic pressure is mildly elevated  The estimated right ventricular systolic pressure is 51 21 mmHg         (+) Essential (primary) hypertension   (+) Hyperlipidemia   (+) New onset atrial fibrillation (HCC)      ENDO   (+) Hypothyroidism      GI/HEPATIC   (+) Gastro-esophageal reflux disease without esophagitis      /RENAL   (+) BPH (benign prostatic hyperplasia)   (+) Hydronephrosis with urinary obstruction due to renal calculus      NEURO/PSYCH   (+) History of fracture of right hip   (+) Status post fall        Physical Exam    Airway    Mallampati score: II  TM Distance: >3 FB  Neck ROM: full     Dental   Comment: Upper partial out,     Cardiovascular  Rhythm: regular, Rate: normal,     Pulmonary  Breath sounds clear to auscultation,     Other Findings        Anesthesia Plan  ASA Score- 3     Anesthesia Type- general with ASA Monitors  Additional Monitors:   Airway Plan: LMA  Comment: S/p Right MIK, patient has difficult positioning in right hip and discussed positioning re-induction  Plan Factors-Exercise tolerance (METS): >4 METS  Chart reviewed  Patient is not a current smoker  Obstructive sleep apnea risk education given perioperatively  Induction- intravenous  Postoperative Plan- Plan for postoperative opioid use  Informed Consent- Anesthetic plan and risks discussed with patient

## 2023-02-22 NOTE — PHYSICAL THERAPY NOTE
PT EVALUATION    Pt  Name: Kenya Oneal  Pt  Age: 80 y o  MRN: [id number]  LENGTH OF STAY: 1      Admitting Diagnoses:   Urinary retention [R33 9]    Past Medical History:   Diagnosis Date    BPH (benign prostatic hyperplasia)     GERD (gastroesophageal reflux disease)     Hyperlipidemia     Hypertension     Hypothyroidism     Kidney stone     Peripheral vascular disease ([address] )     Reda Dotter as ambulation aid        Past Surgical History:   Procedure Laterality Date    COLONOSCOPY      FL RETROGRADE PYELOGRAM  10/18/2022    JOINT REPLACEMENT Right 10/2022    hip    [place] CYSTO BLADDER W/URETERAL CATHETERIZATION Left 10/18/2022    Procedure: CYSTOSCOPY RETROGRADE PYELOGRAM WITH INTERPRETATION AND WITH INSERTION STENT URETERAL;  Surgeon: Chela Yancey MD;  Location:  MAIN OR;  Service: Urology    [place] CYSTO W/INSERT URETERAL STENT Left 11/15/2022    Procedure: EXCHANGE STENT URETERAL;  Surgeon: Eryn Soria MD;  Location: [address] [place] [address] OR;  Service: Urology    [place] CYSTO/URETERO W/LITHOTRIPSY &INDWELL STENT INSRT Left 11/15/2022    Procedure: CYSTOSCOPY URETEROSCOPY WITH LITHOTRIPSY HOLMIUM LASER, RETROGRADE PYELOGRAM AND INSERTION STENT URETERAL;  Surgeon: Eryn Soria MD;  Location: [address] [place] [address] OR;  Service: Urology       Imaging Studies:  No orders to display         02/22/23 1203   PT Last Visit   PT Visit Date 02/22/23   Note Type   Note type Evaluation   Pain Assessment   Pain Score 4   Pain Location/Orientation Location: [address] [place] [address] Pain Intervention(s) Repositioned; Ambulation/increased activity; Emotional support; Rest   Restrictions/Precautions   RLE Weight Bearing Per Order WBAT  (as per most recent ortho note in 12/9/22; h/o R hip hemiarthroplasty 10/8/22)   Other Precautions Chair Alarm; Bed Alarm;Multiple lines;Telemetry; Fall Risk;Pain;Hard of hearing   Home Living   Type of [address] [place] [address] Av One level;Performs ADLs on one level;Stairs to enter without rails  (2STE; sleeps in recliner/lift chair)   Bathroom Shower/Tub Walk-in shower   Bathroom Toilet Standard   Bathroom Equipment Grab bars in shower; Shower chair; Toilet raiser   Home Equipment Walker;Cane   Prior Function   Level of [place] Independent with functional mobility; Independent with ADLs  (w/ RW; requires assist w/ LB ADL's)   Lives With Spouse  (who has cognitive deficits per pt)   Receives Help From Family;Personal care attendant  (pt reports has 24/7 live in caregivers)   Falls in the last 6 months 1 to 4  (1x)   Vocational Retired   Comments (-) ; family provides transportation   General   Additional Pertinent History h/o R hip hemiarthroplasty on 10/8/22   Family/Caregiver Present No   Cognition   Overall Cognitive Status WFL   Arousal/Participation Alert   Attention Within functional limits   Orientation Level Oriented X4   Following Commands Follows one step commands without difficulty   Comments pleasant & cooperative   Subjective   Subjective Pt agreeable to PT/OT evals  RUE Assessment   RUE Assessment   (refer to OT)   LUE Assessment   LUE Assessment   (refer to OT)   RLE Assessment   RLE Assessment WFL  (4/5 grossly)   LLE Assessment   LLE Assessment WFL  (4/5 grossly)   Coordination   Movements are Fluid and Coordinated 1   Sensation WFL   Bed Mobility   Supine to Sit 5  Supervision   Additional items HOB elevated; Bedrails; Increased time required;Verbal cues   Additional Comments cues for techniques & safety   Transfers   Sit to Stand 5  Supervision   Additional items Increased time required;Verbal cues   Stand to Sit 5  Supervision   Additional items Increased time required;Verbal cues   Additional Comments cues for techniques & safety   Ambulation/Elevation   Gait pattern Wide SANDRO; Decreased foot clearance; Excessively slow; Forward Flexion   Gait Assistance 4  Minimal assist  (CGAx1)   Additional items Assist x 1;Verbal cues; Tactile cues   Assistive Device Rolling walker   Distance 200'x1 Ambulation/Elevation Additional Comments no gross LOB noted; require cues to stay within RW SANDRO especially during turns; pt notes R hip stiffness, otherwise baseline gait   Balance   Static Sitting Fair +   Dynamic Sitting Fair   Static Standing Fair   Dynamic Standing Fair -   Ambulatory Poor +   Endurance Deficit   Endurance Deficit No   Activity Tolerance   Activity Tolerance Patient tolerated treatment well   Medical Staff [id number]   Nurse Made Aware GUADALUPE Damon   Assessment   Prognosis Good   Problem List Decreased strength;Decreased endurance; Impaired balance;Decreased mobility;Pain   Assessment Pt  88 y o male who presented for a scheduled TURP however noted to be in afib  Pt admitted for New onset atrial fibrillation ([address] ) w/ RVR &  Urinary retention (R33 9)    Pt referred to PT for mobility assessment & D/C planning w/ orders of up & OOB as tolerated  Please see above for information re: home set-up & PLOF as well as objective findings during PT assessment  PTA, pt reports being fairly I w/ RW  On eval, pt functioning below baseline hence will continue skilled PT to improve function & safety  Pt require S for bed mobility & transfers; CGAx1 for amb w/ RW + cues for techniques & safety  Gait deviations as above, slow w/ dec foot clearance but no gross LOB noted  Require cues to stay within RW SANDRO  The patient's AM-PAC Basic Mobility Inpatient Short Form Raw Score is 19  A Raw score of greater than 16 suggests the patient may benefit from discharge to home  Please also refer to the recommendation of the Physical Therapist for safe discharge planning  From PT standpoint, will anticipate safe return to home w/ family support when medically cleared  Will recommend HHPT & previous home services at home  No SOB & dizziness reported t/o session   Nsg staff most recent vital signs as follows: /70 (BP Location: Left arm)   Pulse 82   Temp 97 5 °F (36 4 °C) (Temporal)   Resp 17   Ht 6' (1 829 m) Wt 76 7 kg (169 lb)   SpO2 98%   BMI 22 92 kg/m²   At end of session, pt OOB in chair in stable condition, call bell & phone in reach, chair alarm activated, all lines intact  Fall precautions reinforced w/ good understanding  CM to follow  Nsg staff to continue to mobilized pt (OOB in chair for all meals & ambulate in room/unit) as tolerated to prevent further decline in function  Nsg notified  Co-eval was necessary to complete this PT eval for the pt's best interest given pt's medical acuity & complexity  Goals   Patient Goals to go home soon   STG Expiration Date 03/04/23   Short Term Goal #1 Goals to be met in 10 days; pt will be able to: 1) inc strength & balance by 1/2 grade to improve overall functional mobility & dec fall risk; 2) inc bed mobility to modified I for pt to be able to get in/OOB safely w/ proper techniques 100% of the time, to dec caregiver burden & safely function at home; 3) inc transfers to modified I for pt to transition safely from one surface to another w/o % of the time, to dec caregiver burden & safely function at home; 4) inc amb w/ RW approx  >350' w/ S for pt to ambulate community distances w/o any % of the time, to dec caregiver burden & safely function at home; 5) negotiate stairs w/ S for inc safety during stair mgt inside/outside of home & dec caregiver burden; 6) pt/caregiver ed   PT Treatment Day 0   Plan   Treatment/Interventions Functional transfer training;LE strengthening/ROM; Elevations; Therapeutic exercise; Endurance training;Patient/family training;Bed mobility;Gait training;Spoke to nursing;OT   PT Frequency 3-5x/wk   Recommendation   PT Discharge Recommendation Home with home health rehabilitation   Equipment Recommended Bela Waters  (pt has)   Additional Comments Restorative Amb in unit   AM-PAC Basic Mobility Inpatient   Turning in Flat Bed Without Bedrails 4   Lying on Back to Sitting on Edge of Flat Bed Without Bedrails 3   Moving Bed to Chair 3 Standing Up From Chair Using Arms 3   Walk in Room 3   Climb 3-5 Stairs With Railing 3   Basic Mobility Inpatient Raw Score 19   Basic Mobility Standardized Score 42 48   Highest Level Of Mobility   -HL Goal 6: Walk 10 steps or more   -HL Achieved 7: Walk 25 feet or more   End of Consult   Patient Position at End of Consult Bedside chair;Bed/Chair alarm activated; All needs within reach   End of Consult Comments Pt in stable condition  All needs in reach  All lines intact  Chair alarm activated     Hx/personal factors: co-morbidities, inaccessible home, advanced age, mutliple lines, telemetry, use of AD, pain, h/o of falls, fall risk, and assist w/ ADL's  Examination: dec mobility, dec balance, dec endurance, dec amb, risk for falls, pain  Clinical: unpredictable (ongoing medical status, abnormal lab values, risk for falls, and pain mgt)  Complexity: high    [employer]

## 2023-02-22 NOTE — PROGRESS NOTES
Progress Note - Urology  Victorina Du 1934, 80 y o  male MRN: [id number]    Unit/Bed#: E4 -01 Encounter: [id number]      Assessment & Plan  BPH with urinary retention - kelly dependent  Reschedule TURP-SPT for tomorrow Thursday 2/23/23 @  @ [facility] with Dr Leandra Mesa  NPO after midnight tonight  Will need to hold heparin gtt at least six hrs preop  Afib with RVR- asmptomatic resolved  Now in NSR  On heparin gtt  Appreciate medicine and cardiology input  They will plan to transition to oral anticoagulation after his urologic procedure when ready    Subjective: was for elective TURP-SPT yesterday but case cancelled due to Afib with RVR preop  Now back in NSR after cardizem  On heparin gtt  Holding oral anticoagulation until determination for return to OR for urology  He has no current complaints    He is hopeful to have OR for TURP this hospital stay  Review of Systems   Constitutional: Negative  Negative for diaphoresis, fatigue and fever  Respiratory: Negative  Negative for cough and shortness of breath  Cardiovascular: Negative  Negative for chest pain, palpitations and leg swelling  Genitourinary: Positive for difficulty urinating  Negative for decreased urine volume, dysuria, flank pain, frequency, hematuria and urgency  Musculoskeletal: Negative  Neurological: Negative for tremors, syncope and weakness  Objective:  Vitals: Blood pressure 115/70, pulse 82, temperature 97 5 °F (36 4 °C), temperature source Temporal, resp  rate 17, height 6' (1 829 m), weight 76 7 kg (169 lb), SpO2 98 %  ,Body mass index is 22 92 kg/m²  Intake/Output Summary (Last 24 hours) at 2/22/2023 1405  Last data filed at 2/21/2023 1515  Gross per 24 hour   Intake 1200 ml   Output --   Net 1200 ml     Invasive Devices     Peripheral Intravenous Line  Duration           Peripheral IV 02/21/23 Dorsal (posterior); Right Forearm 1 day    Peripheral IV 02/21/23 Left;Upper;Ventral (anterior) Arm <1 day Peripheral IV 02/21/23 Right;Upper;Ventral (anterior) Arm <1 day          Drain  Duration           Urethral Catheter Latex 18 Fr  126 days    Urethral Catheter Non-latex 16 Fr  <1 day                Physical Exam  Vitals and nursing note reviewed  Constitutional:       General: He is not in acute distress  Appearance: Normal appearance  He is well-developed  He is not diaphoretic  HENT:      Head: Normocephalic and atraumatic  Cardiovascular:      Rate and Rhythm: Normal rate and regular rhythm  Pulses: Normal pulses  Heart sounds: Normal heart sounds  Pulmonary:      Effort: Pulmonary effort is normal       Comments: No cough or audible wheeze  Abdominal:      General: There is no distension  Tenderness: There is no abdominal tenderness  Genitourinary:     Comments: White per urethra draining clear yellow urine  Musculoskeletal:      Right lower leg: No edema  Left lower leg: No edema  Skin:     General: Skin is warm and dry  Neurological:      Mental Status: He is alert and oriented to person, place, and time     Psychiatric:         Speech: Speech normal          Behavior: Behavior normal          Labs:  Recent Labs     02/21/23 1852 02/22/23  0503   WBC 5 12 5 86     Recent Labs     02/21/23 1852 02/22/23  0503   HGB 12 1 12 2     Recent Labs     02/22/23  0503   CREATININE 1 07       History:  Past Medical History:   Diagnosis Date   • BPH (benign prostatic hyperplasia)    • GERD (gastroesophageal reflux disease)    • Hyperlipidemia    • Hypertension    • Hypothyroidism    • Kidney stone    • Peripheral vascular disease [address] [address] )    • Walker as ambulation aid      Past Surgical History:   Procedure Laterality Date   • COLONOSCOPY     • FL RETROGRADE PYELOGRAM  10/18/2022   • JOINT REPLACEMENT Right 10/2022    hip   • [place] CYSTO BLADDER W/URETERAL CATHETERIZATION Left 10/18/2022    Procedure: CYSTOSCOPY RETROGRADE PYELOGRAM WITH INTERPRETATION AND WITH INSERTION STENT URETERAL; Surgeon: Isa Car MD;  Location:  MAIN OR;  Service: Urology   • LA CYSTO W/INSERT URETERAL STENT Left 11/15/2022    Procedure: EXCHANGE STENT URETERAL;  Surgeon: Umer Schneider MD;  Location: [facility] MAIN OR;  Service: Urology   • LA CYSTO/URETERO W/LITHOTRIPSY &INDWELL STENT INSRT Left 11/15/2022    Procedure: CYSTOSCOPY URETEROSCOPY WITH LITHOTRIPSY HOLMIUM LASER, RETROGRADE PYELOGRAM AND INSERTION STENT URETERAL;  Surgeon: Umer Schneider MD;  Location: [facility] MAIN OR;  Service: Urology     History reviewed  No pertinent family history  Social History     Socioeconomic History   • Marital status: /Civil Union     Spouse name: None   • Number of children: None   • Years of education: None   • Highest education level: None   Occupational History   • None   Tobacco Use   • Smoking status: Former     Types: Cigars, Pipe     Quit date: 1970     Years since quittin 1   • Smokeless tobacco: Never   Vaping Use   • Vaping Use: Never used   Substance and Sexual Activity   • Alcohol use: Not Currently   • Drug use: Never   • Sexual activity: Not Currently   Other Topics Concern   • None   Social History Narrative   • None     Social Determinants of Health     Financial Resource Strain: Not on file   Food Insecurity: No Food Insecurity   • Worried About Running Out of Food in the Last Year: Never true   • Ran Out of Food in the Last Year: Never true   Transportation Needs: No Transportation Needs   • Lack of Transportation (Medical): No   • Lack of Transportation (Non-Medical):  No   Physical Activity: Not on file   Stress: Not on file   Social Connections: Not on file   Intimate Partner Violence: Not on file   Housing Stability: Low Risk    • Unable to Pay for Housing in the Last Year: No   • Number of Places Lived in the Last Year: 1   • Unstable Housing in the Last Year: No         Flor Sosa  Date: 2023 Time: 2:05 PM

## 2023-02-22 NOTE — TELEPHONE ENCOUNTER
Dr Jyae Coronel patient for planned TURP-SPT yesterday for urinary retention by Santino Juarez  New onset afib during preop holding  Case cancelled  New White placed  Admitted for cardiology workup  Can cancel 2/27 nurse visit  Please schedule followup with Dr Jaye Coronel in few weeks to discuss when ready to try again      Dr Santino Juarez & Dr Jaye Coronel, please weigh in if you had other plans for reattempting his TURP during this hospitalization as he has auto-converted back to sinus rhythm, he seemed to think he having surgery done today/tomorrow so when I saw him today  Otherwise will keep as cancelled, above

## 2023-02-22 NOTE — PLAN OF CARE
Problem: PAIN - ADULT  Goal: Verbalizes/displays adequate comfort level or baseline comfort level  Description: Interventions:  - Encourage patient to monitor pain and request assistance  - Assess pain using appropriate pain scale  - Administer analgesics based on type and severity of pain and evaluate response  - Implement non-pharmacological measures as appropriate and evaluate response  - Consider cultural and social influences on pain and pain management  - Notify physician/advanced practitioner if interventions unsuccessful or patient reports new pain  Outcome: Progressing     Problem: INFECTION - ADULT  Goal: Absence or prevention of progression during hospitalization  Description: INTERVENTIONS:  - Assess and monitor for signs and symptoms of infection  - Monitor lab/diagnostic results  - Monitor all insertion sites, i e  indwelling lines, tubes, and drains  - Monitor endotracheal if appropriate and nasal secretions for changes in amount and color  - [place] appropriate cooling/warming therapies per order  - Administer medications as ordered  - Instruct and encourage patient and family to use good hand hygiene technique  - Identify and instruct in appropriate isolation precautions for identified infection/condition  Outcome: Progressing  Goal: Absence of fever/infection during neutropenic period  Description: INTERVENTIONS:  - Monitor WBC    Outcome: Progressing     Problem: SAFETY ADULT  Goal: Patient will remain free of falls  Description: INTERVENTIONS:  - Educate patient/family on patient safety including physical limitations  - Instruct patient to call for assistance with activity   - Consult OT/PT to assist with strengthening/mobility   - Keep Call bell within reach  - Keep bed low and locked with side rails adjusted as appropriate  - Keep care items and personal belongings within reach  - Initiate and maintain comfort rounds  - Make Fall Risk Sign visible to staff  - Offer Toileting every 2 Hours, in advance of need  - Initiate/Maintain bed alarm  - Obtain necessary fall risk management equipment: alarm  - Apply yellow socks and bracelet for high fall risk patients  - Consider moving patient to room near nurses station  Outcome: Progressing  Goal: Maintain or return to baseline ADL function  Description: INTERVENTIONS:  -  Assess patient's ability to carry out ADLs; assess patient's baseline for ADL function and identify physical deficits which impact ability to perform ADLs (bathing, care of mouth/teeth, toileting, grooming, dressing, etc )  - Assess/evaluate cause of self-care deficits   - Assess range of motion  - Assess patient's mobility; develop plan if impaired  - Assess patient's need for assistive devices and provide as appropriate  - Encourage maximum independence but intervene and supervise when necessary  - Involve family in performance of ADLs  - Assess for home care needs following discharge   - Consider OT consult to assist with ADL evaluation and planning for discharge  - Provide patient education as appropriate  Outcome: Progressing  Goal: Maintains/Returns to pre admission functional level  Description: INTERVENTIONS:  - Perform BMAT or MOVE assessment daily    - Set and communicate daily mobility goal to care team and patient/family/caregiver  - Collaborate with rehabilitation services on mobility goals if consulted  - Perform Range of Motion 3 times a day  - Reposition patient every 3 hours    - Dangle patient 3 times a day  - Stand patient 3 times a day  - Ambulate patient 3times a day  - Out of bed to chair 3 times a day   - Out of bed for meals 3 times a day  - Out of bed for toileting  - Record patient progress and toleration of activity level   Outcome: Progressing     Problem: DISCHARGE PLANNING  Goal: Discharge to home or other facility with appropriate resources  Description: INTERVENTIONS:  - Identify barriers to discharge w/patient and caregiver  - Arrange for needed discharge resources and transportation as appropriate  - Identify discharge learning needs (meds, wound care, etc )  - Arrange for interpretive services to assist at discharge as needed  - Refer to Case Management Department for coordinating discharge planning if the patient needs post-hospital services based on physician/advanced practitioner order or complex needs related to functional status, cognitive ability, or social support system  Outcome: Progressing     Problem: Knowledge Deficit  Goal: Patient/family/caregiver demonstrates understanding of disease process, treatment plan, medications, and discharge instructions  Description: Complete learning assessment and assess knowledge base    Interventions:  - Provide teaching at level of understanding  - Provide teaching via preferred learning methods  Outcome: Progressing

## 2023-02-22 NOTE — ASSESSMENT & PLAN NOTE
· We will schedule for TURP and possible SPT placement  · Both postponed for now due to rapid atrial fibrillation  Continue tamsulosin

## 2023-02-22 NOTE — OCCUPATIONAL THERAPY NOTE
Occupational Therapy Evaluation     Patient Name: Kera Marcos  Today's Date: 2/22/2023  Problem List  Principal Problem:    New onset atrial fibrillation [facility])  Active Problems:    Essential (primary) hypertension    Gastro-esophageal reflux disease without esophagitis    Hyperlipidemia    Hypothyroidism    BPH (benign prostatic hyperplasia)    Peripheral vascular disease ([facility] [address] )    Past Medical History  Past Medical History:   Diagnosis Date    BPH (benign prostatic hyperplasia)     GERD (gastroesophageal reflux disease)     Hyperlipidemia     Hypertension     Hypothyroidism     Kidney stone     Peripheral vascular disease ([facility] [address] )     Gustavo Harps as ambulation aid      Past Surgical History  Past Surgical History:   Procedure Laterality Date    COLONOSCOPY      FL RETROGRADE PYELOGRAM  10/18/2022    JOINT REPLACEMENT Right 10/2022    hip    MI CYSTO BLADDER W/URETERAL CATHETERIZATION Left 10/18/2022    Procedure: CYSTOSCOPY RETROGRADE PYELOGRAM WITH INTERPRETATION AND WITH INSERTION STENT URETERAL;  Surgeon: Marly Donaldson MD;  Location:  MAIN OR;  Service: Urology    MI CYSTO W/INSERT URETERAL STENT Left 11/15/2022    Procedure: EXCHANGE STENT URETERAL;  Surgeon: Sonja Calderon MD;  Location: [facility] MAIN OR;  Service: Urology    MI CYSTO/URETERO W/LITHOTRIPSY &INDWELL STENT INSRT Left 11/15/2022    Procedure: CYSTOSCOPY URETEROSCOPY WITH LITHOTRIPSY HOLMIUM LASER, RETROGRADE PYELOGRAM AND INSERTION STENT URETERAL;  Surgeon: Sonja Calderon MD;  Location: [facility] MAIN OR;  Service: Urology           02/22/23 1225   OT Last Visit   OT Visit Date 02/22/23   Note Type   Note type Evaluation   Pain Assessment   Pain Assessment Tool 0-10   Pain Score 4   Pain Location/Orientation Location: Buttocks   Patient's Stated Pain Goal No pain   Hospital Pain Intervention(s) Repositioned; Ambulation/increased activity; Emotional support; Rest   Multiple Pain Sites No   Restrictions/Precautions   RLE Weight Bearing Per Order   (Hx of R hip fx and s/p R hip hemiarthroplasty on 10/8/22  Most recent ortho notes (12/9/22): Continue WBAT R LE)   Other Precautions Chair Alarm; Bed Alarm;Multiple lines; Fall Risk;Pain;Hard of hearing   Home Living   Type of [address] One level;Stairs to enter without rails  (2 DUARTE)   Bathroom Shower/Tub Walk-in shower   Bathroom Toilet Standard   Bathroom Equipment Grab bars in shower; Shower chair; Toilet raiser   [address] Walker;Cane;Other (Comment)  (Lift chair- sleeps in lift chair at baseline)   Additional Comments Pt lives with spouse in a one level house with 2 DUARTE  Pt reports spouse w/ cognitive deficits, unable to provide assist to pt  Pt and spouse have 24/7 live-in caregivers  Prior Function   Level of [place] Independent with functional mobility; Needs assistance with ADLs; Independent with IADLS  (assist w/ LB ADLs, I w/ UB ADLs and toileting)   Lives With Spouse  (Spouse has cognitive deficits per pt)   Receives Help From Personal care attendant; Other (Comment)  (Pt reports 24/7 live in caregivers)   IADLs Family/Friend/Other provides transportation; Family/Friend/Other provides meals; Family/Friend/Other provides medication management   Falls in the last 6 months 1 to 4  (1)   Vocational Retired   Comments At baseline, pt was I w/ UB ADLs and toileting and required assist w/ LB ADLs and IADLs  Pt reports Mod I for functional transfers/mobility w/ use of RW  (-)   +Fall PTA  Lifestyle   Autonomy At baseline, pt was I w/ UB ADLs and toileting and required assist w/ LB ADLs and IADLs  Pt reports Mod I for functional transfers/mobility w/ use of RW  (-)   +Fall PTA     Reciprocal Relationships Spouse, 2 dtrs, live-in caregivers   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   Intrinsic Gratification Spending time with family   ADL   Where Assessed Chair   Eating Assistance 7  Independent   Grooming Assistance 7  [address] Bathing Assistance 5  Supervision/Setup   LB Bathing Assistance 4  Minimal Assistance   [address] 5  [address] 3  Moderate [address]  4  Minimal Assistance   Functional Assistance 4  Minimal Assistance   Bed Mobility   Supine to Sit 5  Supervision   Additional items HOB elevated; Bedrails; Increased time required;Verbal cues   Sit to Supine Unable to assess   Additional Comments Pt seated OOB in chair with chair alarm activated at end of session  Call bell and phone within reach  All needs met and pt reports no further questions for OT at this time  Transfers   Sit to Stand 5  Supervision   Additional items Bedrails; Increased time required;Verbal cues   Stand to Sit 5  Supervision   Additional items Armrests; Increased time required;Verbal cues   Additional Comments Cues for safe technique and hand placement   Functional Mobility   Functional Mobility 4  Minimal assistance   Additional Comments CGA, Assist x1   Additional items Rolling walker   Balance   Static Sitting Fair +   Dynamic Sitting Fair   Static Standing Fair   Dynamic Standing Fair -   Ambulatory Poor +   Activity Tolerance   Activity Tolerance Patient tolerated treatment well   Medical Staff Made Aware Renny, PT   Nurse Made Aware yes; GUADALUPE Zapata   RUE Assessment   RUE Assessment WFL  (4/5 throughout)   LUE Assessment   LUE Assessment WFL  (4/5 throughout)   Hand Function   Gross Motor Coordination Functional   Fine Motor Coordination Functional   Sensation   Light Touch No apparent deficits   Proprioception   Proprioception No apparent deficits   Vision-Basic Assessment   Current Vision Wears glasses all the time   Vision - Complex Assessment   Ocular Range of Motion Intact   Acuity Able to read clock/calendar on wall without difficulty; Able to read employee name badge without difficulty   Psychosocial   Psychosocial (WDL) WDL   Perception   Inattention/Neglect Appears intact Cognition   Overall Cognitive Status WFL   Arousal/Participation Alert; Cooperative   Attention Within functional limits   Orientation Level Oriented X4   Memory Decreased recall of recent events   Following Commands Follows one step commands with increased time or repetition   Comments Pleasant and cooperative  [place]   Assessment   Limitation Decreased ADL status; Decreased UE strength;Decreased Safe judgement during ADL;Decreased endurance;Decreased self-care trans;Decreased high-level ADLs   Prognosis Good   Assessment Pt is a 80 y o  male seen for OT evaluation s/p adm to [facility] on 2/21/2023 w/ New onset of atrial fibrillation  Pt presented initially for elective TURP and possible suprapubic tube placement  In the OR holding he was noted to be in rapid A-Fib but was asymptomatic  Comorbidities affecting pt’s functional performance include a significant PMH of BPH, HLD, HTN, PVD, and recent R hip fx s/p R hip hemiarthroplasty on 10/8/23  Pt with active OT orders and activity orders for Up and OOB as tolerated   Pt lives with spouse in a one level house with 2 DUARTE  Pt reports spouse w/ cognitive deficits, unable to provide assist to pt  Pt and spouse have 24/7 live-in caregivers  At baseline, pt was I w/ UB ADLs and toileting and required assist w/ LB ADLs and IADLs  Pt reports Mod I for functional transfers/mobility w/ use of RW  (-)   +Fall PTA  Upon evaluation, pt currently requires Supervision for UB ADLs, Mod-Min A for LB ADLs, Min A for toileting, Supervision for bed mobility, Supervision for transfers, and Min A (CGA) for functional mobility 2* the following deficits impacting occupational performance: multiple lines, limited functional reach, decreased strength, decreased balance, decreased tolerance and increased pain   These impairments, as well at pt’s fall risk, difficulty performing transfers/mobility, steps to enter environment and difficulty performing ADLS limit pt’s ability to safely engage in all baseline areas of occupation  Based on the aforementioned OT evaluation, functional performance deficits, and assessments, pt has been identified as a Moderate complexity evaluation  Pt to continue to benefit from continued acute OT services during hospital stay to address defined deficits and to maximize level of functional independence in the following Occupational Performance areas: grooming, bathing/shower, toilet hygiene, dressing, health maintenance, functional mobility, community mobility and clothing management  From OT standpoint, recommend Home OT upon D/C  OT will continue to follow pt 2-3x/wk to address the following goals to  w/in 10-14 days:   Goals   Patient Goals To go home soon   LTG Time Frame 10-   Long Term Goal Please refer to LTGs listed below   Plan   Treatment Interventions ADL retraining;Functional transfer training;UE strengthening/ROM; Endurance training;Patient/family training;Equipment evaluation/education; Compensatory technique education;Continued evaluation; Activityengagement   Goal Expiration Date 23   OT Treatment Day 0   OT Frequency 2-3x/wk   Recommendation   OT Discharge Recommendation Home with home health rehabilitation   Additional Comments  The patient's raw score on the AM-PAC Daily Activity Inpatient Short Form is 19  A raw score of greater than or equal to 19 suggests the patient may benefit from discharge to home  Please refer to the recommendation of the Occupational Therapist for safe discharge planning     AM-PAC Daily Activity Inpatient   Lower Body Dressing 2   Bathing 3   Toileting 3   Upper Body Dressing 3   Grooming 4   Eating 4   Daily Activity Raw Score 19   Daily Activity Standardized Score (Calc for Raw Score >=11) 40 22   AM-[facility] Applied Cognition Inpatient   Following a Speech/Presentation 4   Understanding Ordinary Conversation 4   Taking Medications 3   Remembering Where Things Are Placed or Put Away 4   Remembering List of 4-5 Errands 3   Taking Care of Complicated Tasks 3   Applied Cognition Raw Score 21   Applied Cognition Standardized Score 44 3        GOALS    Pt will improve activity tolerance to G for min 30 min txment sessions for increase engagement in functional tasks    Pt will complete bed mobility at a Mod I level w/ G balance/safety demonstrated to decrease caregiver assistance required     Pt will complete UB dressing/self care w/ mod I using adaptive device and DME as needed     Pt will complete LB dressing/self care w/ Supervision using adaptive device, LH AE, and DME as needed    Pt will complete toileting w/ mod I w/ G hygiene/thoroughness using DME as needed    Pt will improve functional transfers to Mod I on/off all surfaces using DME as needed w/ G balance/safety     Pt will improve functional mobility during ADL/IADL/leisure tasks to Mod I using DME as needed w/ G balance/safety     Pt will be attentive 100% of the time during ongoing cognitive assessment w/ G participation to assist w/ safe d/c planning/recommendations    Pt will demonstrate G carryover of pt/caregiver education and training as appropriate w/o cues w/ good tolerance to increase safety during functional tasks    Pt will increase BUE strength by 1MM grade via AROM exercises to increase independence in ADLs and transfers    Pt will verbalize 3 potential fall hazards and identify appropriate compensatory techniques to decrease fall risk in home environment     Pt will increase standing tolerance to 8-10 mins with Fair+ dynamic standing balance to increase safety during participation in ADLs         Oneyda Jo OTR/L

## 2023-02-23 ENCOUNTER — ANESTHESIA (INPATIENT)
Dept: PERIOP | Facility: HOSPITAL | Age: 88
End: 2023-02-23

## 2023-02-23 PROBLEM — N39.0 URINARY TRACT INFECTION: Status: ACTIVE | Noted: 2023-02-23

## 2023-02-23 LAB
ANION GAP SERPL CALCULATED.3IONS-SCNC: 4 MMOL/L (ref 4–13)
APTT PPP: 131 SECONDS (ref 23–37)
APTT PPP: 143 SECONDS (ref 23–37)
BUN SERPL-MCNC: 16 MG/DL (ref 5–25)
CALCIUM SERPL-MCNC: 8.9 MG/DL (ref 8.4–10.2)
CHLORIDE SERPL-SCNC: 107 MMOL/L (ref 96–108)
CO2 SERPL-SCNC: 26 MMOL/L (ref 21–32)
CREAT SERPL-MCNC: 1.05 MG/DL (ref 0.6–1.3)
ERYTHROCYTE [DISTWIDTH] IN BLOOD BY AUTOMATED COUNT: 13.3 % (ref 11.6–15.1)
GFR SERPL CREATININE-BSD FRML MDRD: 63 ML/MIN/1.73SQ M
GLUCOSE SERPL-MCNC: 103 MG/DL (ref 65–140)
HCT VFR BLD AUTO: 35.7 % (ref 36.5–49.3)
HGB BLD-MCNC: 12.2 G/DL (ref 12–17)
MCH RBC QN AUTO: 33.4 PG (ref 26.8–34.3)
MCHC RBC AUTO-ENTMCNC: 34.2 G/DL (ref 31.4–37.4)
MCV RBC AUTO: 98 FL (ref 82–98)
PLATELET # BLD AUTO: 147 THOUSANDS/UL (ref 149–390)
PMV BLD AUTO: 8.7 FL (ref 8.9–12.7)
POTASSIUM SERPL-SCNC: 4.1 MMOL/L (ref 3.5–5.3)
RBC # BLD AUTO: 3.65 MILLION/UL (ref 3.88–5.62)
SODIUM SERPL-SCNC: 137 MMOL/L (ref 135–147)
WBC # BLD AUTO: 4.82 THOUSAND/UL (ref 4.31–10.16)

## 2023-02-23 PROCEDURE — 99232 SBSQ HOSP IP/OBS MODERATE 35: CPT

## 2023-02-23 PROCEDURE — 99024 POSTOP FOLLOW-UP VISIT: CPT | Performed by: PHYSICIAN ASSISTANT

## 2023-02-23 PROCEDURE — 99232 SBSQ HOSP IP/OBS MODERATE 35: CPT | Performed by: STUDENT IN AN ORGANIZED HEALTH CARE EDUCATION/TRAINING PROGRAM

## 2023-02-23 PROCEDURE — 51102 DRAIN BL W/CATH INSERTION: CPT | Performed by: UROLOGY

## 2023-02-23 PROCEDURE — C2627 CATH, SUPRAPUBIC/CYSTOSCOPIC: HCPCS | Performed by: UROLOGY

## 2023-02-23 PROCEDURE — 0VB08ZZ EXCISION OF PROSTATE, VIA NATURAL OR ARTIFICIAL OPENING ENDOSCOPIC: ICD-10-PCS | Performed by: UROLOGY

## 2023-02-23 PROCEDURE — 80048 BASIC METABOLIC PNL TOTAL CA: CPT

## 2023-02-23 PROCEDURE — 0T9B30Z DRAINAGE OF BLADDER WITH DRAINAGE DEVICE, PERCUTANEOUS APPROACH: ICD-10-PCS | Performed by: UROLOGY

## 2023-02-23 PROCEDURE — 85730 THROMBOPLASTIN TIME PARTIAL: CPT | Performed by: INTERNAL MEDICINE

## 2023-02-23 PROCEDURE — 88305 TISSUE EXAM BY PATHOLOGIST: CPT | Performed by: PATHOLOGY

## 2023-02-23 PROCEDURE — 85027 COMPLETE CBC AUTOMATED: CPT

## 2023-02-23 PROCEDURE — 52601 PROSTATECTOMY (TURP): CPT | Performed by: UROLOGY

## 2023-02-23 RX ORDER — LIDOCAINE HYDROCHLORIDE 20 MG/ML
INJECTION, SOLUTION EPIDURAL; INFILTRATION; INTRACAUDAL; PERINEURAL AS NEEDED
Status: DISCONTINUED | OUTPATIENT
Start: 2023-02-23 | End: 2023-02-23

## 2023-02-23 RX ORDER — FENTANYL CITRATE 50 UG/ML
INJECTION, SOLUTION INTRAMUSCULAR; INTRAVENOUS AS NEEDED
Status: DISCONTINUED | OUTPATIENT
Start: 2023-02-23 | End: 2023-02-23

## 2023-02-23 RX ORDER — ONDANSETRON 2 MG/ML
INJECTION INTRAMUSCULAR; INTRAVENOUS AS NEEDED
Status: DISCONTINUED | OUTPATIENT
Start: 2023-02-23 | End: 2023-02-23

## 2023-02-23 RX ORDER — FENTANYL CITRATE/PF 50 MCG/ML
25 SYRINGE (ML) INJECTION
Status: DISCONTINUED | OUTPATIENT
Start: 2023-02-23 | End: 2023-02-23 | Stop reason: HOSPADM

## 2023-02-23 RX ORDER — ONDANSETRON 2 MG/ML
4 INJECTION INTRAMUSCULAR; INTRAVENOUS EVERY 6 HOURS PRN
Status: DISCONTINUED | OUTPATIENT
Start: 2023-02-23 | End: 2023-02-25 | Stop reason: HOSPADM

## 2023-02-23 RX ORDER — ATROPA BELLADONNA AND OPIUM 16.2; 3 MG/1; MG/1
1 SUPPOSITORY RECTAL EVERY 6 HOURS PRN
Status: DISCONTINUED | OUTPATIENT
Start: 2023-02-23 | End: 2023-02-25 | Stop reason: HOSPADM

## 2023-02-23 RX ORDER — OXYBUTYNIN CHLORIDE 5 MG/1
5 TABLET ORAL EVERY 6 HOURS PRN
Status: DISCONTINUED | OUTPATIENT
Start: 2023-02-23 | End: 2023-02-25 | Stop reason: HOSPADM

## 2023-02-23 RX ORDER — AMOXICILLIN 500 MG/1
500 CAPSULE ORAL EVERY 8 HOURS SCHEDULED
Status: DISCONTINUED | OUTPATIENT
Start: 2023-02-23 | End: 2023-02-23

## 2023-02-23 RX ORDER — ONDANSETRON 2 MG/ML
4 INJECTION INTRAMUSCULAR; INTRAVENOUS ONCE AS NEEDED
Status: DISCONTINUED | OUTPATIENT
Start: 2023-02-23 | End: 2023-02-23 | Stop reason: HOSPADM

## 2023-02-23 RX ORDER — CIPROFLOXACIN 2 MG/ML
400 INJECTION, SOLUTION INTRAVENOUS EVERY 12 HOURS
Status: DISCONTINUED | OUTPATIENT
Start: 2023-02-24 | End: 2023-02-24

## 2023-02-23 RX ORDER — PROPOFOL 10 MG/ML
INJECTION, EMULSION INTRAVENOUS AS NEEDED
Status: DISCONTINUED | OUTPATIENT
Start: 2023-02-23 | End: 2023-02-23

## 2023-02-23 RX ORDER — SODIUM CHLORIDE, SODIUM LACTATE, POTASSIUM CHLORIDE, CALCIUM CHLORIDE 600; 310; 30; 20 MG/100ML; MG/100ML; MG/100ML; MG/100ML
INJECTION, SOLUTION INTRAVENOUS CONTINUOUS PRN
Status: DISCONTINUED | OUTPATIENT
Start: 2023-02-23 | End: 2023-02-23

## 2023-02-23 RX ORDER — SODIUM CHLORIDE, SODIUM LACTATE, POTASSIUM CHLORIDE, CALCIUM CHLORIDE 600; 310; 30; 20 MG/100ML; MG/100ML; MG/100ML; MG/100ML
100 INJECTION, SOLUTION INTRAVENOUS CONTINUOUS
Status: DISCONTINUED | OUTPATIENT
Start: 2023-02-23 | End: 2023-02-24

## 2023-02-23 RX ORDER — ACETAMINOPHEN 325 MG/1
650 TABLET ORAL EVERY 6 HOURS SCHEDULED
Status: DISCONTINUED | OUTPATIENT
Start: 2023-02-23 | End: 2023-02-25 | Stop reason: HOSPADM

## 2023-02-23 RX ORDER — EPHEDRINE SULFATE 50 MG/ML
INJECTION INTRAVENOUS AS NEEDED
Status: DISCONTINUED | OUTPATIENT
Start: 2023-02-23 | End: 2023-02-23

## 2023-02-23 RX ORDER — SORBITOL 30 G/1000ML
IRRIGANT IRRIGATION AS NEEDED
Status: DISCONTINUED | OUTPATIENT
Start: 2023-02-23 | End: 2023-02-23 | Stop reason: HOSPADM

## 2023-02-23 RX ORDER — MAGNESIUM HYDROXIDE 1200 MG/15ML
LIQUID ORAL AS NEEDED
Status: DISCONTINUED | OUTPATIENT
Start: 2023-02-23 | End: 2023-02-23 | Stop reason: HOSPADM

## 2023-02-23 RX ADMIN — LIDOCAINE HYDROCHLORIDE 100 MG: 20 INJECTION, SOLUTION EPIDURAL; INFILTRATION; INTRACAUDAL; PERINEURAL at 16:08

## 2023-02-23 RX ADMIN — CIPROFLOXACIN 400 MG: 2 INJECTION, SOLUTION INTRAVENOUS at 15:55

## 2023-02-23 RX ADMIN — SODIUM CHLORIDE, SODIUM LACTATE, POTASSIUM CHLORIDE, AND CALCIUM CHLORIDE: .6; .31; .03; .02 INJECTION, SOLUTION INTRAVENOUS at 15:50

## 2023-02-23 RX ADMIN — DILTIAZEM HYDROCHLORIDE 120 MG: 120 CAPSULE, COATED, EXTENDED RELEASE ORAL at 08:04

## 2023-02-23 RX ADMIN — ACETAMINOPHEN 325MG 650 MG: 325 TABLET ORAL at 23:10

## 2023-02-23 RX ADMIN — EPHEDRINE SULFATE 5 MG: 50 INJECTION, SOLUTION INTRAVENOUS at 16:30

## 2023-02-23 RX ADMIN — ONDANSETRON 4 MG: 2 INJECTION INTRAMUSCULAR; INTRAVENOUS at 16:42

## 2023-02-23 RX ADMIN — LEVOTHYROXINE SODIUM 125 MCG: 25 TABLET ORAL at 05:59

## 2023-02-23 RX ADMIN — FENTANYL CITRATE 25 MCG: 50 INJECTION INTRAMUSCULAR; INTRAVENOUS at 16:57

## 2023-02-23 RX ADMIN — SODIUM CHLORIDE, SODIUM LACTATE, POTASSIUM CHLORIDE, AND CALCIUM CHLORIDE 100 ML/HR: .6; .31; .03; .02 INJECTION, SOLUTION INTRAVENOUS at 17:26

## 2023-02-23 RX ADMIN — FENTANYL CITRATE 25 MCG: 50 INJECTION INTRAMUSCULAR; INTRAVENOUS at 16:33

## 2023-02-23 RX ADMIN — AMOXICILLIN 500 MG: 500 CAPSULE ORAL at 14:00

## 2023-02-23 RX ADMIN — EPHEDRINE SULFATE 5 MG: 50 INJECTION, SOLUTION INTRAVENOUS at 16:49

## 2023-02-23 RX ADMIN — PROPOFOL 150 MG: 10 INJECTION, EMULSION INTRAVENOUS at 16:08

## 2023-02-23 RX ADMIN — PANTOPRAZOLE SODIUM 40 MG: 40 TABLET, DELAYED RELEASE ORAL at 05:59

## 2023-02-23 NOTE — PROGRESS NOTES
Cardiology Progress Note - Drew Aldana 80 y o  male MRN: [id number]    Unit/Bed#: E4 -01 Encounter: [id number]      Assessment & Plan:    [facility] onset [facility] ([facility] )  -Initially presented with A-fib with RVR, now converted back to sinus rhythm  - Continue with diltiazem 120 mg daily for rate control  - Currently anticoagulated heparin drip, plan to hold this morning for his urological procedure, postprocedure can start patient on NOAC, recommend starting Eliquis 5 mg twice daily    BPH (benign prostatic hyperplasia)  -White dependent currently  - Initially admitted for an elective TURP, however postponed due to new onset atrial fibrillation  - Plan for TURP today    Essential (primary) hypertension  - Continue with diltiazem 120 mg daily and torsemide 10 mg daily  - BP well controlled    Gastro-esophageal reflux disease without esophagitis    Hyperlipidemia  - Continue with pravastatin 40 mg daily    Hypothyroidism    Peripheral vascular disease ([facility] )    Summary:  - Remains in sinus rhythm  -Discontinue heparin drip 6 hours before surgery today  - Recommend starting Eliquis 5 mg twice daily postoperatively once cleared by urology    Subjective:   No significant events overnight  He reports feeling well this morning and has no complaints  Denies chest pain, shortness of breath, orthopnea, abdominal pain, nausea, vomiting, fever, chills, headache, dizziness or palpitations  Objective:     Vitals: Blood pressure 107/57, pulse 68, temperature (!) 97 °F (36 1 °C), temperature source Temporal, resp  rate 18, height 6' (1 829 m), weight 76 7 kg (169 lb), SpO2 97 %  , Body mass index is 22 92 kg/m² ,   Orthostatic Blood Pressures    Flowsheet Row Most Recent Value   Blood Pressure 107/57 filed at 02/23/2023 9656   Patient Position - Orthostatic VS Lying filed at 02/23/2023 0739            Intake/Output Summary (Last 24 hours) at 2/23/2023 0931  Last data filed at 2/23/2023 0601  Gross per 24 hour Intake 297 91 ml   Output 1750 ml   Net -1452 09 ml           Physical Exam:    GEN: Tunde Aguilar appears well, alert and oriented x 3, pleasant and cooperative   HEENT: anicteric, mucous membranes moist  NECK: no jvd, carotid bruits   HEART: regular rate and rhythm, normal S1 and S2, no murmurs, clicks, gallops or rubs   LUNGS: clear to auscultation bilaterally; no wheezes, rales, or rhonchi   ABDOMEN: normal bowel sounds, soft, no tenderness, no distention  EXTREMITIES: peripheral pulses normal; no clubbing, cyanosis, or edema  NEURO: no focal findings   SKIN: normal without suspicious lesions on exposed skin      Current Facility-Administered Medications:   •  acetaminophen (TYLENOL) tablet 650 mg, 650 mg, Oral, Q6H PRN, Palmira , DO  •  ciprofloxacin (CIPRO) IVPB (premix in 5% dextrose) 400 mg 200 mL, 400 mg, Intravenous, Once, Matilde Goodrich PA-C  •  diltiazem (CARDIZEM CD) 24 hr capsule 120 mg, 120 mg, Oral, Daily, Mary Braun DO, 120 mg at 02/23/23 9270  •  heparin (porcine) injection 2,000 Units, 2,000 Units, Intravenous, Q6H PRN, Mary Braun DO, 2,000 Units at 02/22/23 1715  •  heparin (porcine) injection 4,000 Units, 4,000 Units, Intravenous, Q6H PRN, Mary Braun DO  •  levothyroxine tablet 125 mcg, 125 mcg, Oral, Early Morning, Keo Loomis DO, 125 mcg at 02/23/23 0559  •  ondansetron (ZOFRAN) injection 4 mg, 4 mg, Intravenous, Q4H PRN, Palmira Dhaliwaler, DO  •  pantoprazole (PROTONIX) EC tablet 40 mg, 40 mg, Oral, Early Morning, Keo Loomis DO, 40 mg at 02/23/23 0559  •  pravastatin (PRAVACHOL) tablet 40 mg, 40 mg, Oral, Daily With Dinner, Keo Loomis DO, 40 mg at 02/22/23 1712  •  tamsulosin (FLOMAX) capsule 0 4 mg, 0 4 mg, Oral, Daily With Dinner, Keo Loomis DO, 0 4 mg at 02/22/23 1712  •  torsemide (DEMADEX) tablet 10 mg, 10 mg, Oral, Daily, Palmira Díaz DO    Labs & Results:    No results found for: CKTOTAL, CKMB, CKMBINDEX, TROPONINI    Lab Results Component Value Date    CALCIUM 8 9 02/23/2023    K 4 1 02/23/2023    CO2 26 02/23/2023     02/23/2023    BUN 16 02/23/2023    CREATININE 1 05 02/23/2023       Lab Results   Component Value Date    WBC 4 82 02/23/2023    HGB 12 2 02/23/2023    HCT 35 7 (L) 02/23/2023    MCV 98 02/23/2023     (L) 02/23/2023     Results from last 7 days   Lab Units 02/21/23  1852   INR  1 22*       No results found for: CHOL  No results found for: HDL  No results found for: LDLCALC  No results found for: TRIG    Lab Results   Component Value Date    ALT 15 02/22/2023    AST 19 02/22/2023         EKG personally reviewed by )Faviola Lewis MD  No acute changes   TELE: No significant arrhythmias seen on telemetry review

## 2023-02-23 NOTE — ASSESSMENT & PLAN NOTE
· Patient's urine culture growing Enterococcus and Pseudomonas from 2/13/2023  · Currently with indwelling White catheter, asymptomatic, afebrile and without leukocytosis  · Given 5 day course of Ciprofloxacin per urology prior to admission  · Ciprofloxacin IV x 1 30 minutes prior to procedure  · We will add on amoxicillin 500 mg every 8 hours x 5 days given Enterococcus growth and urine culture sensitivites

## 2023-02-23 NOTE — ASSESSMENT & PLAN NOTE
· With history of hypertension, hyperlipidemia, and hypothyroidism presented for elective TURP and possible suprapubic tube placement 2/21/2023 but was found to have rapid atrial fibrillation in OR holding  Patient asymptomatic  · Patient ready seen by cardiology, started on diltiazem gtt, short acting oral diltiazem and heparin infusion  · With chronic pedal edema, torsemide 10 mg daily started on admission as patient reported noncompliance and his PCP stopping medication due to constipation  · TSH within normal limits  · Echo with LVEF 62%, normal systolic function, H3EI  · Patient converted and remains in normal sinus rhythm    Cardizem gtt D/C'd, patient started on long acting Cardizem 120 mg 24 hr tablet  · Heparin gtt held, patient scheduled for TURP-SPT this afternoon at 1530  · Price checking Eliquis with pharmacy as DOAC suggested for patient post-procedure  · Resume anticoagulation once cleared by urology post-procedure

## 2023-02-23 NOTE — OP NOTE
OPERATIVE REPORT  PATIENT NAME: Trevon Quinonez    :  1934  MRN: [id number]  Pt Location: [facility] OR ROOM 04    SURGERY DATE: 2023    Surgeon(s) and Role:     * Ahsan Vick MD - Primary    Preop Diagnosis:  Benign prostatic hyperplasia with urinary retention [N40 1, R33 8]    Post-Op Diagnosis Codes: * Benign prostatic hyperplasia with urinary retention [N40 1, R33 8]    Procedure(s):  (TURP)  SPT insertion  INSERTION SUPRAPUBIC CATHETER PERCUTANEOUS    Specimen(s):  ID Type Source Tests Collected by Time Destination   1 : Prostate Tissue  Tissue Prostate TISSUE EXAM Ahsan Vick MD 2023  4:36 PM        Estimated Blood Loss:   Minimal    Drains:  Urethral Catheter Latex 18 Fr  (Active)   Number of days: 128       Urethral Catheter Latex 22 Fr  (Active)   Site Assessment Clean;Skin intact 23   Collection Container Standard drainage bag 23   Number of days: 0       Suprapubic Catheter 14 Fr  (Active)   Site Assessment MIGUELINA 23   Dressing Status Clean;Dry; Intact 23   Number of days: 0       Continuous Bladder Irrigation Three-way (Active)   Site Assessment Unable to assess 23   Securement Method Tape 23   Number of days: 0       Anesthesia Type:   General    Operative Indications:  Benign prostatic hyperplasia with urinary retention [N40 1, R33 8]      Operative Findings:  Mildly large prostate, bladder outlet obstruction, trabeculated bladder  Complications:   None    Procedure and Technique:      ? The patient was brought into the OR, properly identified and positioned on the table  General anesthesia was induced, and he was prepped using chlorhexidine solution and draped in lithotomy position  The 32 [place] resectoscope sheath was introduced, and inspection confirmed hyperplasia of the prostate and secondary obstructive changes in the bladder      The suprapubic tube was performed first  After distending the bladder to ensure that the peritoneal contents were moved superiorly and not hit by the needle or catheter, a small incision was made on the SP space, and taken down to incise the fascia  A needle was placed thru the incision, thru the anterior abdominal wall, and into the bladder on the anterior wall  Care was taken to ensure the needle did not hit the peritoneal contents  Thru the needle a wire was placed  Over the wire a fascial dilator and then a trocar were placed  Thru the trocar a 14F eklly was placed  It was confirmed by the scope to be in good position, and it was sutured to the skin  The TURP was then performed  Prostate tissue was resected circumferentially from the bladder neck out to the apex, down to intermittent capsular fibers  There were no capsular perforations noted  Pieces of tissue were evacuated using the Urovac bottle type evacuator  Hemostasis was obtained using electrocautery  Final inspection revealed no damage to the ureteral orifices, well resected prostate, no significant bleeding, no retained pieces of tissue, and no damage to the sphincter  The scope was removed, a 25 F irrigation catheter was inserted, irrigated clear, and hooked up to gravity drainage   The patient left the OR in good condition   I was present for the entire procedure    Patient Disposition:  PACU         SIGNATURE: Jovani Berrios MD  DATE: February 23, 2023  TIME: 5:22 PM

## 2023-02-23 NOTE — ASSESSMENT & PLAN NOTE
· Holding home Losartan - patient receiving Cardizem 120 mg 24 hr tablet and torsemide 10 mg daily with adequate BP control  · Monitor pressures

## 2023-02-23 NOTE — QUICK NOTE
See op report, TURP and temporary SP tube performed  If urine fairly clear in the morning, can restart all recommended anticoagulation at that time  Would then observe till Saturday and if urine still not too red, DC patient to home with White until next week    SP stays in place until we know he is voiding well

## 2023-02-23 NOTE — OCCUPATIONAL THERAPY NOTE
Occupational Therapy Cancel Note:    Patient Name: David Sams  Today's Date: 2/23/2023    Chart reviewed  Pt off floor at OR for TURP-SPT  Will cx and complete OT session at later time as schedule permits      [employer], OTR/L

## 2023-02-23 NOTE — ASSESSMENT & PLAN NOTE
· Patient's urine culture growing Enterococcus and Pseudomonas from 2/13/2023  · Currently with indwelling White catheter, asymptomatic, afebrile and without leukocytosis  · Given 5 day course of Ciprofloxacin per urology prior to admission  · Ciprofloxacin IV x 1 30 minutes prior to procedure yesterday  · Patient afebrile without leukocytosis  · Continue amoxicillin 500 mg every 8 hours x 2/5 days given Enterococcus growth and urine culture sensitivites

## 2023-02-23 NOTE — INTERVAL H&P NOTE
H&P reviewed  After examining the patient I find no changes in the patients condition since the H&P had been written      Vitals:    02/23/23 1137   BP: 101/58   Pulse: 65   Resp: 18   Temp: (!) 97 °F (36 1 °C)   SpO2: 99%

## 2023-02-23 NOTE — ASSESSMENT & PLAN NOTE
· With history of hypertension, hyperlipidemia, and hypothyroidism presented for elective TURP and possible suprapubic tube placement 2/21/2023 but was found to have rapid atrial fibrillation in OR holding  Patient asymptomatic  · Patient ready seen by cardiology, started on diltiazem gtt, short acting oral diltiazem and heparin infusion  · With chronic pedal edema, torsemide 10 mg daily started on admission as patient reported noncompliance and his PCP stopping medication due to constipation  · TSH within normal limits  · Echo with LVEF 15%, normal systolic function, B9QH  · Patient converted and remains in normal sinus rhythm  Cardizem gtt D/C'd, patient started on long acting Cardizem 120 mg 24 hr tablet  · S/p day #1 TURP-SPT  · Catheter bag with clear urine, no signs/symptoms of bleeding  · We will start Eliquis 5 mg twice daily - priced check with pharmacy and discussed with daughter   Script has been sent  · Monitor for hematuria and if clear patient should be stable for DC in the AM with White until next week  · Urology and cardiology follow up

## 2023-02-23 NOTE — PROGRESS NOTES
[facility]  Progress Note - Deborah Mode 1934, 80 y o  male MRN: [id number]  Unit/Bed#: E4 -01 Encounter: [id number]  Primary Care Provider: Padmini Vazquez MD   Date and time admitted to hospital: 2/21/2023 11:29 AM    * New onset atrial fibrillation [facility])  Assessment & Plan  · With history of hypertension, hyperlipidemia, and hypothyroidism presented for elective TURP and possible suprapubic tube placement 2/21/2023 but was found to have rapid atrial fibrillation in OR holding  Patient asymptomatic  · Patient ready seen by cardiology, started on diltiazem gtt, short acting oral diltiazem and heparin infusion  · With chronic pedal edema, torsemide 10 mg daily started on admission as patient reported noncompliance and his PCP stopping medication due to constipation  · TSH within normal limits  · Echo with LVEF 92%, normal systolic function, B4XY  · Patient converted and remains in normal sinus rhythm    Cardizem gtt D/C'd, patient started on long acting Cardizem 120 mg 24 hr tablet  · Heparin gtt held, patient scheduled for TURP-SPT this afternoon at 1530  · Price checking Eliquis with pharmacy as DOAC suggested for patient post-procedure  · Resume anticoagulation once cleared by urology post-procedure    Essential (primary) hypertension  Assessment & Plan  · Holding home Losartan - patient receiving Cardizem 120 mg 24 hr tablet and torsemide 10 mg daily with adequate BP control  · Monitor pressures    Gastro-esophageal reflux disease without esophagitis  Assessment & Plan  · Continue PPI    Hyperlipidemia  Assessment & Plan  · On PTA simvastatin, continue pravastatin    Peripheral vascular disease (HCC)  Assessment & Plan  · Continue statin    Hypothyroidism  Assessment & Plan  · Continue levothyroxine 125 mcg daily    BPH (benign prostatic hyperplasia)  Assessment & Plan  · Awaiting potential schedule for TURP and possible SPT placement  · Continue tamsulosin    Urinary tract infection  Assessment & Plan  · Patient's urine culture growing Enterococcus and Pseudomonas from 2023  · Currently with indwelling White catheter, asymptomatic, afebrile and without leukocytosis  · Given 5 day course of Ciprofloxacin per urology prior to admission  · Ciprofloxacin IV x 1 30 minutes prior to procedure  · We will add on amoxicillin 500 mg every 8 hours x 5 days given Enterococcus growth and urine culture sensitivites    VTE Pharmacologic Prophylaxis: VTE Score: 3 Moderate Risk (Score 3-4) - Pharmacological DVT Prophylaxis Ordered: heparin drip  Patient Centered Rounds: I performed bedside rounds with nursing staff today  Discussions with Specialists or Other Care Team Provider: Cardiology, Urology    Education and Discussions with Family / Patient: Updated  (daughter) via phone  Total Time Spent on Date of Encounter in care of patient: 45 minutes This time was spent on one or more of the following: performing physical exam; counseling and coordination of care; obtaining or reviewing history; documenting in the medical record; reviewing/ordering tests, medications or procedures; communicating with other healthcare professionals and discussing with patient's family/caregivers  Current Length of Stay: 2 day(s)  Current Patient Status: Inpatient   Certification Statement: The patient will continue to require additional inpatient hospital stay due to TURP [facility]  Discharge Plan: Anticipate discharge in 24-48 hrs to home with home services  Code Status: Level 1 - Full Code    Subjective:   Patient seen and examined  Reports he is feeling well without any chest pain, heart palpitations, or shortness of breath  He is without fevers, chills, abdominal pain, nausea or vomiting  He is ready for his procedure today       Objective:     Vitals:   Temp (24hrs), Av 7 °F (36 5 °C), Min:97 °F (36 1 °C), Max:98 6 °F (37 °C)    Temp:  [97 °F (36 1 °C)-98 6 °F (37 °C)] 97 °F (36 1 °C)  HR:  [65-72] 65  Resp:  [17-18] 18  BP: (101-124)/(53-58) 101/58  SpO2:  [97 %-99 %] 99 %  Body mass index is 22 92 kg/m²  Input and Output Summary (last 24 hours): Intake/Output Summary (Last 24 hours) at 2/23/2023 1356  Last data filed at 2/23/2023 0601  Gross per 24 hour   Intake 297 91 ml   Output 1750 ml   Net -1452 09 ml       Physical Exam:   Physical Exam  Vitals and nursing note reviewed  Constitutional:       General: He is not in acute distress  Appearance: Normal appearance  He is well-developed  He is not ill-appearing  HENT:      Head: Normocephalic and atraumatic  Eyes:      Extraocular Movements: Extraocular movements intact  Conjunctiva/sclera: Conjunctivae normal    Cardiovascular:      Rate and Rhythm: Normal rate and regular rhythm  Heart sounds: Normal heart sounds  No murmur heard  Pulmonary:      Effort: Pulmonary effort is normal  No respiratory distress  Breath sounds: Normal breath sounds  Abdominal:      General: Bowel sounds are normal       Palpations: Abdomen is soft  Tenderness: There is no abdominal tenderness  Genitourinary:     Comments: Indwelling kelly catheter draining yellow urine  Musculoskeletal:         General: No swelling  Right lower leg: No edema  Left lower leg: Edema present  Comments: Left pedal edema   Skin:     General: Skin is warm and dry  Capillary Refill: Capillary refill takes less than 2 seconds  Neurological:      Mental Status: He is alert     Psychiatric:         Mood and Affect: Mood normal         Additional Data:     Labs:  Results from last 7 days   Lab Units 02/23/23  0648   WBC Thousand/uL 4 82   HEMOGLOBIN g/dL 12 2   HEMATOCRIT % 35 7*   PLATELETS Thousands/uL 147*     Results from last 7 days   Lab Units 02/23/23  0648 02/22/23  0503   SODIUM mmol/L 137 137   POTASSIUM mmol/L 4 1 4 4   CHLORIDE mmol/L 107 106   CO2 mmol/L 26 26   BUN mg/dL 16 17   CREATININE mg/dL 1 05 1 07   ANION GAP mmol/L 4 5   CALCIUM mg/dL 8 9 8 8   ALBUMIN g/dL  --  3 2*   TOTAL BILIRUBIN mg/dL  --  0 72   ALK PHOS U/L  --  43   ALT U/L  --  15   AST U/L  --  19   GLUCOSE RANDOM mg/dL 103 95     Results from last 7 days   Lab Units 02/21/23  1852   INR  1 22*       Lines/Drains:  Invasive Devices     Peripheral Intravenous Line  Duration           Peripheral IV 02/21/23 Dorsal (posterior); Right Forearm 2 days    Peripheral IV 02/21/23 Left;Upper;Ventral (anterior) Arm 1 day    Peripheral IV 02/21/23 Right;Upper;Ventral (anterior) Arm 1 day          Drain  Duration           Urethral Catheter Latex 18 Fr  127 days    Urethral Catheter Non-latex 16 Fr  1 day              Urinary Catheter:  Goal for removal: Per urology, pending TURP today    Telemetry:  Telemetry Orders (From admission, onward)             48 Hour Telemetry Monitoring  Continuous x 48 hours        Expiring   References:    Telemetry Guidelines   Question:  Reason for 48 Hour Telemetry  Answer:  Arrhythmias Requiring Medical Therapy (eg  SVT, Vtach/fib, Bradycardia, Uncontrolled A-fib)                 Telemetry Reviewed: Normal Sinus Rhythm  Indication for Continued Telemetry Use: Arrthymias requiring medical therapy             Imaging: Reviewed radiology reports from this admission including: ECHO    Recent Cultures (last 7 days):     Last 24 Hours Medication List:   Current Facility-Administered Medications   Medication Dose Route Frequency Provider Last Rate   • acetaminophen  650 mg Oral Q6H PRN Filemon Gupta DO     • amoxicillin  500 mg Oral Q8H [facility] Curtis Rice PA-C     • ciprofloxacin  400 mg Intravenous Once Christiano Gomez PA-C     • diltiazem  120 mg Oral Daily Gerald Burnett, DO     • heparin (porcine)  2,000 Units Intravenous Q6H PRN Gerald Purpura, DO     • heparin (porcine)  4,000 Units Intravenous Q6H PRN Gerald Purpura, DO     • levothyroxine  125 mcg Oral Early Morning Keo Loomis DO     • ondansetron  4 mg Intravenous Q4H PRN Leeanngerardo Gordon, DO     • pantoprazole  40 mg Oral Early Morning Keo Loomis DO     • pravastatin  40 mg Oral Daily With [employer], DO     • tamsulosin  0 4 mg Oral Daily With [employer], DO     • torsemide  10 mg Oral Daily Leeann Gordon DO          Today, Patient Was Seen By: Camryn Real PA-C    **Please Note: This note may have been constructed using a voice recognition system  **

## 2023-02-23 NOTE — PROGRESS NOTES
Progress Note - Urology  Kenya Oneal 1934, 80 y o  male MRN: [id number]    Unit/Bed#: E4 -01 Encounter: [id number]    Assessment & Plan    BPH with urinary retention - kelly dependent  Rescheduled TURP-SPT for today, 2/23/23 @ 1530 @ [facility] with Dr Erika Chowdhury  Heparin held at 0930 today  Will hold off AC until evaluation tomorrow following his procedure per Dr Erika Chowdhury  NPO  Afib with RVR- asmptomatic resolved  Now in NSR  Appreciate medicine and cardiology input  They will plan to transition to oral anticoagulation after his urologic procedure when ready  Subjective:   HPI: Patient denies any flank or abdominal pain today  His Kelly catheter is patent draining clear yellow urine  He denies any chest pain or shortness of breath  He denies any fevers or chills overnight  Review of Systems   Constitutional: Negative for chills and fever  HENT: Negative  Respiratory: Negative for cough and shortness of breath  Cardiovascular: Negative for chest pain and palpitations  Gastrointestinal: Negative for abdominal pain, nausea and vomiting  Genitourinary: Negative for flank pain, hematuria, scrotal swelling and testicular pain  Musculoskeletal: Negative  Skin: Negative  Neurological: Negative for dizziness and light-headedness  Objective:  Nursing Rounds: Plan discussed with Rickie Sierra RN  Communications: Plan discussed with Jocelyn Schultz PA-C  Vitals: Blood pressure 101/58, pulse 65, temperature (!) 97 °F (36 1 °C), temperature source Temporal, resp  rate 18, height 6' (1 829 m), weight 76 7 kg (169 lb), SpO2 99 %  ,Body mass index is 22 92 kg/m²  Physical Exam  Vitals reviewed  Constitutional:       General: He is not in acute distress  Appearance: Normal appearance  He is not ill-appearing, toxic-appearing or diaphoretic  HENT:      Head: Normocephalic and atraumatic     Eyes:      Conjunctiva/sclera: Conjunctivae normal    Cardiovascular:      Rate and Rhythm: Normal rate  Pulmonary:      Effort: Pulmonary effort is normal  No respiratory distress  Abdominal:      General: There is no distension  Palpations: Abdomen is soft  Tenderness: There is no abdominal tenderness  There is no right CVA tenderness, left CVA tenderness, guarding or rebound  Genitourinary:     Comments: White catheter patent draining clear yellow urine  Musculoskeletal:         General: Normal range of motion  Cervical back: Normal range of motion  Skin:     General: Skin is warm and dry  Neurological:      General: No focal deficit present  Mental Status: He is alert and oriented to person, place, and time  Psychiatric:         Mood and Affect: Mood normal          Behavior: Behavior normal          Thought Content: Thought content normal          Judgment: Judgment normal        Imaging:  No new imaging  Labs:  Recent Labs     02/21/23  1852 02/22/23  0503 02/23/23  0648   WBC 5 12 5 86 4 82     Recent Labs     02/21/23  1852 02/22/23  0503 02/23/23  0648   HGB 12 1 12 2 12 2     Recent Labs     02/21/23  1852 02/22/23  0503 02/23/23  0648   HCT 35 7* 36 1* 35 7*     Recent Labs     02/22/23  0503 02/23/23  0648   CREATININE 1 07 1 05     Preop Urine Culture >100,000 cfu/ml Pseudomonas aeruginosa Abnormal        >100,000 cfu/ml Enterococcus faecalis Abnormal     This organism has been edited  The previous result was Enterococcus species on 2/14/2023 at 2240 EST          Susceptibility     Pseudomonas aeruginosa Enterococcus faecalis     MICHAEL MICHAEL     Amikacin ($$) <=16 ug/ml Susceptible       Ampicillin ($$)   <=2 00 ug/ml Susceptible     Aztreonam ($$$)  <=4 ug/ml Susceptible       Cefepime ($) <=2 00 ug/ml Susceptible       Ceftazidime ($$) 4 ug/ml Susceptible       Ciprofloxacin ($)  <=0 25 ug/ml Susceptible       Gentamicin ($$) 8 ug/ml Intermediate       Imipenem <=1 ug/ml Susceptible       Levofloxacin ($) <=0 50 ug/ml Susceptible <=0 50 ug/ml Susceptible Meropenem ($$) <=1 00 ug/ml Susceptible       Nitrofurantoin   <=32 ug/ml Susceptible     Piperacillin + Tazobactam ($$$) <=8 ug/ml Susceptible       Tetracycline   >8 ug/ml Resistant     Tobramycin ($) <=2 ug/ml Susceptible       Vancomycin ($)   0 50 ug/ml Susceptible     ZID Performed Yes   Yes                      Specimen Collected: 23 16:16 Last Resulted: 02/15/23 18:50           History:  Past Medical History:   Diagnosis Date   • BPH (benign prostatic hyperplasia)    • GERD (gastroesophageal reflux disease)    • Hyperlipidemia    • Hypertension    • Hypothyroidism    • Kidney stone    • Peripheral vascular disease ([facility] [address] )    • Walker as ambulation aid      Social History     Socioeconomic History   • Marital status: /Civil Union     Spouse name: None   • Number of children: None   • Years of education: None   • Highest education level: None   Occupational History   • None   Tobacco Use   • Smoking status: Former     Types: Cigars, Pipe     Quit date: 1970     Years since quittin 1   • Smokeless tobacco: Never   Vaping Use   • Vaping Use: Never used   Substance and Sexual Activity   • Alcohol use: Not Currently   • Drug use: Never   • Sexual activity: Not Currently   Other Topics Concern   • None   Social History Narrative   • None     Social Determinants of Health     Financial Resource Strain: Not on file   Food Insecurity: No Food Insecurity   • Worried About Running Out of Food in the Last Year: Never true   • Ran Out of Food in the Last Year: Never true   Transportation Needs: No Transportation Needs   • Lack of Transportation (Medical): No   • Lack of Transportation (Non-Medical):  No   Physical Activity: Not on file   Stress: Not on file   Social Connections: Not on file   Intimate Partner Violence: Not on file   Housing Stability: Low Risk    • Unable to Pay for Housing in the Last Year: No   • Number of Places Lived in the Last Year: 1   • Unstable Housing in the Last Year: No     Past Surgical History:   Procedure Laterality Date   • COLONOSCOPY     • FL RETROGRADE PYELOGRAM  10/18/2022   • JOINT REPLACEMENT Right 10/2022    hip   • IN CYSTO BLADDER W/URETERAL CATHETERIZATION Left 10/18/2022    Procedure: CYSTOSCOPY RETROGRADE PYELOGRAM WITH INTERPRETATION AND WITH INSERTION STENT URETERAL;  Surgeon: Yoseph Leo MD;  Location:  MAIN OR;  Service: Urology   • IN CYSTO W/INSERT URETERAL STENT Left 11/15/2022    Procedure: EXCHANGE STENT URETERAL;  Surgeon: Mike Terrazas MD;  Location: [address] [place] [address] MAIN OR;  Service: Urology   • IN CYSTO/URETERO W/LITHOTRIPSY &INDWELL STENT INSRT Left 11/15/2022    Procedure: CYSTOSCOPY URETEROSCOPY WITH LITHOTRIPSY HOLMIUM LASER, RETROGRADE PYELOGRAM AND INSERTION STENT URETERAL;  Surgeon: Mike Terrazas MD;  Location: [address] [place] [address] OR;  Service: Urology     History reviewed  No pertinent family history      Yolanda Richardson PA-C  Date: 2/23/2023 Time: 12:32 PM

## 2023-02-23 NOTE — NURSING NOTE
Assumed care of patient at this time  Sleeping in bed in no apparent distress  Call light in reach  Will continue to monitor

## 2023-02-24 ENCOUNTER — TELEPHONE (OUTPATIENT)
Dept: OTHER | Facility: HOSPITAL | Age: 88
End: 2023-02-24

## 2023-02-24 LAB
ANION GAP SERPL CALCULATED.3IONS-SCNC: 5 MMOL/L (ref 4–13)
BUN SERPL-MCNC: 12 MG/DL (ref 5–25)
CALCIUM SERPL-MCNC: 8.6 MG/DL (ref 8.4–10.2)
CHLORIDE SERPL-SCNC: 105 MMOL/L (ref 96–108)
CO2 SERPL-SCNC: 25 MMOL/L (ref 21–32)
CREAT SERPL-MCNC: 1.03 MG/DL (ref 0.6–1.3)
ERYTHROCYTE [DISTWIDTH] IN BLOOD BY AUTOMATED COUNT: 13.4 % (ref 11.6–15.1)
GFR SERPL CREATININE-BSD FRML MDRD: 64 ML/MIN/1.73SQ M
GLUCOSE SERPL-MCNC: 141 MG/DL (ref 65–140)
HCT VFR BLD AUTO: 33.9 % (ref 36.5–49.3)
HGB BLD-MCNC: 11.6 G/DL (ref 12–17)
INR PPP: 1.13 (ref 0.84–1.19)
MCH RBC QN AUTO: 33.8 PG (ref 26.8–34.3)
MCHC RBC AUTO-ENTMCNC: 34.2 G/DL (ref 31.4–37.4)
MCV RBC AUTO: 99 FL (ref 82–98)
PLATELET # BLD AUTO: 139 THOUSANDS/UL (ref 149–390)
PMV BLD AUTO: 8.5 FL (ref 8.9–12.7)
POTASSIUM SERPL-SCNC: 4.1 MMOL/L (ref 3.5–5.3)
PROTHROMBIN TIME: 14.5 SECONDS (ref 11.6–14.5)
RBC # BLD AUTO: 3.43 MILLION/UL (ref 3.88–5.62)
SODIUM SERPL-SCNC: 135 MMOL/L (ref 135–147)
WBC # BLD AUTO: 6.62 THOUSAND/UL (ref 4.31–10.16)

## 2023-02-24 PROCEDURE — 80048 BASIC METABOLIC PNL TOTAL CA: CPT

## 2023-02-24 PROCEDURE — 99232 SBSQ HOSP IP/OBS MODERATE 35: CPT | Performed by: STUDENT IN AN ORGANIZED HEALTH CARE EDUCATION/TRAINING PROGRAM

## 2023-02-24 PROCEDURE — 99024 POSTOP FOLLOW-UP VISIT: CPT

## 2023-02-24 PROCEDURE — 85027 COMPLETE CBC AUTOMATED: CPT

## 2023-02-24 PROCEDURE — 85610 PROTHROMBIN TIME: CPT

## 2023-02-24 PROCEDURE — 99232 SBSQ HOSP IP/OBS MODERATE 35: CPT | Performed by: INTERNAL MEDICINE

## 2023-02-24 RX ORDER — CIPROFLOXACIN 500 MG/1
500 TABLET, FILM COATED ORAL EVERY 12 HOURS
Status: DISCONTINUED | OUTPATIENT
Start: 2023-02-24 | End: 2023-02-24

## 2023-02-24 RX ORDER — DILTIAZEM HYDROCHLORIDE 120 MG/1
120 CAPSULE, COATED, EXTENDED RELEASE ORAL DAILY
Qty: 30 CAPSULE | Refills: 0 | OUTPATIENT
Start: 2023-02-25

## 2023-02-24 RX ORDER — POLYETHYLENE GLYCOL 3350 17 G/17G
17 POWDER, FOR SOLUTION ORAL DAILY PRN
Status: DISCONTINUED | OUTPATIENT
Start: 2023-02-24 | End: 2023-02-25 | Stop reason: HOSPADM

## 2023-02-24 RX ORDER — AMOXICILLIN 500 MG/1
500 CAPSULE ORAL EVERY 8 HOURS SCHEDULED
Status: DISCONTINUED | OUTPATIENT
Start: 2023-02-24 | End: 2023-02-25 | Stop reason: HOSPADM

## 2023-02-24 RX ORDER — DOCUSATE SODIUM 100 MG/1
100 CAPSULE, LIQUID FILLED ORAL 2 TIMES DAILY
Status: DISCONTINUED | OUTPATIENT
Start: 2023-02-24 | End: 2023-02-25 | Stop reason: HOSPADM

## 2023-02-24 RX ORDER — LIDOCAINE 50 MG/G
1 PATCH TOPICAL DAILY
Status: DISCONTINUED | OUTPATIENT
Start: 2023-02-24 | End: 2023-02-25 | Stop reason: HOSPADM

## 2023-02-24 RX ADMIN — PRAVASTATIN SODIUM 40 MG: 40 TABLET ORAL at 17:54

## 2023-02-24 RX ADMIN — CIPROFLOXACIN 400 MG: 2 INJECTION, SOLUTION INTRAVENOUS at 04:09

## 2023-02-24 RX ADMIN — APIXABAN 5 MG: 5 TABLET, FILM COATED ORAL at 17:54

## 2023-02-24 RX ADMIN — DILTIAZEM HYDROCHLORIDE 120 MG: 120 CAPSULE, COATED, EXTENDED RELEASE ORAL at 09:38

## 2023-02-24 RX ADMIN — ACETAMINOPHEN 325MG 650 MG: 325 TABLET ORAL at 05:27

## 2023-02-24 RX ADMIN — APIXABAN 5 MG: 5 TABLET, FILM COATED ORAL at 10:08

## 2023-02-24 RX ADMIN — TORSEMIDE 10 MG: 20 TABLET ORAL at 09:38

## 2023-02-24 RX ADMIN — ACETAMINOPHEN 325MG 650 MG: 325 TABLET ORAL at 23:50

## 2023-02-24 RX ADMIN — AMOXICILLIN 500 MG: 500 CAPSULE ORAL at 21:32

## 2023-02-24 RX ADMIN — DOCUSATE SODIUM 100 MG: 100 CAPSULE, LIQUID FILLED ORAL at 17:54

## 2023-02-24 RX ADMIN — AMOXICILLIN 500 MG: 500 CAPSULE ORAL at 17:54

## 2023-02-24 RX ADMIN — ACETAMINOPHEN 325MG 650 MG: 325 TABLET ORAL at 17:54

## 2023-02-24 NOTE — TELEPHONE ENCOUNTER
Pt remains inpatient  He is scheduled on 2- at 9am for kelly catheter removal and SP tube teaching  Once discharged, office will contact him to confirm appointment

## 2023-02-24 NOTE — TELEPHONE ENCOUNTER
Patient currently admitted, S/p TURP yesterday by Dr Rigo Stroud  Expected discharge tomorrow  Will need outpatient void trial early next week as well as SPT teaching for PVR  Patient will also need 2 week post-op OV to review pathology

## 2023-02-24 NOTE — PLAN OF CARE
Problem: PAIN - ADULT  Goal: Verbalizes/displays adequate comfort level or baseline comfort level  Description: Interventions:  - Encourage patient to monitor pain and request assistance  - Assess pain using appropriate pain scale  - Administer analgesics based on type and severity of pain and evaluate response  - Implement non-pharmacological measures as appropriate and evaluate response  - Consider cultural and social influences on pain and pain management  - Notify physician/advanced practitioner if interventions unsuccessful or patient reports new pain  Outcome: Progressing     Problem: INFECTION - ADULT  Goal: Absence or prevention of progression during hospitalization  Description: INTERVENTIONS:  - Assess and monitor for signs and symptoms of infection  - Monitor lab/diagnostic results  - Monitor all insertion sites, i e  indwelling lines, tubes, and drains  - Monitor endotracheal if appropriate and nasal secretions for changes in amount and color  - [place] appropriate cooling/warming therapies per order  - Administer medications as ordered  - Instruct and encourage patient and family to use good hand hygiene technique  - Identify and instruct in appropriate isolation precautions for identified infection/condition  Outcome: Progressing  Goal: Absence of fever/infection during neutropenic period  Description: INTERVENTIONS:  - Monitor WBC    Outcome: Progressing     Problem: SAFETY ADULT  Goal: Patient will remain free of falls  Description: INTERVENTIONS:  - Educate patient/family on patient safety including physical limitations  - Instruct patient to call for assistance with activity   - Consult OT/PT to assist with strengthening/mobility   - Keep Call bell within reach  - Keep bed low and locked with side rails adjusted as appropriate  - Keep care items and personal belongings within reach  - Initiate and maintain comfort rounds  - Make Fall Risk Sign visible to staff  - Offer Toileting every 2 Hours, in advance of need  - Initiate/Maintain bed alarm  - Obtain necessary fall risk management equipment: alarm  - Apply yellow socks and bracelet for high fall risk patients  - Consider moving patient to room near nurses station  Outcome: Progressing  Goal: Maintain or return to baseline ADL function  Description: INTERVENTIONS:  -  Assess patient's ability to carry out ADLs; assess patient's baseline for ADL function and identify physical deficits which impact ability to perform ADLs (bathing, care of mouth/teeth, toileting, grooming, dressing, etc )  - Assess/evaluate cause of self-care deficits   - Assess range of motion  - Assess patient's mobility; develop plan if impaired  - Assess patient's need for assistive devices and provide as appropriate  - Encourage maximum independence but intervene and supervise when necessary  - Involve family in performance of ADLs  - Assess for home care needs following discharge   - Consider OT consult to assist with ADL evaluation and planning for discharge  - Provide patient education as appropriate  Outcome: Progressing  Goal: Maintains/Returns to pre admission functional level  Description: INTERVENTIONS:  - Perform BMAT or MOVE assessment daily    - Set and communicate daily mobility goal to care team and patient/family/caregiver  - Collaborate with rehabilitation services on mobility goals if consulted  - Perform Range of Motion 3 times a day  - Reposition patient every 3 hours    - Dangle patient 3 times a day  - Stand patient 3 times a day  - Ambulate patient 3 times a day  - Out of bed to chair 3 times a day   - Out of bed for meals 3 times a day  - Out of bed for toileting  - Record patient progress and toleration of activity level   Outcome: Progressing     Problem: DISCHARGE PLANNING  Goal: Discharge to home or other facility with appropriate resources  Description: INTERVENTIONS:  - Identify barriers to discharge w/patient and caregiver  - Arrange for needed discharge resources and transportation as appropriate  - Identify discharge learning needs (meds, wound care, etc )  - Arrange for interpretive services to assist at discharge as needed  - Refer to Case Management Department for coordinating discharge planning if the patient needs post-hospital services based on physician/advanced practitioner order or complex needs related to functional status, cognitive ability, or social support system  Outcome: Progressing     Problem: Knowledge Deficit  Goal: Patient/family/caregiver demonstrates understanding of disease process, treatment plan, medications, and discharge instructions  Description: Complete learning assessment and assess knowledge base  Interventions:  - Provide teaching at level of understanding  - Provide teaching via preferred learning methods  Outcome: Progressing     Problem: MOBILITY - ADULT  Goal: Maintain or return to baseline ADL function  Description: INTERVENTIONS:  -  Assess patient's ability to carry out ADLs; assess patient's baseline for ADL function and identify physical deficits which impact ability to perform ADLs (bathing, care of mouth/teeth, toileting, grooming, dressing, etc )  - Assess/evaluate cause of self-care deficits   - Assess range of motion  - Assess patient's mobility; develop plan if impaired  - Assess patient's need for assistive devices and provide as appropriate  - Encourage maximum independence but intervene and supervise when necessary  - Involve family in performance of ADLs  - Assess for home care needs following discharge   - Consider OT consult to assist with ADL evaluation and planning for discharge  - Provide patient education as appropriate  Outcome: Progressing  Goal: Maintains/Returns to pre admission functional level  Description: INTERVENTIONS:  - Perform BMAT or MOVE assessment daily    - Set and communicate daily mobility goal to care team and patient/family/caregiver     - Collaborate with rehabilitation services on mobility goals if consulted  - Perform Range of Motion 3 times a day  - Reposition patient every 3 hours    - Dangle patient 3 times a day  - Stand patient 3 times a day  - Ambulate patient 3 times a day  - Out of bed to chair 3 times a day   - Out of bed for meals 3 times a day  - Out of bed for toileting  - Record patient progress and toleration of activity level   Outcome: Progressing     Problem: Prexisting or High Potential for Compromised Skin Integrity  Goal: Skin integrity is maintained or improved  Description: INTERVENTIONS:  - Identify patients at risk for skin breakdown  - Assess and monitor skin integrity  - Assess and monitor nutrition and hydration status  - Monitor labs   - Assess for incontinence   - Turn and reposition patient  - Assist with mobility/ambulation  - Relieve pressure over bony prominences  - Avoid friction and shearing  - Provide appropriate hygiene as needed including keeping skin clean and dry  - Evaluate need for skin moisturizer/barrier cream  - Collaborate with interdisciplinary team   - Patient/family teaching  - Consider wound care consult   Outcome: Progressing

## 2023-02-24 NOTE — TELEPHONE ENCOUNTER
Patient daughter called seeing if he needs the nurse visit brigette on 2/28/23 since is surgery was pushed off 2 days  Should his nurse visit be pushed off too    Please call her at [phone]

## 2023-02-24 NOTE — PROGRESS NOTES
Cardiology Progress Note - Ida Andersen 80 y o  male MRN: [id number]    Unit/Bed#: E4 -01 Encounter: [id number]      Assessment & Plan:    New onset [facility] fibrillation ([facility] )  -Initially presented with A-fib with RVR, now converted back to sinus rhythm  - Continue with diltiazem 120 mg daily for rate control  -Started on Eliquis 5 mg twice daily this morning    BPH (benign prostatic hyperplasia)  -s/p TURP with suprapubic catheter placement yesterday    Essential (primary) hypertension  - Continue with diltiazem 120 mg daily and torsemide 10 mg daily  - BP well controlled    Gastro-esophageal reflux disease without esophagitis    Hyperlipidemia  - Continue with pravastatin 40 mg daily    Hypothyroidism    Peripheral vascular disease ([facility] )    Summary:  -Continue with Eliquis 5 mg twice daily with diltiazem 120 mg daily  - Otherwise stable from a cardiac standpoint  - Will message office to arrange a follow-up appointment    Cardiology will sign off  Please call with any questions or concerns  Subjective:   No significant events overnight  He reports the surgery went well yesterday and denies any pain currently  Denies chest pain, shortness of breath, orthopnea, abdominal pain, nausea, vomiting, fever, chills, headache, dizziness or palpitations  Objective:     Vitals: Blood pressure 103/53, pulse 78, temperature 98 2 °F (36 8 °C), temperature source Temporal, resp  rate 16, height 6' (1 829 m), weight 76 7 kg (169 lb), SpO2 99 %  , Body mass index is 22 92 kg/m² ,   Orthostatic Blood Pressures    Flowsheet Row Most Recent Value   Blood Pressure 103/53 filed at 02/24/2023 0848   Patient Position - Orthostatic VS Sitting filed at 02/24/2023 0848            Intake/Output Summary (Last 24 hours) at 2/24/2023 0935  Last data filed at 2/24/2023 0409  Gross per 24 hour   Intake 950 ml   Output -1800 ml   Net 2750 ml           Physical Exam:    GEN: Ida Andersen appears well, alert and oriented x 3, pleasant and cooperative   HEENT: anicteric, mucous membranes moist  NECK: no jvd, carotid bruits   HEART: regular rate and rhythm, normal S1 and S2, no murmurs, clicks, gallops or rubs   LUNGS: clear to auscultation bilaterally; no wheezes, rales, or rhonchi   ABDOMEN: normal bowel sounds, soft, no tenderness, no distention, s/p suprapubic catheter placement, covered with bandages  EXTREMITIES: peripheral pulses normal; no clubbing, cyanosis, or edema  NEURO: no focal findings   SKIN: normal without suspicious lesions on exposed skin      Current Facility-Administered Medications:   •  acetaminophen (TYLENOL) tablet 650 mg, 650 mg, Oral, Q6H PRN, Kathrin Puente PA-C  •  acetaminophen (TYLENOL) tablet 650 mg, 650 mg, Oral, Q6H [facility], Emma Mcclure MD, 650 mg at 02/24/23 4929  •  apixaban (ELIQUIS) tablet 5 mg, 5 mg, Oral, BID, Curtis Rice PA-C  •  belladonna-opium (B&O SUPPOSITORY) 16 2-30 mg suppository 1 suppository, 1 suppository, Rectal, Q6H PRN, Emma Mcclure MD  •  ciprofloxacin (CIPRO) tablet 500 mg, 500 mg, Oral, Q12H, Keo Loomis DO  •  diltiazem (CARDIZEM CD) 24 hr capsule 120 mg, 120 mg, Oral, Daily, Curtis Rice PA-C, 120 mg at 02/23/23 0875  •  lactated ringers bolus 1,000 mL, 1,000 mL, Intravenous, Once PRN **AND** lactated ringers bolus 1,000 mL, 1,000 mL, Intravenous, Once PRN, Emma Mcclure MD  •  lactated ringers infusion, 100 mL/hr, Intravenous, Continuous, Haroon Vaughan DO, Last Rate: 100 mL/hr at 02/23/23 1726, 100 mL/hr at 02/23/23 1726  •  levothyroxine tablet 125 mcg, 125 mcg, Oral, Early Morning, Cutris Rice PA-C, 125 mcg at 02/23/23 0559  •  lidocaine (LIDODERM) 5 % patch 1 patch, 1 patch, Topical, Daily, Curtis Rice PA-C  •  ondansetron (ZOFRAN) injection 4 mg, 4 mg, Intravenous, Q6H PRN, Emma Mcclure MD  •  oxybutynin (DITROPAN) tablet 5 mg, 5 mg, Oral, Q6H PRN, Emma Mcclure MD  •  pantoprazole (PROTONIX) EC tablet 40 mg, 40 mg, Oral, Early Morning, Curtis Rice PA-C, 40 mg at 02/23/23 0559  •  pravastatin (PRAVACHOL) tablet 40 mg, 40 mg, Oral, Daily With Dinner, Catherine Welsh PA-C, 40 mg at 02/22/23 1712  •  sodium chloride 0 9 % bolus 1,000 mL, 1,000 mL, Intravenous, Once PRN **AND** sodium chloride 0 9 % bolus 1,000 mL, 1,000 mL, Intravenous, Once PRN, Shiloh Keller MD  •  torsemide [facility]) tablet 10 mg, 10 mg, Oral, Daily, Catherine Welsh PA-C    Labs & Results:    No results found for: CKTOTAL, CKMB, CKMBINDEX, TROPONINI    Lab Results   Component Value Date    CALCIUM 8 6 02/24/2023    K 4 1 02/24/2023    CO2 25 02/24/2023     02/24/2023    BUN 12 02/24/2023    CREATININE 1 03 02/24/2023       Lab Results   Component Value Date    WBC 6 62 02/24/2023    HGB 11 6 (L) 02/24/2023    HCT 33 9 (L) 02/24/2023    MCV 99 (H) 02/24/2023     (L) 02/24/2023     Results from last 7 days   Lab Units 02/24/23  0458   INR  1 13       No results found for: CHOL  No results found for: HDL  No results found for: LDLCALC  No results found for: TRIG    Lab Results   Component Value Date    ALT 15 02/22/2023    AST 19 02/22/2023         EKG personally reviewed by )Harley Martin MD  No acute changes   TELE: No significant arrhythmias seen on telemetry review

## 2023-02-24 NOTE — PROGRESS NOTES
Progress Note - Urology  Rubi Bui 1934, 80 y o  male MRN: [id number]    Unit/Bed#: E4 -01 Encounter: [id number]    BPH (benign prostatic hyperplasia)  Assessment & Plan  · POD#1 TURP with insertion of SPT  · Pain well controlled, he is tolerating his diet, passing gas  Does complain of some constipation  · Vital signs stable overnight, negative leukocytosis, normal Hgb and creatinine  · CBI currently running at low rate with clear urine no clots  · SPT dressing with moderate bloody drainage  This was removed and a new clean dry dressing was placed  Insertion site is clean, dry, without blood drainage  Plan:  · Will stop CBI and reassess later this afternoon  If urine remains clear patient can be restarted on Eliquis  Recommend keeping patient 1 additional night for continued monitoring of hematuria after restarting of Eliquis  · Encourage ambulation and to sit up out of bed to eat meals  · Reinforce incentive spirometry  · We will add Colace BID and Miralax daily as needed for constipation   · White catheter to remain in place until next week for outpatient trial void  Suprapubic tube should remain capped until outpatient follow-up as well  Subjective:   HPI:     Review of Systems   Constitutional: Negative for chills and fever  HENT: Negative for congestion and sore throat  Respiratory: Negative for cough and shortness of breath  Cardiovascular: Negative for chest pain and leg swelling  Gastrointestinal: Positive for constipation  Negative for abdominal pain, diarrhea, nausea and vomiting  Genitourinary: Negative for difficulty urinating, dysuria, flank pain, frequency, hematuria and urgency  White catheter and SPT    Musculoskeletal: Negative for back pain and gait problem  Skin: Negative for wound  Allergic/Immunologic: Negative for immunocompromised state  Neurological: Negative for dizziness, weakness and numbness     Hematological: Does not bruise/bleed easily  Objective:  Nursing Rounds:   Vitals: Blood pressure 110/70, pulse 74, temperature 98 °F (36 7 °C), temperature source Temporal, resp  rate 16, height 6' (1 829 m), weight 76 7 kg (169 lb), SpO2 99 %  ,Body mass index is 22 92 kg/m²  Physical Exam  Vitals reviewed  Constitutional:       General: He is not in acute distress  Appearance: Normal appearance  He is not ill-appearing or toxic-appearing  HENT:      Head: Normocephalic and atraumatic  Eyes:      General: No scleral icterus  Conjunctiva/sclera: Conjunctivae normal    Cardiovascular:      Rate and Rhythm: Normal rate and regular rhythm  Pulses: Normal pulses  Heart sounds: Normal heart sounds  Pulmonary:      Effort: Pulmonary effort is normal  No respiratory distress  Breath sounds: Normal breath sounds  Abdominal:      General: Abdomen is flat  Bowel sounds are normal       Palpations: Abdomen is soft  Tenderness: There is no abdominal tenderness  There is no right CVA tenderness or left CVA tenderness  Hernia: No hernia is present  Genitourinary:     Comments: White catheter draining clear yellow urine to gravity  SPT dressing with moderate bloody drainage  This was removed and insertion site is clean dry without bloody drainage  Musculoskeletal:      Cervical back: Normal range of motion  Right lower leg: No edema  Left lower leg: No edema  Skin:     General: Skin is warm and dry  Coloration: Skin is not jaundiced or pale  Neurological:      General: No focal deficit present  Mental Status: He is alert and oriented to person, place, and time  Mental status is at baseline  Gait: Gait normal    Psychiatric:         Mood and Affect: Mood normal          Behavior: Behavior normal          Thought Content: Thought content normal          Judgment: Judgment normal          Imaging:    Imaging reviewed - both report and images personally reviewed  Labs:  Recent Labs     23  1852 23  0503 23  0648 23  0458   WBC 5 12 5 86 4 82 6 62       Recent Labs     23  1852 23  0503 23  0648 23  0458   HGB 12 1 12 2 12 2 11 6*     Recent Labs     23  1852 23  0503 23  0648 23  0458   HCT 35 7* 36 1* 35 7* 33 9*     Recent Labs     23  0503 23  0648 23  0458   CREATININE 1 07 1 05 1 03         History:    Past Medical History:   Diagnosis Date   • BPH (benign prostatic hyperplasia)    • GERD (gastroesophageal reflux disease)    • Hyperlipidemia    • Hypertension    • Hypothyroidism    • Kidney stone    • Peripheral vascular disease ([facility] )    • Walker as ambulation aid      Social History     Socioeconomic History   • Marital status: /Civil Union     Spouse name: None   • Number of children: None   • Years of education: None   • Highest education level: None   Occupational History   • None   Tobacco Use   • Smoking status: Former     Types: Cigars, Pipe     Quit date: 1970     Years since quittin 1   • Smokeless tobacco: Never   Vaping Use   • Vaping Use: Never used   Substance and Sexual Activity   • Alcohol use: Not Currently   • Drug use: Never   • Sexual activity: Not Currently   Other Topics Concern   • None   Social History Narrative   • None     Social Determinants of Health     Financial Resource Strain: Not on file   Food Insecurity: No Food Insecurity   • Worried About Running Out of Food in the Last Year: Never true   • Ran Out of Food in the Last Year: Never true   Transportation Needs: No Transportation Needs   • Lack of Transportation (Medical): No   • Lack of Transportation (Non-Medical):  No   Physical Activity: Not on file   Stress: Not on file   Social Connections: Not on file   Intimate Partner Violence: Not on file   Housing Stability: Low Risk    • Unable to Pay for Housing in the Last Year: No   • Number of Places Lived in the Last Year: 1   • Unstable Housing in the Last Year: No     Past Surgical History:   Procedure Laterality Date   • COLONOSCOPY     • FL RETROGRADE PYELOGRAM  10/18/2022   • JOINT REPLACEMENT Right 10/2022    hip   • NY CYSTO BLADDER W/URETERAL CATHETERIZATION Left 10/18/2022    Procedure: CYSTOSCOPY RETROGRADE PYELOGRAM WITH INTERPRETATION AND WITH INSERTION STENT URETERAL;  Surgeon: Aleisha Kerr MD;  Location:  MAIN OR;  Service: Urology   • NY CYSTO W/INSERT URETERAL STENT Left 11/15/2022    Procedure: EXCHANGE STENT URETERAL;  Surgeon: Cornelia Valenzuela MD;  Location: [facility] MAIN OR;  Service: Urology   • NY CYSTO/URETERO W/LITHOTRIPSY &INDWELL STENT INSRT Left 11/15/2022    Procedure: CYSTOSCOPY URETEROSCOPY WITH LITHOTRIPSY HOLMIUM LASER, RETROGRADE PYELOGRAM AND INSERTION STENT URETERAL;  Surgeon: Cornelia Valenzuela MD;  Location: [facility] MAIN OR;  Service: Urology   • SUPRAPUBIC TUBE PLACEMENT N/A 2/23/2023    Procedure: INSERTION SUPRAPUBIC CATHETER PERCUTANEOUS;  Surgeon: Yuliya Carter MD;  Location: [facility] Main OR;  Service: Urology   • TRANSURETHRAL RESECTION OF PROSTATE N/A 2/23/2023    Procedure: (TURP), SPT insertion;  Surgeon: Yuliya Carter MD;  Location: [facility] OR;  Service: Urology     History reviewed  No pertinent family history      ERASTO Piedra  Date: 2/24/2023 Time: 1:23 PM

## 2023-02-24 NOTE — ASSESSMENT & PLAN NOTE
· POD#1 TURP with insertion of SPT  · Pain well controlled, he is tolerating his diet, passing gas  Does complain of some constipation  · Vital signs stable overnight, negative leukocytosis, normal Hgb and creatinine  · CBI currently running at low rate with clear urine no clots  · SPT dressing with moderate bloody drainage  This was removed and a new clean dry dressing was placed  Insertion site is clean, dry, without blood drainage  Plan:  · Will stop CBI and reassess later this afternoon  If urine remains clear patient can be restarted on Eliquis  Recommend keeping patient 1 additional night for continued monitoring of hematuria after restarting of Eliquis  · Encourage ambulation and to sit up out of bed to eat meals  · Reinforce incentive spirometry  · We will add Colace BID and Miralax daily as needed for constipation   · White catheter to remain in place until next week for outpatient trial void  Suprapubic tube should remain capped until outpatient follow-up as well

## 2023-02-24 NOTE — ANESTHESIA POSTPROCEDURE EVALUATION
Post-Op Assessment Note    CV Status:  Stable    Pain management: adequate     Mental Status:  Alert and awake   Hydration Status:  Euvolemic   PONV Controlled:  Controlled   Airway Patency:  Patent   Two or more mitigation strategies used for obstructive sleep apnea   Post Op Vitals Reviewed: Yes      Staff: Anesthesiologist, CRNA         No notable events documented      BP      Temp     Pulse     Resp      SpO2      /68 (BP Location: Right arm)   Pulse 75   Temp (!) 97 °F (36 1 °C) (Temporal)   Resp 16   Ht 6' (1 829 m)   Wt 76 7 kg (169 lb)   SpO2 99%   BMI 22 92 kg/m²

## 2023-02-24 NOTE — PROGRESS NOTES
[facility]  Progress Note - Lindsay Woodard 1934, 80 y o  male MRN: [id number]  Unit/Bed#: E4 -01 Encounter: [id number]  Primary Care Provider: Karen Cosme MD   Date and time admitted to hospital: 2/21/2023 11:29 AM    * New onset atrial fibrillation [facility])  Assessment & Plan  · With history of hypertension, hyperlipidemia, and hypothyroidism presented for elective TURP and possible suprapubic tube placement 2/21/2023 but was found to have rapid atrial fibrillation in OR holding  Patient asymptomatic  · Patient ready seen by cardiology, started on diltiazem gtt, short acting oral diltiazem and heparin infusion  · With chronic pedal edema, torsemide 10 mg daily started on admission as patient reported noncompliance and his PCP stopping medication due to constipation  · TSH within normal limits  · Echo with LVEF 42%, normal systolic function, W2TT  · Patient converted and remains in normal sinus rhythm  Cardizem gtt D/C'd, patient started on long acting Cardizem 120 mg 24 hr tablet  · S/p day #1 TURP-SPT  · Catheter bag with clear urine, no signs/symptoms of bleeding  · We will start Eliquis 5 mg twice daily - priced check with pharmacy and discussed with daughter   Script has been sent  · Monitor for hematuria and if clear patient should be stable for DC in the AM with White until next week  · Urology and cardiology follow up    Essential (primary) hypertension  Assessment & Plan  · Holding home Losartan - patient receiving Cardizem 120 mg 24 hr tablet and torsemide 10 mg daily with adequate BP control  · Monitor pressures    Gastro-esophageal reflux disease without esophagitis  Assessment & Plan  · Continue PPI    Hyperlipidemia  Assessment & Plan  · On PTA simvastatin, continue pravastatin    Peripheral vascular disease (HCC)  Assessment & Plan  · Continue statin    Hypothyroidism  Assessment & Plan  · Continue levothyroxine 125 mcg daily    BPH (benign prostatic hyperplasia)  Assessment & Plan  · S/p day #1 TURP-SPT placement  · D/C tamsulosin    Urinary tract infection  Assessment & Plan  · Patient's urine culture growing Enterococcus and Pseudomonas from 2/13/2023  · Currently with indwelling White catheter, asymptomatic, afebrile and without leukocytosis  · Given 5 day course of Ciprofloxacin per urology prior to admission  · Ciprofloxacin IV x 1 30 minutes prior to procedure yesterday  · Patient afebrile without leukocytosis  · Continue amoxicillin 500 mg every 8 hours x 2/5 days given Enterococcus growth and urine culture sensitivites      VTE Pharmacologic Prophylaxis: VTE Score: 3 Moderate Risk (Score 3-4) - Pharmacological DVT Prophylaxis Ordered: apixaban (Eliquis)  Patient Centered Rounds: I performed bedside rounds with nursing staff today  Discussions with Specialists or Other Care Team Provider: Urology, Cardiology    Education and Discussions with Family / Patient: Updated  (daughter) via phone  Total Time Spent on Date of Encounter in care of patient: 35 minutes This time was spent on one or more of the following: performing physical exam; counseling and coordination of care; obtaining or reviewing history; documenting in the medical record; reviewing/ordering tests, medications or procedures; communicating with other healthcare professionals and discussing with patient's family/caregivers  Current Length of Stay: 3 day(s)  Current Patient Status: Inpatient   Certification Statement: The patient will continue to require additional inpatient hospital stay due to monitoring hematuria, TURP post op  Discharge Plan: Anticipate discharge tomorrow to home with home services  Code Status: Level 1 - Full Code    Subjective:   Patient seen and examined  Reports he is feeling overall well but has a sore lower back from the way he has been sitting  He denies any fever, chills, chest pain, heart palpitations, or shortness of breath    He is without abdominal pain and has an appetite  Denies bleeding from where the suprapubic catheter is, increased bruising, or signs of bleeding in his urine bag  Objective:     Vitals:   Temp (24hrs), Av 1 °F (36 2 °C), Min:95 9 °F (35 5 °C), Max:98 2 °F (36 8 °C)    Temp:  [95 9 °F (35 5 °C)-98 2 °F (36 8 °C)] 98 2 °F (36 8 °C)  HR:  [65-80] 78  Resp:  [16-18] 16  BP: (101-151)/(53-68) 103/53  SpO2:  [95 %-99 %] 99 %  Body mass index is 22 92 kg/m²  Input and Output Summary (last 24 hours): Intake/Output Summary (Last 24 hours) at 2023 0965  Last data filed at 2023 0409  Gross per 24 hour   Intake 950 ml   Output -1800 ml   Net 2750 ml       Physical Exam:   Physical Exam  Vitals and nursing note reviewed  Constitutional:       General: He is not in acute distress  Appearance: Normal appearance  He is well-developed  He is not ill-appearing  Comments: Sitting up in bed eating breakfast   HENT:      Head: Normocephalic and atraumatic  Eyes:      Extraocular Movements: Extraocular movements intact  Conjunctiva/sclera: Conjunctivae normal    Cardiovascular:      Rate and Rhythm: Normal rate and regular rhythm  Heart sounds: Normal heart sounds  No murmur heard  Pulmonary:      Effort: Pulmonary effort is normal  No respiratory distress  Breath sounds: Normal breath sounds  No rales  Abdominal:      General: Bowel sounds are normal       Palpations: Abdomen is soft  Tenderness: There is no abdominal tenderness  Comments: Suprapubic catheter in place with dressing  C/D/I  No signs of bleeding or bruising   Genitourinary:     Comments: Catheter bag with clear to light yellow urine  No hematuria, clots or sediment noted  Musculoskeletal:      Right lower leg: No edema  Left lower leg: Edema present  Comments: Pedal edema   Skin:     General: Skin is warm and dry  Capillary Refill: Capillary refill takes less than 2 seconds        Coloration: Skin is not pale  Findings: No bruising  Neurological:      Mental Status: He is alert  Psychiatric:         Mood and Affect: Mood normal         Additional Data:     Labs:  Results from last 7 days   Lab Units 02/24/23  0458   WBC Thousand/uL 6 62   HEMOGLOBIN g/dL 11 6*   HEMATOCRIT % 33 9*   PLATELETS Thousands/uL 139*     Results from last 7 days   Lab Units 02/24/23  0458 02/23/23  0648 02/22/23  0503   SODIUM mmol/L 135   < > 137   POTASSIUM mmol/L 4 1   < > 4 4   CHLORIDE mmol/L 105   < > 106   CO2 mmol/L 25   < > 26   BUN mg/dL 12   < > 17   CREATININE mg/dL 1 03   < > 1 07   ANION GAP mmol/L 5   < > 5   CALCIUM mg/dL 8 6   < > 8 8   ALBUMIN g/dL  --   --  3 2*   TOTAL BILIRUBIN mg/dL  --   --  0 72   ALK PHOS U/L  --   --  43   ALT U/L  --   --  15   AST U/L  --   --  19   GLUCOSE RANDOM mg/dL 141*   < > 95    < > = values in this interval not displayed  Results from last 7 days   Lab Units 02/24/23  0458   INR  1 13     Lines/Drains:  Invasive Devices     Peripheral Intravenous Line  Duration           Peripheral IV 02/21/23 Dorsal (posterior); Right Forearm 2 days    Peripheral IV 02/21/23 Left;Upper;Ventral (anterior) Arm 2 days    Peripheral IV 02/21/23 Right;Upper;Ventral (anterior) Arm 2 days          Drain  Duration           Urethral Catheter Latex 18 Fr  128 days    Continuous Bladder Irrigation Three-way <1 day    Suprapubic Catheter 14 Fr  <1 day    Urethral Catheter Latex 22 Fr  <1 day              Urinary Catheter:  Goal for removal: Follow up outpatient with urology    Telemetry:  Telemetry Orders (From admission, onward)             48 Hour Telemetry Monitoring  Continuous x 48 hours        References:    Telemetry Guidelines   Question:  Reason for 48 Hour Telemetry  Answer:  Arrhythmias Requiring Medical Therapy (eg  SVT, Vtach/fib, Bradycardia, Uncontrolled A-fib)                 Telemetry Reviewed: Normal Sinus Rhythm  Indication for Continued Telemetry Use: Arrthymias requiring medical therapy    Imaging: Reviewed radiology reports from this admission including: ECHO    Recent Cultures (last 7 days):     Last 24 Hours Medication List:   Current Facility-Administered Medications   Medication Dose Route Frequency Provider Last Rate   • acetaminophen  650 mg Oral Q6H PRN Angelique Charles PA-C     • acetaminophen  650 mg Oral Q6H DE [facility] Dayana Bethea MD     • apixaban  5 mg Oral BID Angelique Charles PA-C     • belladonna-opium  1 suppository Rectal Q6H PRN Dayana Bethea MD     • ciprofloxacin  500 mg Oral Q12H Keo Loomis DO     • diltiazem  120 mg Oral Daily Curtis Rice PA-C     • lactated ringers  1,000 mL Intravenous Once PRN Dayana Bethea MD      And   • lactated ringers  1,000 mL Intravenous Once PRN Dayana Bethea MD     • lactated ringers  100 mL/hr Intravenous Continuous Kenneth Vaughan  mL/hr (02/23/23 1726)   • levothyroxine  125 mcg Oral Early Morning Angelique Charles PA-C     • lidocaine  1 patch Topical Daily Curtis Rice PA-C     • ondansetron  4 mg Intravenous Q6H PRN Dayana eBthea MD     • oxybutynin  5 mg Oral Q6H PRN Dayana Bethea MD     • pantoprazole  40 mg Oral Early Morning Angelique Charles PA-C     • pravastatin  40 mg Oral Daily With [employer]     • sodium chloride  1,000 mL Intravenous Once PRN Dayana Bethea MD      And   • sodium chloride  1,000 mL Intravenous Once PRN Dayana Bethea MD     • torsemide  10 mg Oral Daily Angelique Charles PA-C          Today, Patient Was Seen By: Angelique Charles PA-C    **Please Note: This note may have been constructed using a voice recognition system  **

## 2023-02-24 NOTE — PROGRESS NOTES
The ciprofloxacin has / have been converted to Oral per [facility] IV-to-PO Auto-Conversion Protocol for Adults as approved by the Pharmacy and Therapeutics Committee  The patient met all eligible criteria:  3 Age = 25years old   2) Received at least one dose of the IV form   3) Receiving at least one other scheduled oral/enteral medication   4) Tolerating an oral/enteral diet   and did not have any exclusions:   1) Critical care patient   2) Active GI bleed (IF assessing H2RAs or PPIs)   3) Continuous tube feeding (IF assessing cipro, doxycycline, levofloxacin, minocycline, rifampin, or voriconazole)   4) Receiving PO vancomycin (IF assessing metronidazole)   5) Persistent nausea and/or vomiting   6) Ileus or gastrointestinal obstruction   7) Riaz/nasogastric tube set for continuous suction   8) Specific order not to automatically convert to PO (in the order's comments or if discussed in the most recent Infectious Disease or primary team's progress notes)

## 2023-02-25 ENCOUNTER — TELEPHONE (OUTPATIENT)
Dept: OTHER | Facility: HOSPITAL | Age: 88
End: 2023-02-25

## 2023-02-25 VITALS
HEART RATE: 64 BPM | OXYGEN SATURATION: 99 % | BODY MASS INDEX: 22.89 KG/M2 | WEIGHT: 169 LBS | DIASTOLIC BLOOD PRESSURE: 54 MMHG | RESPIRATION RATE: 18 BRPM | TEMPERATURE: 97.3 F | HEIGHT: 72 IN | SYSTOLIC BLOOD PRESSURE: 102 MMHG

## 2023-02-25 LAB
ERYTHROCYTE [DISTWIDTH] IN BLOOD BY AUTOMATED COUNT: 13.6 % (ref 11.6–15.1)
HCT VFR BLD AUTO: 36 % (ref 36.5–49.3)
HGB BLD-MCNC: 12.3 G/DL (ref 12–17)
MCH RBC QN AUTO: 33.7 PG (ref 26.8–34.3)
MCHC RBC AUTO-ENTMCNC: 34.2 G/DL (ref 31.4–37.4)
MCV RBC AUTO: 99 FL (ref 82–98)
PLATELET # BLD AUTO: 153 THOUSANDS/UL (ref 149–390)
PMV BLD AUTO: 8.6 FL (ref 8.9–12.7)
RBC # BLD AUTO: 3.65 MILLION/UL (ref 3.88–5.62)
WBC # BLD AUTO: 6.79 THOUSAND/UL (ref 4.31–10.16)

## 2023-02-25 PROCEDURE — 85027 COMPLETE CBC AUTOMATED: CPT

## 2023-02-25 PROCEDURE — 99239 HOSP IP/OBS DSCHRG MGMT >30: CPT | Performed by: PHYSICIAN ASSISTANT

## 2023-02-25 PROCEDURE — 99024 POSTOP FOLLOW-UP VISIT: CPT | Performed by: NURSE PRACTITIONER

## 2023-02-25 RX ORDER — POLYETHYLENE GLYCOL 3350 17 G/17G
17 POWDER, FOR SOLUTION ORAL DAILY
Qty: 30 EACH | Refills: 0 | Status: SHIPPED | OUTPATIENT
Start: 2023-02-25

## 2023-02-25 RX ORDER — DILTIAZEM HYDROCHLORIDE 120 MG/1
120 CAPSULE, COATED, EXTENDED RELEASE ORAL DAILY
Qty: 90 CAPSULE | Refills: 0 | Status: SHIPPED | OUTPATIENT
Start: 2023-02-26 | End: 2023-03-07 | Stop reason: SDUPTHER

## 2023-02-25 RX ORDER — DOCUSATE SODIUM 100 MG/1
100 CAPSULE, LIQUID FILLED ORAL 2 TIMES DAILY
Qty: 90 CAPSULE | Refills: 0 | Status: SHIPPED | OUTPATIENT
Start: 2023-02-25

## 2023-02-25 RX ORDER — BISACODYL 5 MG/1
5 TABLET, DELAYED RELEASE ORAL ONCE
Status: COMPLETED | OUTPATIENT
Start: 2023-02-25 | End: 2023-02-25

## 2023-02-25 RX ORDER — AMOXICILLIN 500 MG/1
500 CAPSULE ORAL EVERY 8 HOURS SCHEDULED
Qty: 12 CAPSULE | Refills: 0 | Status: SHIPPED | OUTPATIENT
Start: 2023-02-25 | End: 2023-03-01

## 2023-02-25 RX ADMIN — ACETAMINOPHEN 325MG 650 MG: 325 TABLET ORAL at 12:12

## 2023-02-25 RX ADMIN — ACETAMINOPHEN 325MG 650 MG: 325 TABLET ORAL at 05:23

## 2023-02-25 RX ADMIN — BISACODYL 5 MG: 5 TABLET, COATED ORAL at 12:12

## 2023-02-25 RX ADMIN — DILTIAZEM HYDROCHLORIDE 120 MG: 120 CAPSULE, COATED, EXTENDED RELEASE ORAL at 08:26

## 2023-02-25 RX ADMIN — APIXABAN 5 MG: 5 TABLET, FILM COATED ORAL at 08:26

## 2023-02-25 RX ADMIN — TORSEMIDE 10 MG: 20 TABLET ORAL at 08:26

## 2023-02-25 RX ADMIN — LEVOTHYROXINE SODIUM 125 MCG: 25 TABLET ORAL at 05:24

## 2023-02-25 RX ADMIN — PANTOPRAZOLE SODIUM 40 MG: 40 TABLET, DELAYED RELEASE ORAL at 05:24

## 2023-02-25 RX ADMIN — AMOXICILLIN 500 MG: 500 CAPSULE ORAL at 05:24

## 2023-02-25 RX ADMIN — LIDOCAINE 5% 1 PATCH: 700 PATCH TOPICAL at 08:29

## 2023-02-25 RX ADMIN — DOCUSATE SODIUM 100 MG: 100 CAPSULE, LIQUID FILLED ORAL at 08:27

## 2023-02-25 NOTE — DISCHARGE SUMMARY
[address]  Discharge- [facility] 1934, 80 y o  male MRN: [id number]  Unit/Bed#: E4 -01 Encounter: [id number]  Primary Care Provider: Monique Evangelista MD   Date and time admitted to hospital: 2/21/2023 11:29 AM    * New onset atrial fibrillation [facility])  Assessment & Plan  · With history of hypertension, hyperlipidemia, and hypothyroidism presented for elective TURP and possible suprapubic tube placement 2/21/2023 but was found to have rapid atrial fibrillation in OR holding  Patient asymptomatic    · Seen in consult by cardiology  · Patient was initially placed on Cardizem drip with heparin drip, transitioned to oral Cardizem  · TSH within normal limits  · Echo with LVEF 70%, normal systolic function, E6FF  · Patient did spontaneously convert back to normal sinus rhythm  · Started on Eliquis, continue at discharge  · We will need outpatient cardiology follow-up    Urinary tract infection  Assessment & Plan  · Patient's urine culture growing Enterococcus and Pseudomonas from 2/13/2023  · Currently with indwelling White catheter, asymptomatic, afebrile and without leukocytosis  · Given 5 day course of Ciprofloxacin per urology prior to admission  · Ciprofloxacin IV x 1 30 minutes prior to procedure   · Continue amoxicillin 500 mg Q8 for 5 days    Peripheral vascular disease ([address] )  Assessment & Plan  · Continue statin    BPH (benign prostatic hyperplasia)  Assessment & Plan  · Patient is postop day 2 from TURP-SPT placement    Hypothyroidism  Assessment & Plan  · Continue levothyroxine 125 mcg daily    Hyperlipidemia  Assessment & Plan  · On PTA simvastatin, continue pravastatin    Gastro-esophageal reflux disease without esophagitis  Assessment & Plan  · Continue PPI    Essential (primary) hypertension  Assessment & Plan  · BP well controlled on torsemide 10 mg daily and Cardizem 120 mg q  24  · Losartan discontinued during admission      Medical Problems     Resolved Problems  Date Reviewed: 2/25/2023   None       Discharging Physician / Practitioner: Ward Ferguson PA-C  PCP: Rhiannon Sainz MD  Admission Date:   Admission Orders (From admission, onward)     Ordered        02/21/23 1926  Inpatient Admission  Once                      Discharge Date: 02/25/23    Consultations During Hospital Stay:  · Urology, cardiology    Procedures Performed:   · TURP with suprapubic tube placement  · Echocardiogram with EF 23%, grade 1 diastolic dysfunction    Significant Findings / Test Results:   · See above    Incidental Findings:   · See above      Test Results Pending at Discharge (will require follow up): · None     Outpatient Tests Requested:  · Outpatient urology follow-up within 1 week for White catheter removal  · Cardiology follow-up    Complications: None    Reason for Admission: New onset [address] [place] [address] Course:   Tong Brush is a 80 y o  male patient who originally presented to the hospital on 2/21/2023 due to atrial fibrillation  Patient presented to the hospital for elective TURP and was found to have rapid atrial fibrillation while in OR holding  Patient was seen in consult by cardiology and started on Cardizem infusion  He did spontaneously convert to normal sinus rhythm and was able to be transitioned to p o  Cardizem prior to discharge  He was placed on a heparin drip and transition to p o  Eliquis prior to discharge  He did undergo his TURP with suprapubic catheter placement  White catheter was inserted and draining clear yellow urine on day of discharge  Suprapubic catheter remained capped during hospitalization  He was scheduled with urology outpatient for White catheter removal and voiding trial   He was additionally restarted on his p o  torsemide prior to discharge  He was discharged home with home health  Please see above list of diagnoses and related plan for additional information       Condition at Discharge: stable    Discharge Day Visit / Exam:   Subjective: Patient seen and examined at bedside  Notes he is feeling well today  Due to is eating and drinking appropriately  He is unhappy to hear that he has to go home with his White catheter but is agreeable  Aware of plan for outpatient urology follow-up  Vitals: Blood Pressure: 102/54 (02/25/23 0736)  Pulse: 64 (02/25/23 0736)  Temperature: (!) 97 3 °F (36 3 °C) (02/25/23 0736)  Temp Source: Temporal (02/25/23 0736)  Respirations: 18 (02/25/23 0736)  Height: 6' (182 9 cm) (02/22/23 1042)  Weight - Scale: 76 7 kg (169 lb) (02/22/23 1042)  SpO2: 99 % (02/25/23 0736)  Exam:   Physical Exam  Vitals reviewed  Constitutional:       General: He is not in acute distress  HENT:      Head: Normocephalic and atraumatic  Eyes:      General: No scleral icterus  Conjunctiva/sclera: Conjunctivae normal    Cardiovascular:      Rate and Rhythm: Normal rate and regular rhythm  Heart sounds: No murmur heard  Pulmonary:      Effort: Pulmonary effort is normal  No respiratory distress  Breath sounds: Normal breath sounds  Abdominal:      General: Bowel sounds are normal  There is no distension  Palpations: Abdomen is soft  Tenderness: There is no abdominal tenderness  Genitourinary:     Comments: White catheter draining yellow urine  Musculoskeletal:      Cervical back: Neck supple  Right lower leg: No edema  Left lower leg: No edema  Skin:     General: Skin is warm and dry  Neurological:      General: No focal deficit present  Mental Status: He is alert  Mental status is at baseline  Psychiatric:         Mood and Affect: Mood normal          Behavior: Behavior normal           Discussion with Family: Updated  (daughter) via phone  Discharge instructions/Information to patient and family:   See after visit summary for information provided to patient and family        Provisions for Follow-Up Care:  See after visit summary for information related to follow-up care and any pertinent home health orders  Disposition:   Home with VNA Services (Reminder: Complete face to face encounter)    Planned Readmission: None     Discharge Statement:  I spent 35 minutes discharging the patient  This time was spent on the day of discharge  I had direct contact with the patient on the day of discharge  Greater than 50% of the total time was spent examining patient, answering all patient questions, arranging and discussing plan of care with patient as well as directly providing post-discharge instructions  Additional time then spent on discharge activities  Discharge Medications:  See after visit summary for reconciled discharge medications provided to patient and/or family        **Please Note: This note may have been constructed using a voice recognition system**

## 2023-02-25 NOTE — ASSESSMENT & PLAN NOTE
· BP well controlled on torsemide 10 mg daily and Cardizem 120 mg q  24  · Losartan discontinued during admission

## 2023-02-25 NOTE — PLAN OF CARE
Problem: PAIN - ADULT  Goal: Verbalizes/displays adequate comfort level or baseline comfort level  Description: Interventions:  - Encourage patient to monitor pain and request assistance  - Assess pain using appropriate pain scale  - Administer analgesics based on type and severity of pain and evaluate response  - Implement non-pharmacological measures as appropriate and evaluate response  - Consider cultural and social influences on pain and pain management  - Notify physician/advanced practitioner if interventions unsuccessful or patient reports new pain  Outcome: Progressing     Problem: INFECTION - ADULT  Goal: Absence or prevention of progression during hospitalization  Description: INTERVENTIONS:  - Assess and monitor for signs and symptoms of infection  - Monitor lab/diagnostic results  - Monitor all insertion sites, i e  indwelling lines, tubes, and drains  - Monitor endotracheal if appropriate and nasal secretions for changes in amount and color  - [place] appropriate cooling/warming therapies per order  - Administer medications as ordered  - Instruct and encourage patient and family to use good hand hygiene technique  - Identify and instruct in appropriate isolation precautions for identified infection/condition  Outcome: Progressing     Problem: SAFETY ADULT  Goal: Patient will remain free of falls  Description: INTERVENTIONS:  - Educate patient/family on patient safety including physical limitations  - Instruct patient to call for assistance with activity   - Consult OT/PT to assist with strengthening/mobility   - Keep Call bell within reach  - Keep bed low and locked with side rails adjusted as appropriate  - Keep care items and personal belongings within reach  - Initiate and maintain comfort rounds  - Make Fall Risk Sign visible to staff  - Offer Toileting every 2 Hours, in advance of need  - Initiate/Maintain bed alarm  - Obtain necessary fall risk management equipment: alarms  - Apply yellow socks and bracelet for high fall risk patients  - Consider moving patient to room near nurses station  Outcome: Progressing  Goal: Maintain or return to baseline ADL function  Description: INTERVENTIONS:  -  Assess patient's ability to carry out ADLs; assess patient's baseline for ADL function and identify physical deficits which impact ability to perform ADLs (bathing, care of mouth/teeth, toileting, grooming, dressing, etc )  - Assess/evaluate cause of self-care deficits   - Assess range of motion  - Assess patient's mobility; develop plan if impaired  - Assess patient's need for assistive devices and provide as appropriate  - Encourage maximum independence but intervene and supervise when necessary  - Involve family in performance of ADLs  - Assess for home care needs following discharge   - Consider OT consult to assist with ADL evaluation and planning for discharge  - Provide patient education as appropriate  Outcome: Progressing  Goal: Maintains/Returns to pre admission functional level  Description: INTERVENTIONS:  - Perform BMAT or MOVE assessment daily    - Set and communicate daily mobility goal to care team and patient/family/caregiver     - Collaborate with rehabilitation services on mobility goals if consulted  - Ambulate patient 3 times a day  - Out of bed to chair 3 times a day   - Out of bed for meals 3 times a day  - Out of bed for toileting  - Record patient progress and toleration of activity level   Outcome: Progressing     Problem: DISCHARGE PLANNING  Goal: Discharge to home or other facility with appropriate resources  Description: INTERVENTIONS:  - Identify barriers to discharge w/patient and caregiver  - Arrange for needed discharge resources and transportation as appropriate  - Identify discharge learning needs (meds, wound care, etc )  - Refer to Case Management Department for coordinating discharge planning if the patient needs post-hospital services based on physician/advanced practitioner order or complex needs related to functional status, cognitive ability, or social support system  Outcome: Progressing     Problem: Knowledge Deficit  Goal: Patient/family/caregiver demonstrates understanding of disease process, treatment plan, medications, and discharge instructions  Description: Complete learning assessment and assess knowledge base  Interventions:  - Provide teaching at level of understanding  - Provide teaching via preferred learning methods  Outcome: Progressing     Problem: MOBILITY - ADULT  Goal: Maintain or return to baseline ADL function  Description: INTERVENTIONS:  -  Assess patient's ability to carry out ADLs; assess patient's baseline for ADL function and identify physical deficits which impact ability to perform ADLs (bathing, care of mouth/teeth, toileting, grooming, dressing, etc )  - Assess/evaluate cause of self-care deficits   - Assess range of motion  - Assess patient's mobility; develop plan if impaired  - Assess patient's need for assistive devices and provide as appropriate  - Encourage maximum independence but intervene and supervise when necessary  - Involve family in performance of ADLs  - Assess for home care needs following discharge   - Consider OT consult to assist with ADL evaluation and planning for discharge  - Provide patient education as appropriate  Outcome: Progressing  Goal: Maintains/Returns to pre admission functional level  Description: INTERVENTIONS:  - Perform BMAT or MOVE assessment daily    - Set and communicate daily mobility goal to care team and patient/family/caregiver     - Collaborate with rehabilitation services on mobility goals if consulted  - Ambulate patient 3 times a day  - Out of bed to chair 3 times a day   - Out of bed for meals 3 times a day  - Out of bed for toileting  - Record patient progress and toleration of activity level   Outcome: Progressing     Problem: Prexisting or High Potential for Compromised Skin Integrity  Goal: Skin integrity is maintained or improved  Description: INTERVENTIONS:  - Identify patients at risk for skin breakdown  - Assess and monitor skin integrity  - Assess and monitor nutrition and hydration status  - Monitor labs   - Assess for incontinence   - Turn and reposition patient  - Assist with mobility/ambulation  - Relieve pressure over bony prominences  - Avoid friction and shearing  - Provide appropriate hygiene as needed including keeping skin clean and dry  - Evaluate need for skin moisturizer/barrier cream  - Collaborate with interdisciplinary team   - Patient/family teaching  - Consider wound care consult   Outcome: Progressing

## 2023-02-25 NOTE — TELEPHONE ENCOUNTER
Cali Hinton is an 27-year-old male status post TURP by Dr Yuliya Carter scheduled for White catheter removal 2/28  Daughter will not be able to make appointment  White catheter was to be in place for about a week  Service okay to postpone this visit  Please contact patient/caregiver to reschedule  Patient has suprapubic tube in place-- clamped --OLVINI  Thank you

## 2023-02-25 NOTE — ASSESSMENT & PLAN NOTE
· With history of hypertension, hyperlipidemia, and hypothyroidism presented for elective TURP and possible suprapubic tube placement 2/21/2023 but was found to have rapid atrial fibrillation in OR holding  Patient asymptomatic    · Seen in consult by cardiology  · Patient was initially placed on Cardizem drip with heparin drip, transitioned to oral Cardizem  · TSH within normal limits  · Echo with LVEF 27%, normal systolic function, J8CD  · Patient did spontaneously convert back to normal sinus rhythm  · Started on Eliquis, continue at discharge  · We will need outpatient cardiology follow-up

## 2023-02-25 NOTE — RESTORATIVE TECHNICIAN NOTE
Restorative Technician Note      Patient Name: Ida Andersen     [facility] [place] Visit Date: 02/25/23  Note Type: Mobility  Assistive Device: Roller walker  Patient Position at End of Consult: Bedside chair;  All needs within reach; Bed/Chair alarm activated          NS

## 2023-02-25 NOTE — ASSESSMENT & PLAN NOTE
· Patient's urine culture growing Enterococcus and Pseudomonas from 2/13/2023  · Currently with indwelling White catheter, asymptomatic, afebrile and without leukocytosis  · Given 5 day course of Ciprofloxacin per urology prior to admission  · Ciprofloxacin IV x 1 30 minutes prior to procedure   · Continue amoxicillin 500 mg Q8 for 5 days

## 2023-02-25 NOTE — PROGRESS NOTES
Progress Note - Chidi Mcknight 80 y o  male MRN: [id number]    Unit/Bed#: E4 -01 Encounter: [id number]      Assessment:  Chidi Mcknight is an 59-year-old male with BPH and gross hematuria while anticoagulated postoperative day 2 TURP with insertion of suprapubic tube  Afebrile, hemodynamically stable and without complaints of pain  White catheter to straight drainage--patent for grossly clear yellow urine  Suprapubic tube clamped  Normal white count and renal function  Plan:  · Discharge at the discretion of the internal medicine service  · Keep White catheter to straight drainage  · Maintain suprapubic tube clamped  · Patient will be contacted for White catheter removal outpatient in approximately 1 week  · He will later begin bladder retraining with SP tube as backup  · No further  intervention indicated during this hospital stay  Subjective:   No complaints    Objective:     Vitals: Blood pressure 102/54, pulse 64, temperature (!) 97 3 °F (36 3 °C), temperature source Temporal, resp  rate 18, height 6' (1 829 m), weight 76 7 kg (169 lb), SpO2 99 %  ,Body mass index is 22 92 kg/m²        Intake/Output Summary (Last 24 hours) at 2/25/2023 1046  Last data filed at 2/25/2023 0801  Gross per 24 hour   Intake 660 ml   Output 2850 ml   Net -2190 ml       Physical Exam: General appearance: alert and oriented, in no acute distress, appears stated age, cooperative and no distress  Head: Normocephalic, without obvious abnormality, atraumatic  Neck: no adenopathy, no carotid bruit, no JVD, supple, symmetrical, trachea midline and thyroid not enlarged, symmetric, no tenderness/mass/nodules  Lungs: diminished breath sounds  Heart: regular rate and rhythm, S1, S2 normal, no murmur, click, rub or gallop  Abdomen: soft, non-tender; bowel sounds normal; no masses,  no organomegaly  Extremities: extremities normal, warm and well-perfused; no cyanosis, clubbing, or edema  Pulses: 2+ and symmetric  Neurologic: Grossly normal  White--grossly clear yellow urine     Invasive Devices     Peripheral Intravenous Line  Duration           Peripheral IV 02/21/23 Dorsal (posterior); Right Forearm 3 days          Drain  Duration           Suprapubic Catheter 14 Fr  1 day    Urethral Catheter Latex 22 Fr  1 day              Lab Results   Component Value Date    WBC 6 79 02/25/2023    HGB 12 3 02/25/2023    HCT 36 0 (L) 02/25/2023    MCV 99 (H) 02/25/2023     02/25/2023     Lab Results   Component Value Date    SODIUM 135 02/24/2023    K 4 1 02/24/2023     02/24/2023    CO2 25 02/24/2023    BUN 12 02/24/2023    CREATININE 1 03 02/24/2023    GLUC 141 (H) 02/24/2023    CALCIUM 8 6 02/24/2023     Lab, Imaging and other studies: I have personally reviewed pertinent reports

## 2023-02-25 NOTE — CASE MANAGEMENT
Case Management Discharge Planning Note    Patient name Figueroa Beaulieu  Location [facility] 4 /E4  [phone]-* MRN [id number]  : 1934 Date 2023       Current Admission Date: 2023  Current Admission Diagnosis:New onset atrial fibrillation [facility])   Patient Active Problem List    Diagnosis Date Noted   • Urinary tract infection 2023   • New onset atrial fibrillation ([facility] [address] ) 2023   • BPH (benign prostatic hyperplasia)    • Peripheral vascular disease ([facility] [address] )    • Preoperative clearance 2022   • Drug-induced constipation 2022   • Cognitive decline 10/26/2022   • Hyponatremia 10/20/2022   • Status post fall 10/19/2022   • Urinary retention 10/19/2022   • History of fracture of right hip 10/19/2022   • Lumbar compression fracture ([facility] [address] ) 10/19/2022   • Abnormal urinalysis 10/18/2022   • Disorientation 10/18/2022   • Hydronephrosis with urinary obstruction due to renal calculus 10/18/2022   • Essential (primary) hypertension 10/08/2022   • Gastro-esophageal reflux disease without esophagitis 10/08/2022   • Hyperlipidemia 10/08/2022   • Polyneuropathy, peripheral sensorimotor axonal    • Hypothyroidism 2007      LOS (days): 4  Geometric Mean LOS (GMLOS) (days): 1 80  Days to GMLOS:-1 7     OBJECTIVE:  Risk of Unplanned Readmission Score: 15 42         Current admission status: Inpatient   Preferred Pharmacy:   [facility] DR JOSEPH Knapp  [address] - [address]  [address]    [place] [address]  Phone: [phone] Fax: [phone] [address]   [address] [facility] [address]  Phone: [phone] Fax: [phone]    Primary Care Provider: Redd Senior MD    Primary Insurance: [address] [place] [address]  Secondary Insurance:     DISCHARGE DETAILS:    [address] [place] [address]         Is the patient interested in [address] at discharge?: Yes  V[address] Vera requested[de-identified] Nursing, Occupational Therapy, Physical Therapy  Home Health Agency Name[de-identified] Other ([facility])  [address] Provider[de-identified] PCP  Home Health Services Needed[de-identified] Strengthening/Theraputic Exercises to Improve Function, Evaluate Functional Status and Safety, Gait/ADL Training  Homebound Criteria Met[de-identified] Requires the Assistance of Another Person for Safe Ambulation or to Leave the Home  Supporting Clincal Findings[de-identified] Fatigues Easliy in [employer], Limited Endurance    Other Referral/Resources/Interventions Provided:  Referral Comments: Referral for SARAH had been sent to [facility] via Aidin per Aidin reports  Able to resume services at time of discharge

## 2023-02-25 NOTE — PLAN OF CARE
Problem: GENITOURINARY - ADULT  Goal: Urinary catheter remains patent  Description: INTERVENTIONS:  - Assess patency of urinary catheter  - If patient has a chronic kelly, consider changing catheter if non-functioning  - Follow guidelines for intermittent irrigation of non-functioning urinary catheter  Outcome: Progressing

## 2023-02-27 PROCEDURE — 88305 TISSUE EXAM BY PATHOLOGIST: CPT | Performed by: PATHOLOGY

## 2023-02-27 NOTE — UTILIZATION REVIEW
NOTIFICATION OF ADMISSION DISCHARGE   This is a Notification of Discharge from [facility]  Please be advised that this patient has been discharge from our facility  Below you will find the admission and discharge date and time including the patient’s disposition  UTILIZATION REVIEW CONTACT:  Augustine Gomez  Utilization   Network Utilization Review Department  Phone: [phone] x carefully listen to the prompts  All voicemails are confidential   Email: [email or web address]     ADMISSION INFORMATION  PRESENTATION DATE: 2/21/2023 11:29 AM  OBERVATION ADMISSION DATE:   INPATIENT ADMISSION DATE: 2/21/23  7:26 PM   DISCHARGE DATE: 2/25/2023  1:30 PM   DISPOSITION:Home/Self Care    IMPORTANT INFORMATION:  Send all requests for admission clinical reviews, approved or denied determinations and any other requests to dedicated fax number below belonging to the campus where the patient is receiving treatment   List of dedicated fax numbers:  [address] DENIALS (Administrative/Medical Necessity) [phone]   [address] (Maternity/NICU/Pediatrics) [phone]   [facility] [phone]   [facility] [phone]   [facility] [address] [phone]   [facility] [phone]   [address] [place] [address] [phone]   [facility] 119 [phone]   [facility]  [phone]   [facility] [phone]   [address] [facility] [address] [facility] [phone]

## 2023-03-01 ENCOUNTER — PROCEDURE VISIT (OUTPATIENT)
Dept: UROLOGY | Facility: MEDICAL CENTER | Age: 88
End: 2023-03-01

## 2023-03-01 VITALS
BODY MASS INDEX: 22.75 KG/M2 | WEIGHT: 168 LBS | SYSTOLIC BLOOD PRESSURE: 130 MMHG | HEART RATE: 73 BPM | DIASTOLIC BLOOD PRESSURE: 74 MMHG | HEIGHT: 72 IN

## 2023-03-01 DIAGNOSIS — N40.1 BPH WITH URINARY OBSTRUCTION: Primary | ICD-10-CM

## 2023-03-01 DIAGNOSIS — N13.8 BPH WITH URINARY OBSTRUCTION: Primary | ICD-10-CM

## 2023-03-01 NOTE — PROGRESS NOTES
3/1/2023    [place]  1934  [id number]    Diagnosis  Chief Complaint    BPH with urinary retention         Patient presents for White removal/SPT plug diary teaching s/p  TURP WITH SPT INSERTION on 2/23/23 with  Dr Arabella Nguyen  Pt's daughter present and given verbal and written instructions as pt had trouble with the directions  Pt's daughter stated pt has a   Advised that  the caregiver be given the instructions and should contact the office with any questions of if further instructions are needed  Pt states he is having trouble moving his bowels despite taking stool softeners and Miralax 2 times a day  Advised he drink at least 40 oz of water a day and drink some prune juice with fiber added to his diet  Dr Arabella Nguyen was consulted and stated pt can try a Dulcolax suppository in addition  Plan  Return in 2 weeks for sx fu with SPT diary review    Procedure White removal/SPT Plug diary teaching    White catheter removed after deflation of an intact balloon  Patient tolerated well  Encouraged patient to hydrate well     UnCap SPT  In morning and again at night try to urinate then open SP tube and let it drain into graduate until it stops draining  Attempt to urinate every 2-4 hours or as often as you need to go  Write down date, time and amount drained     Clamp tube and start cycle again  If unable to void after 4 hours, drain SP tube and record amount  You do not need to wake up during night unless you have urge    Vitals:    03/01/23 0849   BP: 130/74   Pulse: 73   Weight: 76 2 kg (168 lb)   Height: 6' (1 829 m)           Viviana Alvarez RN

## 2023-03-02 ENCOUNTER — TELEPHONE (OUTPATIENT)
Dept: UROLOGY | Facility: MEDICAL CENTER | Age: 88
End: 2023-03-02

## 2023-03-02 NOTE — TELEPHONE ENCOUNTER
Spoke to pt's daughter  She reported that pt's constipation has not improved  Advised per Dr Pj Montilla to try a Dulcolax suppository in addition to the P O  Dulcolax and Miralax he is already taking    They will follow up with us

## 2023-03-06 ENCOUNTER — NURSE TRIAGE (OUTPATIENT)
Dept: OTHER | Facility: OTHER | Age: 88
End: 2023-03-06

## 2023-03-06 NOTE — TELEPHONE ENCOUNTER
Returned call to Visiting RN   Advised per providers recommendations  She will advised patient, monitor for changes and contact our office

## 2023-03-06 NOTE — TELEPHONE ENCOUNTER
Sounds he's doing OK pretty good for 2 weeks postop which is when some scabs tend to come off and trigger hematuria again  Sounds resolving on its own with hydration agree  No need for urine testing unless fevers dysuria etc  Keep SPT diary ongoing  Bang Meneses

## 2023-03-06 NOTE — TELEPHONE ENCOUNTER
Copied from Health call encounter   Axel Johnston RN       11:34 AM  Note       Regarding: Small amounts of blood in urine  ----- Message from Lynne Lewis RN sent at 3/6/2023 10:57 AM EST -----  Small amounts of hematuria 2 weeks after surgery   Suprapubic catheter draining twice a day otherwise urinating through penis

## 2023-03-06 NOTE — PROGRESS NOTES
Cardiology Follow Up    Reinier Liu  1934  [id number]  [address] [facility] [place] [id number] [phone]    1  Paroxysmal atrial fibrillation (HCC)  POCT ECG    torsemide (DEMADEX) 10 mg tablet    diltiazem (CARDIZEM CD) 120 mg 24 hr capsule      2  Essential (primary) hypertension  torsemide (DEMADEX) 10 mg tablet      3  Acquired hypothyroidism        4  Hyperlipidemia, unspecified hyperlipidemia type        5  Bilateral leg edema            Interval History:   Mr Reinier Liu was admitted to [place] [id number] on 2/21 - 2/25/23 with new onset atrial fibrillation  He was admitted to an elective TURP and found to have atrial fibrillation while in the OR holding area  Cardiology consulted  He was treated with IV Cardizem and spontaneously converted to NSR  He was transitioned to po Cardizem  TSH 3 199 Eliquis started prior to discharge  He underwent TURP with SP Catheter placement  SP cath capped during hospitalization  2/22/23 TTE: LV size normal, LVEF 45%, systolic function normal   Grade 1 abnormal relaxation  RV mod dilated,  RA mildly dilated, Mild MR, mild to mod TR   RV systolic pressure [id number]  He was restarted on Torsemide prior to discharge  Mr Reinier Liu presents to our office for a recent hospitalization follow up visit  He is accompanied by his daughter  Rosenda Bernardns admits to occasional pink tinged urine  He denies CP, palpitations, lightheadedness or dizziness  He ran out of Cardizem CD and Torsemide 2 days ago  He continues on Eliquis        Medical History   Hypertension  Hyperlipidemia  Hypothyroidism  PVD  BPH  Urinary retention     Patient Active Problem List   Diagnosis   • Polyneuropathy, peripheral sensorimotor axonal   • Abnormal urinalysis   • Disorientation   • Hydronephrosis with urinary obstruction due to renal calculus   • Essential (primary) hypertension • Gastro-esophageal reflux disease without esophagitis   • Hyperlipidemia   • Hypothyroidism   • Status post fall   • Urinary retention   • History of fracture of right hip   • Lumbar compression fracture (HCC)   • Hyponatremia   • Cognitive decline   • Drug-induced constipation   • Preoperative clearance   • BPH (benign prostatic hyperplasia)   • Peripheral vascular disease (HCC)   • New onset atrial fibrillation (HCC)   • Urinary tract infection     Past Medical History:   Diagnosis Date   • BPH (benign prostatic hyperplasia)    • GERD (gastroesophageal reflux disease)    • Hyperlipidemia    • Hypertension    • Hypothyroidism    • Kidney stone    • [facility]    • Walker as ambulation aid      Social History     Socioeconomic History   • Marital status: /Civil Union     Spouse name: Not on file   • Number of children: Not on file   • Years of education: Not on file   • Highest education level: Not on file   Occupational History   • Not on file   Tobacco Use   • Smoking status: Former     Types: Cigars, Pipe     Quit date: 1970     Years since quittin 2   • Smokeless tobacco: Never   Vaping Use   • Vaping Use: Never used   Substance and Sexual Activity   • Alcohol use: Not Currently   • Drug use: Never   • Sexual activity: Not Currently   Other Topics Concern   • Not on file   Social History Narrative   • Not on file     Social Determinants of Health     Financial Resource Strain: Not on file   Food Insecurity: No Food Insecurity   • Worried About Running Out of Food in the Last Year: Never true   • Ran Out of Food in the Last Year: Never true   Transportation Needs: No Transportation Needs   • Lack of Transportation (Medical): No   • Lack of Transportation (Non-Medical):  No   Physical Activity: Not on file   Stress: Not on file   Social Connections: Not on file   Intimate Partner Violence: Not on file   Housing Stability: Low Risk    • Unable to Pay for Housing in the Last Year: No   • Number of Places Lived in the Last Year: 1   • Unstable Housing in the Last Year: No      No family history on file    Past Surgical History:   Procedure Laterality Date   • COLONOSCOPY     • FL RETROGRADE PYELOGRAM  10/18/2022   • JOINT REPLACEMENT Right 10/2022    hip   • NC CYSTO BLADDER W/URETERAL CATHETERIZATION Left 10/18/2022    Procedure: CYSTOSCOPY RETROGRADE PYELOGRAM WITH INTERPRETATION AND WITH INSERTION STENT URETERAL;  Surgeon: Mariam Reyes MD;  Location[facility];  Service: Urology   • NC CYSTO W/INSERT URETERAL STENT Left 11/15/2022    Procedure: EXCHANGE STENT URETERAL;  Surgeon: Tiffanie Xiong MD;  Location: [facility] MAIN OR;  Service: Urology   • NC CYSTO/URETERO W/LITHOTRIPSY &INDWELL STENT INSRT Left 11/15/2022    Procedure: CYSTOSCOPY URETEROSCOPY WITH LITHOTRIPSY HOLMIUM LASER, RETROGRADE PYELOGRAM AND INSERTION STENT URETERAL;  Surgeon: Tiffanie Xiong MD;  Location: [facility] MAIN OR;  Service: Urology   • SUPRAPUBIC TUBE PLACEMENT N/A 2/23/2023    Procedure: INSERTION SUPRAPUBIC CATHETER PERCUTANEOUS;  Surgeon: Mariposa Calle MD;  Location: [facility];  Service: Urology   • TRANSURETHRAL RESECTION OF PROSTATE N/A 2/23/2023    Procedure: (TURP), SPT insertion;  Surgeon: Mariposa Calle MD;  Location: [facility];  Service: Urology       Current Outpatient Medications:   •  apixaban (Eliquis) 5 mg, Take 1 tablet (5 mg total) by mouth 2 (two) times a day, Disp: 60 tablet, Rfl: 0  •  diltiazem (CARDIZEM CD) 120 mg 24 hr capsule, Take 1 capsule (120 mg total) by mouth daily Do not start before February 26, 2023 , Disp: 90 capsule, Rfl: 0  •  docusate sodium (COLACE) 100 mg capsule, Take 1 capsule (100 mg total) by mouth 2 (two) times a day, Disp: 90 capsule, Rfl: 0  •  levothyroxine 125 mcg tablet, Take 125 mcg by mouth every morning, Disp: , Rfl:   •  omeprazole (PriLOSEC) 20 mg delayed release capsule, Take 20 mg by mouth daily in the early morning, Disp: , Rfl:   •  polyethylene glycol (MIRALAX) 17 g packet, Take 17 g by mouth daily, Disp: 30 each, Rfl: 0  •  simvastatin (ZOCOR) 40 mg tablet, Take 1 tablet by mouth daily at bedtime, Disp: , Rfl:   •  torsemide (DEMADEX) 10 mg tablet, Take 1 tablet by mouth daily, Disp: , Rfl:   No Known Allergies    Labs:  Admission on 02/21/2023, Discharged on 02/25/2023   Component Date Value   • SARS-CoV-2 02/21/2023 Negative    • INFLUENZA A PCR 02/21/2023 Negative    • INFLUENZA B PCR 02/21/2023 Negative    • RSV PCR 02/21/2023 Negative    • Ventricular Rate 02/21/2023 122    • Atrial Rate 02/21/2023 166    • QRSD Interval 02/21/2023 78    • QT Interval 02/21/2023 318    • QTC Interval 02/21/2023 453    • QRS Axis 02/21/2023 -59    • T Wave Axis 02/21/2023 96    • WBC 02/21/2023 5 12    • RBC 02/21/2023 3 63 (L)    • Hemoglobin 02/21/2023 12 1    • Hematocrit 02/21/2023 35 7 (L)    • MCV 02/21/2023 98    • MCH 02/21/2023 33 3    • MCHC 02/21/2023 33 9    • RDW 02/21/2023 13 3    • Platelets 50/88/7832 168    • MPV 02/21/2023 8 9    • PTT 02/21/2023 31    • Protime 02/21/2023 15 4 (H)    • INR 02/21/2023 1 22 (H)    • A4C EF 02/22/2023 59    • LVIDd 02/22/2023 4 60    • LVIDS 02/22/2023 3 20    • IVSd 02/22/2023 0 90    • LVPWd 02/22/2023 1 00    • FS 02/22/2023 30    • MV E' Tissue Velocity Se* 02/22/2023 9    • MV E' Tissue Velocity La* 02/22/2023 9    • E wave deceleration time 02/22/2023 219    • MV Peak E Kevyn 02/22/2023 81    • MV Peak A Kevyn 02/22/2023 0 94    • RVID d 02/22/2023 5 6    • Tricuspid annular plane * 02/22/2023 2 70    • LA size 02/22/2023 3 4    • LA length (A2C) 02/22/2023 5 30    • MT A4C 02/22/2023 14 4    • RAA A4C 02/22/2023 20 1    • LVOT diameter 02/22/2023 2 0    • MV stenosis pressure 1/2* 02/22/2023 64    • MV valve area p 1/2 meth* 02/22/2023 3 44    • TR Peak Kevyn 02/22/2023 3 0    • Triscuspid Valve Regurgi* 02/22/2023 36 0    • Ao root 02/22/2023 3 40    • Tricuspid valve peak reg* 02/22/2023 2 98    • Left ventricular stroke * 02/22/2023 57 00    • IVS 02/22/2023 0 9    • LEFT VENTRICLE SYSTOLIC * 74/71/4973 42    • LV DIASTOLIC VOLUME (MOD* 82/69/6646 98    • Left Atrium Area-systoli* 02/22/2023 13 3    • Left Atrium Area-systoli* 02/22/2023 19 6    • LVSV, 2D 02/22/2023 57    • LVOT area 02/22/2023 3 14    • LV EF 02/22/2023 60    • Est  RA pres 02/22/2023 3 0    • Right Ventricular Peak S* 02/22/2023 39 00    • PTT 02/22/2023 98 (H)    • WBC 02/22/2023 5 86    • RBC 02/22/2023 3 62 (L)    • Hemoglobin 02/22/2023 12 2    • Hematocrit 02/22/2023 36 1 (L)    • MCV 02/22/2023 100 (H)    • MCH 02/22/2023 33 7    • MCHC 02/22/2023 33 8    • RDW 02/22/2023 13 4    • Platelets 15/39/5319 162    • MPV 02/22/2023 8 8 (L)    • Sodium 02/22/2023 137    • Potassium 02/22/2023 4 4    • Chloride 02/22/2023 106    • CO2 02/22/2023 26    • ANION GAP 02/22/2023 5    • BUN 02/22/2023 17    • Creatinine 02/22/2023 1 07    • Glucose 02/22/2023 95    • Calcium 02/22/2023 8 8    • Corrected Calcium 02/22/2023 9 4    • AST 02/22/2023 19    • ALT 02/22/2023 15    • Alkaline Phosphatase 02/22/2023 43    • Total Protein 02/22/2023 5 5 (L)    • Albumin 02/22/2023 3 2 (L)    • Total Bilirubin 02/22/2023 0 72    • eGFR 02/22/2023 61    • TSH 3RD GENERATON 02/22/2023 3 199    • PTT 02/22/2023 70 (H)    • PTT 02/22/2023 50 (H)    • PTT 02/22/2023 131 (HH)    • Sodium 02/23/2023 137    • Potassium 02/23/2023 4 1    • Chloride 02/23/2023 107    • CO2 02/23/2023 26    • ANION GAP 02/23/2023 4    • BUN 02/23/2023 16    • Creatinine 02/23/2023 1 05    • Glucose 02/23/2023 103    • Calcium 02/23/2023 8 9    • eGFR 02/23/2023 63    • WBC 02/23/2023 4 82    • RBC 02/23/2023 3 65 (L)    • Hemoglobin 02/23/2023 12 2    • Hematocrit 02/23/2023 35 7 (L)    • MCV 02/23/2023 98    • MCH 02/23/2023 33 4    • MCHC 02/23/2023 34 2    • RDW 02/23/2023 13 3    • Platelets 23/62/1566 147 (L)    • MPV 02/23/2023 8 7 (L)    • PTT 02/23/2023 143 (HH)    • Case Report 02/23/2023 Value:Surgical Pathology Report                         Case: [id number]                                   Authorizing Provider:  Valerie Krueger MD           Collected:           02/23/2023 1636              Ordering Location:     [facility]        Received:            02/23/2023 [address] [facility] Operating Room                                                     Pathologist:           Joyce Arguelles DO                                                            Specimen:    Prostate, Prostate Tissue                                                                 • Final Diagnosis 02/23/2023                      Value: This result contains rich text formatting which cannot be displayed here  • Additional Information 02/23/2023                      Value: This result contains rich text formatting which cannot be displayed here  • Gross Description 02/23/2023                      Value: This result contains rich text formatting which cannot be displayed here     • WBC 02/24/2023 6 62    • RBC 02/24/2023 3 43 (L)    • Hemoglobin 02/24/2023 11 6 (L)    • Hematocrit 02/24/2023 33 9 (L)    • MCV 02/24/2023 99 (H)    • MCH 02/24/2023 33 8    • MCHC 02/24/2023 34 2    • RDW 02/24/2023 13 4    • Platelets 33/42/8368 139 (L)    • MPV 02/24/2023 8 5 (L)    • Sodium 02/24/2023 135    • Potassium 02/24/2023 4 1    • Chloride 02/24/2023 105    • CO2 02/24/2023 25    • ANION GAP 02/24/2023 5    • BUN 02/24/2023 12    • Creatinine 02/24/2023 1 03    • Glucose 02/24/2023 141 (H)    • Calcium 02/24/2023 8 6    • eGFR 02/24/2023 64    • Protime 02/24/2023 14 5    • INR 02/24/2023 1 13    • WBC 02/25/2023 6 79    • RBC 02/25/2023 3 65 (L)    • Hemoglobin 02/25/2023 12 3    • Hematocrit 02/25/2023 36 0 (L)    • MCV 02/25/2023 99 (H)    • MCH 02/25/2023 33 7    • MCHC 02/25/2023 34 2    • RDW 02/25/2023 13 6    • Platelets 78/64/2476 153    • MPV 02/25/2023 8 6 (L)    Lab Requisition on 02/13/2023   Component Date Value   • ABO Grouping 02/13/2023 A    • Rh Factor 02/13/2023 Positive    • Antibody Screen 02/13/2023 Negative    • Specimen Expiration Date 02/13/2023 [id number]    Office Visit on 02/13/2023   Component Date Value   • Ventricular Rate 02/13/2023 68    • Atrial Rate 02/13/2023 68    • LA Interval 02/13/2023 164    • QRSD Interval 02/13/2023 96    • QT Interval 02/13/2023 382    • QTC Interval 02/13/2023 406    • P Axis 02/13/2023 38    • QRS Axis 02/13/2023 -2    • T Wave [employer] 02/13/2023 67    Appointment on 02/13/2023   Component Date Value   • Sodium 02/13/2023 139    • Potassium 02/13/2023 4 8    • Chloride 02/13/2023 103    • CO2 02/13/2023 27    • ANION GAP 02/13/2023 9    • BUN 02/13/2023 23    • Creatinine 02/13/2023 1 39 (H)    • Glucose, Fasting 02/13/2023 102 (H)    • Calcium 02/13/2023 9 2    • eGFR 02/13/2023 44    • WBC 02/13/2023 5 83    • RBC 02/13/2023 4 05    • Hemoglobin 02/13/2023 13 5    • Hematocrit 02/13/2023 40 8    • MCV 02/13/2023 101 (H)    • MCH 02/13/2023 33 3    • MCHC 02/13/2023 33 1    • RDW 02/13/2023 13 1    • MPV 02/13/2023 9 3    • Platelets 41/40/3696 205    • nRBC 02/13/2023 0    • Neutrophils Relative 02/13/2023 51    • Immat GRANS % 02/13/2023 0    • Lymphocytes Relative 02/13/2023 38    • Monocytes Relative 02/13/2023 9    • Eosinophils Relative 02/13/2023 2    • Basophils Relative 02/13/2023 0    • Neutrophils Absolute 02/13/2023 2 99    • Immature Grans Absolute 02/13/2023 0 01    • Lymphocytes Absolute 02/13/2023 2 21    • Monocytes Absolute 02/13/2023 0 51    • Eosinophils Absolute 02/13/2023 0 09    • Basophils Absolute 02/13/2023 0 02      Imaging: Echo complete w/ contrast if indicated    Result Date: 2/22/2023  Narrative: •  Left Ventricle: Left ventricular cavity size is normal  Wall thickness is normal  The left ventricular ejection fraction is 60%   Systolic function is normal  Wall motion is normal  Diastolic function is mildly abnormal, consistent with grade I (abnormal) relaxation  •  Right Ventricle: Right ventricular cavity size is moderately dilated  •  Right Atrium: The atrium is mildly dilated  •  Aortic Valve: The aortic valve has no significant stenosis  •  Mitral Valve: There is mild regurgitation  •  Tricuspid Valve: There is mild to moderate regurgitation  The right ventricular systolic pressure is mildly elevated  The estimated right ventricular systolic pressure is 96 69 mmHg  Review of Systems:  Review of Systems   Constitutional: Positive for fatigue  HENT: Positive for hearing loss  Cardiovascular: Positive for leg swelling  Genitourinary: Positive for hematuria  Musculoskeletal: Positive for arthralgias, gait problem and myalgias  Using a walker to assist with ambulation         Physical Exam:  Physical Exam  Vitals reviewed  Constitutional:       Appearance: Normal appearance  Cardiovascular:      Rate and Rhythm: Normal rate and regular rhythm  Pulses: Normal pulses  Heart sounds: Normal heart sounds  Pulmonary:      Effort: Pulmonary effort is normal       Breath sounds: Normal breath sounds  Abdominal:      General: Bowel sounds are normal       Palpations: Abdomen is soft  Musculoskeletal:         General: Normal range of motion  Cervical back: Normal range of motion  Right lower leg: Edema present  Left lower leg: Edema present  Comments: 1+ oscar LE ankle edema    Skin:     General: Skin is warm and dry  Capillary Refill: Capillary refill takes less than 2 seconds  Neurological:      General: No focal deficit present  Mental Status: He is alert and oriented to person, place, and time  Psychiatric:         Mood and Affect: Mood normal          Behavior: Behavior normal          Discussion/Summary:  1  Paroxysmal atrial fibrillation [id number]= 3 ( age, HTN) continue on Eliquis 5mg BID for stroke prevention    Educated on monitoring for SS of bleeding and when to seek medical attention  Samples provided  EKG shows NSR 70 BPM  Restart Cardizem CD 120mg daily, refill sent to pharmacy today        2  Hypertension RUE sitting 120/60, restart Cardizem CD 120mg daily, daily home BP monitoring goals -130, DASH diet   3  Hypothyroidism TSH WNL continue on Levothyroxine 125mcg daily   4  [place] LE edema, probable some component of HFpEF  Ran out of Torsemide, Refill sent to pharmacy, instructed to take Torsemide tomorrow and when needed for bilLE edema  5  Hyperlipidemia continue on Simvastatin 40mg daily, no further testing needed due to age

## 2023-03-06 NOTE — TELEPHONE ENCOUNTER
Returned call to Visiting Nurse   SPT draining 2 times a day 150 cc residual Voding via urethra   Last week drainage was reported as light pink to cranberry  Per RN may be intermittent  The weekend reported by RN intermittent clots yesterday morning  Has not spoken to the family today, not sure if he still has any clots in his urine  SPT drainage is clear to yellowish  No fever, chills or pain reported  Next visit in our office is for 3/13/23  Is voiding via your urethra   Constipation has resolved , has been taking dulcolax and was able to have multiple bowel movements Friday and Saturday  Patient is currently on Eliquis     Visiting RN was not sure if he should have urine testing due to reported hematuria  She is going out to the home tomorrow for a visit  Will send to provider for recommendation

## 2023-03-06 NOTE — TELEPHONE ENCOUNTER
Regarding: Small amounts of blood in urine  ----- Message from Vandana Rivers RN sent at 3/6/2023 10:57 AM EST -----  Small amounts of hematuria 2 weeks after surgery   Suprapubic catheter draining twice a day otherwise urinating through penis

## 2023-03-07 ENCOUNTER — OFFICE VISIT (OUTPATIENT)
Dept: CARDIOLOGY CLINIC | Facility: CLINIC | Age: 88
End: 2023-03-07

## 2023-03-07 VITALS
OXYGEN SATURATION: 99 % | HEIGHT: 72 IN | HEART RATE: 70 BPM | BODY MASS INDEX: 23.07 KG/M2 | SYSTOLIC BLOOD PRESSURE: 118 MMHG | WEIGHT: 170.3 LBS | DIASTOLIC BLOOD PRESSURE: 56 MMHG

## 2023-03-07 DIAGNOSIS — I10 ESSENTIAL (PRIMARY) HYPERTENSION: ICD-10-CM

## 2023-03-07 DIAGNOSIS — E78.5 HYPERLIPIDEMIA, UNSPECIFIED HYPERLIPIDEMIA TYPE: ICD-10-CM

## 2023-03-07 DIAGNOSIS — I48.0 PAROXYSMAL ATRIAL FIBRILLATION (HCC): Primary | ICD-10-CM

## 2023-03-07 DIAGNOSIS — E03.9 ACQUIRED HYPOTHYROIDISM: ICD-10-CM

## 2023-03-07 DIAGNOSIS — R60.0 BILATERAL LEG EDEMA: ICD-10-CM

## 2023-03-07 RX ORDER — TORSEMIDE 10 MG/1
TABLET ORAL
Qty: 30 TABLET | Refills: 3 | Status: SHIPPED | OUTPATIENT
Start: 2023-03-07

## 2023-03-07 RX ORDER — DILTIAZEM HYDROCHLORIDE 120 MG/1
120 CAPSULE, COATED, EXTENDED RELEASE ORAL DAILY
Qty: 90 CAPSULE | Refills: 0 | Status: SHIPPED | OUTPATIENT
Start: 2023-03-07

## 2023-03-07 NOTE — PATIENT INSTRUCTIONS
Take a torsemide in am 3/08/23 and when needed for LE edema   Re start Cardizem CD 120mg daily  Home BP monitoring goal -130

## 2023-03-13 ENCOUNTER — OFFICE VISIT (OUTPATIENT)
Dept: UROLOGY | Facility: MEDICAL CENTER | Age: 88
End: 2023-03-13

## 2023-03-13 VITALS
DIASTOLIC BLOOD PRESSURE: 58 MMHG | BODY MASS INDEX: 22.27 KG/M2 | HEART RATE: 52 BPM | HEIGHT: 72 IN | SYSTOLIC BLOOD PRESSURE: 116 MMHG | WEIGHT: 164.4 LBS

## 2023-03-13 DIAGNOSIS — R33.9 URINARY RETENTION WITH INCOMPLETE BLADDER EMPTYING: ICD-10-CM

## 2023-03-13 DIAGNOSIS — N13.8 BPH WITH URINARY OBSTRUCTION: Primary | ICD-10-CM

## 2023-03-13 DIAGNOSIS — N40.1 BPH WITH URINARY OBSTRUCTION: Primary | ICD-10-CM

## 2023-03-13 NOTE — PROGRESS NOTES
Now 2 weeks after TURP and temporary SP tube in February 2023  Voiding  small amounts frequently, occasional burning, urine is orange to light brown in color no bad gross hematuria    He is open the tube every time he voids, residuals are 100 to 200 mL    He will now open the tube only once per day, before bed      Follow-up 1 week, if residuals still acceptable, remove tube at that visit

## 2023-03-20 ENCOUNTER — PROCEDURE VISIT (OUTPATIENT)
Dept: UROLOGY | Facility: MEDICAL CENTER | Age: 88
End: 2023-03-20

## 2023-03-20 ENCOUNTER — TELEPHONE (OUTPATIENT)
Dept: UROLOGY | Facility: MEDICAL CENTER | Age: 88
End: 2023-03-20

## 2023-03-20 VITALS
HEIGHT: 72 IN | BODY MASS INDEX: 21.67 KG/M2 | WEIGHT: 160 LBS | DIASTOLIC BLOOD PRESSURE: 50 MMHG | SYSTOLIC BLOOD PRESSURE: 102 MMHG | HEART RATE: 81 BPM

## 2023-03-20 DIAGNOSIS — R31.0 GROSS HEMATURIA: ICD-10-CM

## 2023-03-20 DIAGNOSIS — N40.1 BPH WITH URINARY OBSTRUCTION: Primary | ICD-10-CM

## 2023-03-20 DIAGNOSIS — R31.0 GROSS HEMATURIA: Primary | ICD-10-CM

## 2023-03-20 DIAGNOSIS — N13.8 BPH WITH URINARY OBSTRUCTION: Primary | ICD-10-CM

## 2023-03-20 LAB
POST-VOID RESIDUAL VOLUME, ML POC: 136 ML
POST-VOID RESIDUAL VOLUME, ML POC: 58 ML
SL AMB  POCT GLUCOSE, UA: NORMAL
SL AMB LEUKOCYTE ESTERASE,UA: NORMAL
SL AMB POCT BILIRUBIN,UA: NORMAL
SL AMB POCT BLOOD,UA: NORMAL
SL AMB POCT CLARITY,UA: CLEAR
SL AMB POCT COLOR,UA: NORMAL
SL AMB POCT KETONES,UA: NORMAL
SL AMB POCT NITRITE,UA: POSITIVE
SL AMB POCT PH,UA: 7
SL AMB POCT SPECIFIC GRAVITY,UA: 1.03
SL AMB POCT URINE PROTEIN: NORMAL
SL AMB POCT UROBILINOGEN: 0.2

## 2023-03-20 NOTE — PROGRESS NOTES
3/20/2023    [facility] Ramal  1934  [id number]    Diagnosis  Chief Complaint    BPH with Urinary Retention; Gross Hematuria         Patient presents for SPT Plug Diary assessment and possible SPT removal s/p TURP with SPT insertion on 2/23/23  managed by Dr Mena King  Return in one week for SPT Plug Diary Assessment and possible SPT removal     Pt's plug diary showed zero residual for the entire previous week  Dr Kelly Bess was consulted and advised irrigation of tube  Tube unable to be irrigated due to blockage  Dr Kelly Bess ordered the SPT to be changed and for pt to return in one week for another assessment  Pt will continue to open the SPT only once per day  UA performed  Per Dr Leta Yoo is not needed    Procedure     Indwelling SPT catheter gently removed after deflation of intact balloon and cutting of suture     Patient prepped with Betadine and 14 fr  catheter inserted using sterile technique  8 mls of sterile water inflated into balloon  Catheter was plugged  Abbie Rogers SPT site cleansed with betadine and triple antibiotic ointment applied to stoma  Split gauze applied  Patient tolerated procedure well  Patient encouraged to maintain adequate fluid intake      Recent Results (from the past 4 hour(s))   POCT urine dip auto non-scope    Collection Time: 03/20/23 12:52 PM   Result Value Ref Range     COLOR,UA pink     CLARITY,UA clear     SPECIFIC GRAVITY,UA 1 030      PH,UA 7 0     LEUKOCYTE ESTERASE,UA large     NITRITE,UA positive     GLUCOSE, UA neg     KETONES,UA neg     BILIRUBIN,UA neg     BLOOD,UA large     POCT URINE PROTEIN 30 mg/dl     SL AMB POCT UROBILINOGEN 0 2    POCT Measure PVR    Collection Time: 03/20/23 12:54 PM   Result Value Ref Range    POST-VOID RESIDUAL VOLUME, ML  mL   POCT Measure PVR    Collection Time: 03/20/23 12:54 PM   Result Value Ref Range    POST-VOID RESIDUAL VOLUME, ML POC 58 mL           Vitals:    03/20/23 1113   BP: 102/50   Pulse: 81   Weight: 72 6 kg (160 lb)   Height: 6' (1 829 m)           Alfredo Alpers, RN

## 2023-03-20 NOTE — PROGRESS NOTES
Cystostomy tube change     Date/Time 3/20/2023 1:04 PM     Performed by  Kennedy Jean Baptiste RN     Authorized by Shivam Osuna MD      Universal Protocol   Consent: Verbal consent obtained    Risks and benefits: risks, benefits and alternatives were discussed  Consent given by: patient  Patient understanding: patient states understanding of the procedure being performed  Patient identity confirmed: verbally with patient        Local anesthesia used: no     Anesthesia   Local anesthesia used: no     Procedure Details   Procedure Notes: SPT attached to plug

## 2023-03-27 ENCOUNTER — PROCEDURE VISIT (OUTPATIENT)
Dept: UROLOGY | Facility: MEDICAL CENTER | Age: 88
End: 2023-03-27

## 2023-03-27 VITALS
WEIGHT: 163 LBS | BODY MASS INDEX: 22.08 KG/M2 | DIASTOLIC BLOOD PRESSURE: 64 MMHG | SYSTOLIC BLOOD PRESSURE: 128 MMHG | HEART RATE: 60 BPM | HEIGHT: 72 IN

## 2023-03-27 DIAGNOSIS — N13.8 BPH WITH URINARY OBSTRUCTION: Primary | ICD-10-CM

## 2023-03-27 DIAGNOSIS — N40.1 BPH WITH URINARY OBSTRUCTION: Primary | ICD-10-CM

## 2023-03-27 NOTE — PROGRESS NOTES
3/27/2023    Soren Murray  1934  [id number]    Diagnosis  Chief Complaint    BPH with urinary obstruction         Patient presents for SPT removal s/p TURP with SPT Insertion on 2/23/23 with Dr Carter Overall  Plan  Return in one week for PVR with nurse    Procedure SPT Removal    SPT catheter removed after deflation of an intact balloon  Patient tolerated well  Encouraged patient to hydrate well  Dr Carter Overall reviewed pt's SPT residuals diary and order catheter to be removed  Residuals all under 120 mls  Pt advised to contact the office if he cannot void within 4-6 hours or with fever, chills or any other concerns      Vitals:    03/27/23 1308   BP: 128/64   Pulse: 60   Weight: 73 9 kg (163 lb)   Height: 6' (1 829 m)           Jannie Fitzgerald RN

## 2023-04-04 ENCOUNTER — PROCEDURE VISIT (OUTPATIENT)
Dept: UROLOGY | Facility: MEDICAL CENTER | Age: 88
End: 2023-04-04

## 2023-04-04 VITALS
SYSTOLIC BLOOD PRESSURE: 122 MMHG | DIASTOLIC BLOOD PRESSURE: 62 MMHG | HEIGHT: 72 IN | WEIGHT: 162 LBS | BODY MASS INDEX: 21.94 KG/M2 | HEART RATE: 76 BPM

## 2023-04-04 DIAGNOSIS — R33.9 URINARY RETENTION WITH INCOMPLETE BLADDER EMPTYING: Primary | ICD-10-CM

## 2023-04-04 LAB — POST-VOID RESIDUAL VOLUME, ML POC: 83 ML

## 2023-04-04 NOTE — PROGRESS NOTES
4/4/2023  Saad Euceda is a 80 y o  male  [id number]    Diagnosis:  Chief Complaint    Urinary Retention         Patient presents for follow up post void residual s/p SPT removal managed by Dr Cramer Figures:  Return to the office for follow up as scheduled        Assessment:  Bladder was scanned with a bladder scanner for 83mL's  Vitals:    04/04/23 1114   BP: 122/62   BP Location: Left arm   Patient Position: Sitting   Cuff Size: Standard   Pulse: 76   Weight: 73 5 kg (162 lb)   Height: 6' (1 829 m)           Patient voided in the office    Post void residual measured via bladder scanner to be 83 mL    Recent Results (from the past 6 hour(s))   POCT Measure PVR    Collection Time: 04/04/23 11:22 AM   Result Value Ref Range    POST-VOID RESIDUAL VOLUME, ML POC 83 mL         Lynne Shore RN

## 2023-04-24 PROBLEM — N39.0 URINARY TRACT INFECTION: Status: RESOLVED | Noted: 2023-02-23 | Resolved: 2023-04-24

## 2023-10-03 PROBLEM — I48.0 PAROXYSMAL ATRIAL FIBRILLATION (HCC): Status: ACTIVE | Noted: 2023-02-21

## 2023-10-03 NOTE — PROGRESS NOTES
Cardiology Consultation     Sarah Beyer  [id number]  1934  [facility] [place]  [address]  [place] [address]      1. Paroxysmal atrial fibrillation (HCC)        2. Chronic diastolic heart failure ([address])        3. Essential (primary) hypertension        4. Mixed hyperlipidemia        5. Peripheral vascular disease (HCC)            Discussion/Summary:  Paroxysmal atrial fibrillation  - Anticoagulant Eliquis 5 mg twice daily  - Rate control with diltiazem 120 mg daily  Chronic heart failure with preserved ejection fraction  - TTE 2/2023 showed EF 60%, grade 1 DD, moderately dilated RV with normal function, mild RA, mild MR, mild to moderate TR, estimated PA pressure 39 mmHg  - Continue torsemide 10 mg daily  - Has some mild lower extreme edema which is relatively stable  Hypertension  - Continue with diltiazem 120 mg daily and losartan 50 mg daily  -Blood pressure well controlled today  Hyperlipidemia  - Continue with simvastatin 40 mg daily  - No recent lipid profile  Peripheral vascular disease  - Carotid ultrasound 7/25/2022 showed 50 to 69% stenosis of the right ICA, less than 50% stenosis of the left ICA  GERD  Hypothyroidism  BPH    Follow-up in 6 months. History of Present Illness:  Sarah Beyer is a 80y.o. year old male with a past medical history of paroxysmal atrial fibrillation, hypertension, hyperlipidemia, PVD, GERD, BPH and hypothyroidism. He reports feeling well today and has no complaints. Denies any episodes chest pain, palpitations, shortness of breath, or other cardiac symptoms. He does have some chronic lower extremity edema which he reports is relatively stable.     Patient Active Problem List   Diagnosis   • Polyneuropathy, peripheral sensorimotor axonal   • Abnormal urinalysis   • Disorientation   • Hydronephrosis with urinary obstruction due to renal calculus   • Essential (primary) hypertension   • Gastro-esophageal reflux disease without esophagitis • Hyperlipidemia   • Hypothyroidism   • Status post fall   • Urinary retention   • History of fracture of right hip   • Lumbar compression fracture (HCC)   • Hyponatremia   • Cognitive decline   • Drug-induced constipation   • Preoperative clearance   • BPH (benign prostatic hyperplasia)   • Peripheral vascular disease (HCC)   • Paroxysmal atrial fibrillation (HCC)     Past Medical History:   Diagnosis Date   • BPH (benign prostatic hyperplasia)    • GERD (gastroesophageal reflux disease)    • Hyperlipidemia    • Hypertension    • Hypothyroidism    • Kidney stone    • Peripheral vascular disease ([address])    • Walker as ambulation aid      Social History     Socioeconomic History   • Marital status: /Civil Union     Spouse name: Not on file   • Number of children: Not on file   • Years of education: Not on file   • Highest education level: Not on file   Occupational History   • Not on file   Tobacco Use   • Smoking status: Former     Types: Cigars, Pipe     Quit date: 1970     Years since quittin.7   • Smokeless tobacco: Never   Vaping Use   • Vaping Use: Never used   Substance and Sexual Activity   • Alcohol use: Not Currently   • Drug use: Never   • Sexual activity: Not Currently   Other Topics Concern   • Not on file   Social History Narrative   • Not on file     Social Determinants of Health     Financial Resource Strain: Not on file   Food Insecurity: No Food Insecurity (10/19/2022)    Hunger Vital Sign    • Worried About Running Out of Food in the Last Year: Never true    • Ran Out of Food in the Last Year: Never true   Transportation Needs: No Transportation Needs (10/19/2022)    PRAPARE - Transportation    • Lack of Transportation (Medical): No    • Lack of Transportation (Non-Medical):  No   Physical Activity: Not on file   Stress: Not on file   Social Connections: Not on file   Intimate Partner Violence: Not on file   Housing Stability: Low Risk  (10/19/2022)    Housing Stability Vital Sign    • Unable to Pay for Housing in the Last Year: No    • Number of Places Lived in the Last Year: 1    • Unstable Housing in the Last Year: No      History reviewed. No pertinent family history.   Past Surgical History:   Procedure Laterality Date   • COLONOSCOPY     • FL RETROGRADE PYELOGRAM  10/18/2022   • JOINT REPLACEMENT Right 10/2022    hip   • TX CYSTO BLADDER W/URETERAL CATHETERIZATION Left 10/18/2022    Procedure: CYSTOSCOPY RETROGRADE PYELOGRAM WITH INTERPRETATION AND WITH INSERTION STENT URETERAL;  Surgeon: Olivia Rider MD;  [facility]: [facility];  Service: Urology   • TX CYSTO W/INSERT URETERAL STENT Left 11/15/2022    Procedure: EXCHANGE STENT URETERAL;  Surgeon: Robbi Duenas MD;  Location: [address] [place] [address] MAIN OR;  Service: Urology   • [place] CYSTO/URETERO W/LITHOTRIPSY &INDWELL STENT INSRT Left 11/15/2022    Procedure: CYSTOSCOPY URETEROSCOPY WITH LITHOTRIPSY HOLMIUM LASER, RETROGRADE PYELOGRAM AND INSERTION STENT URETERAL;  Surgeon: Robbi Duenas MD;  Location: [address] [place] [address] MAIN OR;  Service: Urology   • SUPRAPUBIC TUBE PLACEMENT N/A 2/23/2023    Procedure: INSERTION SUPRAPUBIC CATHETER PERCUTANEOUS;  Surgeon: Syeda Mendez MD;  [facility];  Service: Urology   • TRANSURETHRAL RESECTION OF PROSTATE N/A 2/23/2023    Procedure: (TURP), SPT insertion;  Surgeon: Syeda Mendez MD;  [facility];  Service: Urology       Current Outpatient Medications:   •  amoxicillin (AMOXIL) 500 mg capsule, Take 2,000 mg by mouth as needed 1 hour prior to dental appointment, Disp: , Rfl:   •  apixaban (Eliquis) 5 mg, Take 1 tablet (5 mg total) by mouth 2 (two) times a day, Disp: 60 tablet, Rfl: 0  •  diltiazem (CARDIZEM CD) 120 mg 24 hr capsule, Take 1 capsule (120 mg total) by mouth daily, Disp: 90 capsule, Rfl: 0  •  levothyroxine 125 mcg tablet, Take 125 mcg by mouth every morning, Disp: , Rfl:   •  losartan (COZAAR) 50 mg tablet, , Disp: , Rfl:   •  omeprazole (PriLOSEC) 20 mg delayed release capsule, Take 20 mg by mouth daily in the early morning, Disp: , Rfl:   •  polyethylene glycol (MIRALAX) 17 g packet, Take 17 g by mouth daily, Disp: 30 each, Rfl: 0  •  simvastatin (ZOCOR) 40 mg tablet, Take 1 tablet by mouth daily at bedtime, Disp: , Rfl:   •  docusate sodium (COLACE) 100 mg capsule, Take 1 capsule (100 mg total) by mouth 2 (two) times a day (Patient not taking: Reported on 10/4/2023), Disp: 90 capsule, Rfl: 0  •  torsemide (DEMADEX) 10 mg tablet, Take when needed for edema in feet or legs. , Disp: 30 tablet, Rfl: 3  No Known Allergies      Labs:  Lab Results   Component Value Date    ALT 15 02/22/2023    AST 19 02/22/2023    BUN 12 02/24/2023    CALCIUM 8.6 02/24/2023     02/24/2023    CO2 25 02/24/2023    CREATININE 1.03 02/24/2023    HCT 36.0 (L) 02/25/2023    HGB 12.3 02/25/2023     02/25/2023    K 4.1 02/24/2023    WBC 6.79 02/25/2023       Imaging: No results found. ECG: 3/7/2023: Sinus rhythm, LAD    Review of Systems:  Review of Systems   Constitutional: Negative for chills, diaphoresis, fatigue and fever. HENT: Negative for congestion. Eyes: Negative for photophobia and visual disturbance. Respiratory: Negative for chest tightness and shortness of breath. Cardiovascular: Positive for leg swelling. Negative for chest pain and palpitations. Gastrointestinal: Negative for abdominal distention, abdominal pain, diarrhea, nausea and vomiting. Genitourinary: Negative for difficulty urinating and dysuria. Musculoskeletal: Negative for arthralgias, gait problem and joint swelling. Skin: Negative for color change, pallor and rash. Neurological: Negative for dizziness, syncope, numbness and headaches. Psychiatric/Behavioral: Negative for agitation, behavioral problems and confusion.          Vitals:    10/04/23 1022   BP: 110/58   Pulse: 76   SpO2: 98%      Vitals:    10/04/23 1022   Weight: 75.4 kg (166 lb 3.2 oz)     Height: 6' (182.9 cm)     Physical Exam:  General appearance: Appears stated age, alert, well appearing and in no distress  HEENT:  PERRLA, EOMI, no scleral icterus, no conjunctival pallor  NECK:  Supple, No elevated JVP, no thyromegaly, no carotid bruits  HEART:  Regular rate and rhythm, normal F6/O0, 3/6 systolic murmur  LUNGS:  Clear to auscultation bilaterally  ABDOMEN:  Soft, non-tender, positive bowel sounds, no rebound or guarding, no organomegaly   EXTREMITIES: 1+ bilateral lower extremity edema  VASCULAR:  Normal pedal pulses   SKIN: No lesions or rashes on exposed skin  NEURO:  CN II-XII intact, no focal deficits

## 2023-10-04 ENCOUNTER — OFFICE VISIT (OUTPATIENT)
Dept: CARDIOLOGY CLINIC | Facility: CLINIC | Age: 88
End: 2023-10-04
Payer: COMMERCIAL

## 2023-10-04 VITALS
SYSTOLIC BLOOD PRESSURE: 110 MMHG | HEIGHT: 72 IN | HEART RATE: 76 BPM | DIASTOLIC BLOOD PRESSURE: 58 MMHG | BODY MASS INDEX: 22.51 KG/M2 | OXYGEN SATURATION: 98 % | WEIGHT: 166.2 LBS

## 2023-10-04 DIAGNOSIS — I73.9 PERIPHERAL VASCULAR DISEASE (HCC): ICD-10-CM

## 2023-10-04 DIAGNOSIS — I10 ESSENTIAL (PRIMARY) HYPERTENSION: ICD-10-CM

## 2023-10-04 DIAGNOSIS — E78.2 MIXED HYPERLIPIDEMIA: ICD-10-CM

## 2023-10-04 DIAGNOSIS — I50.32 CHRONIC DIASTOLIC HEART FAILURE (HCC): ICD-10-CM

## 2023-10-04 DIAGNOSIS — I48.0 PAROXYSMAL ATRIAL FIBRILLATION (HCC): Primary | ICD-10-CM

## 2023-10-04 PROCEDURE — 99214 OFFICE O/P EST MOD 30 MIN: CPT | Performed by: STUDENT IN AN ORGANIZED HEALTH CARE EDUCATION/TRAINING PROGRAM

## 2023-10-04 RX ORDER — AMOXICILLIN 500 MG/1
2000 CAPSULE ORAL AS NEEDED
COMMUNITY
Start: 2023-07-07

## 2023-10-04 RX ORDER — LOSARTAN POTASSIUM 50 MG/1
TABLET ORAL
COMMUNITY
Start: 2023-08-07

## 2023-10-09 ENCOUNTER — OFFICE VISIT (OUTPATIENT)
Dept: UROLOGY | Facility: MEDICAL CENTER | Age: 88
End: 2023-10-09
Payer: COMMERCIAL

## 2023-10-09 VITALS
BODY MASS INDEX: 22.8 KG/M2 | HEART RATE: 71 BPM | OXYGEN SATURATION: 98 % | SYSTOLIC BLOOD PRESSURE: 110 MMHG | HEIGHT: 72 IN | WEIGHT: 168.3 LBS | DIASTOLIC BLOOD PRESSURE: 72 MMHG

## 2023-10-09 DIAGNOSIS — R33.9 URINARY RETENTION WITH INCOMPLETE BLADDER EMPTYING: Primary | ICD-10-CM

## 2023-10-09 DIAGNOSIS — N40.1 BPH WITH URINARY OBSTRUCTION: ICD-10-CM

## 2023-10-09 DIAGNOSIS — N13.8 BPH WITH URINARY OBSTRUCTION: ICD-10-CM

## 2023-10-09 LAB
POST-VOID RESIDUAL VOLUME, ML POC: 48 ML
SL AMB  POCT GLUCOSE, UA: ABNORMAL
SL AMB LEUKOCYTE ESTERASE,UA: ABNORMAL
SL AMB POCT BILIRUBIN,UA: ABNORMAL
SL AMB POCT BLOOD,UA: ABNORMAL
SL AMB POCT CLARITY,UA: ABNORMAL
SL AMB POCT COLOR,UA: YELLOW
SL AMB POCT KETONES,UA: ABNORMAL
SL AMB POCT NITRITE,UA: POSITIVE
SL AMB POCT PH,UA: 7
SL AMB POCT SPECIFIC GRAVITY,UA: 1.02
SL AMB POCT URINE PROTEIN: ABNORMAL
SL AMB POCT UROBILINOGEN: 1

## 2023-10-09 PROCEDURE — 51798 US URINE CAPACITY MEASURE: CPT | Performed by: UROLOGY

## 2023-10-09 PROCEDURE — 81003 URINALYSIS AUTO W/O SCOPE: CPT | Performed by: UROLOGY

## 2023-10-09 PROCEDURE — 99213 OFFICE O/P EST LOW 20 MIN: CPT | Performed by: UROLOGY

## 2023-10-09 NOTE — PROGRESS NOTES
HISTORY:    Doing well now, after small TURP February 2023    6 g BPH    Had retention preop but is voiding well now, PVR only 48 mL today         ASSESSMENT / PLAN:    Doing well  Follow-up 1 year    The following portions of the patient's history were reviewed and updated as appropriate: allergies, current medications, past family history, past medical history, past social history, past surgical history, and problem list.    Review of Systems      Objective:     Physical Exam      No results found for: "PSA"]  BUN   Date Value Ref Range Status   02/24/2023 12 5 - 25 mg/dL Final     Creatinine   Date Value Ref Range Status   02/24/2023 1.03 0.60 - 1.30 mg/dL Final     Comment:     Standardized to IDMS reference method     No components found for: "CBC"      Patient Active Problem List   Diagnosis    Polyneuropathy, peripheral sensorimotor axonal    Abnormal urinalysis    Disorientation    Hydronephrosis with urinary obstruction due to renal calculus    Essential (primary) hypertension    Gastro-esophageal reflux disease without esophagitis    Hyperlipidemia    Hypothyroidism    Status post fall    Urinary retention    History of fracture of right hip    Lumbar compression fracture (HCC)    Hyponatremia    Cognitive decline    Drug-induced constipation    Preoperative clearance    BPH (benign prostatic hyperplasia)    Peripheral vascular disease ([address])    Paroxysmal atrial fibrillation ([address])                   Her       Patient ID: Darell Ortiz is a 80 y.o. male.       Current Outpatient Medications:     amoxicillin (AMOXIL) 500 mg capsule, Take 2,000 mg by mouth as needed 1 hour prior to dental appointment, Disp: , Rfl:     apixaban (Eliquis) 5 mg, Take 1 tablet (5 mg total) by mouth 2 (two) times a day, Disp: 60 tablet, Rfl: 0    diltiazem (CARDIZEM CD) 120 mg 24 hr capsule, Take 1 capsule (120 mg total) by mouth daily, Disp: 90 capsule, Rfl: 0    docusate sodium (COLACE) 100 mg capsule, Take 1 capsule (100 mg total) by mouth 2 (two) times a day (Patient not taking: Reported on 10/4/2023), Disp: 90 capsule, Rfl: 0    levothyroxine 125 mcg tablet, Take 125 mcg by mouth every morning, Disp: , Rfl:     losartan (COZAAR) 50 mg tablet, , Disp: , Rfl:     omeprazole (PriLOSEC) 20 mg delayed release capsule, Take 20 mg by mouth daily in the early morning, Disp: , Rfl:     polyethylene glycol (MIRALAX) 17 g packet, Take 17 g by mouth daily, Disp: 30 each, Rfl: 0    simvastatin (ZOCOR) 40 mg tablet, Take 1 tablet by mouth daily at bedtime, Disp: , Rfl:     torsemide (DEMADEX) 10 mg tablet, Take when needed for edema in feet or legs. , Disp: 30 tablet, Rfl: 3    Past Medical History:   Diagnosis Date    BPH (benign prostatic hyperplasia)     GERD (gastroesophageal reflux disease)     Hyperlipidemia     Hypertension     Hypothyroidism     Kidney stone     Peripheral vascular disease ([address])     Tracey Yazdanism as ambulation aid        Past Surgical History:   Procedure Laterality Date    COLONOSCOPY      FL RETROGRADE PYELOGRAM  10/18/2022    JOINT REPLACEMENT Right 10/2022    hip    [place] CYSTO BLADDER W/URETERAL CATHETERIZATION Left 10/18/2022    Procedure: CYSTOSCOPY RETROGRADE PYELOGRAM WITH INTERPRETATION AND WITH INSERTION STENT URETERAL;  Surgeon: Caron Gastelum MD;  Location:  MAIN OR;  Service: Urology    N[place] CYSTO W/INSERT URETERAL STENT Left 11/15/2022    Procedure: EXCHANGE STENT URETERAL;  Surgeon: Gerhardt Mu, MD;  Location: [facility] MAIN OR;  Service: Urology    N[place] CYSTO/URETERO W/LITHOTRIPSY &INDWELL STENT INSRT Left 11/15/2022    Procedure: CYSTOSCOPY URETEROSCOPY WITH LITHOTRIPSY HOLMIUM LASER, RETROGRADE PYELOGRAM AND INSERTION STENT URETERAL;  Surgeon: Gerhardt Mu, MD;  Location: [facility] MAIN OR;  Service: Urology    SUPRAPUBIC TUBE PLACEMENT N/A 2/23/2023    Procedure: INSERTION SUPRAPUBIC CATHETER PERCUTANEOUS;  Surgeon: Johana Cowan MD;  [facility];  Service: Urology    TRANSURETHRAL RESECTION OF PROSTATE N/A 2/23/2023    Procedure: (TURP), SPT insertion;  Surgeon: Anibal Spring MD;  Location: [facility];  Service: Urology       Social History

## 2024-02-20 NOTE — PROGRESS NOTES
Cardiology Consultation     Masoud Steward  [id number]  5/9/1934  [facility] [place]  [address] [facility] [address]      1. Paroxysmal atrial fibrillation (HCC)        2. Chronic diastolic heart failure (HCC)        3. Essential (primary) hypertension        4. Mixed hyperlipidemia        5. Peripheral vascular disease (HCC)              Discussion/Summary:  Paroxysmal atrial fibrillation  - Anticoagulant Eliquis 5 mg twice daily  - Rate control with diltiazem 120 mg daily  Chronic heart failure with preserved ejection fraction  - TTE 2/22/2023 showed EF 60%, grade 1 DD, moderately dilated RV with normal function, mild RA, mild MR, mild to moderate TR, estimated PA pressure 39 mmHg  - Continue torsemide 10 mg daily  -Continues to have some trace bilateral lower extremity edema, overall appears euvolemic  Hypertension  - Continue with diltiazem 120 mg daily and losartan 50 mg daily  -Blood pressure well controlled today  Hyperlipidemia  - Continue with simvastatin 40 mg daily  - No recent lipid profile  Peripheral vascular disease  - Carotid ultrasound 7/25/2022 showed 50 to 69% stenosis of the right ICA, less than 50% stenosis of the left ICA  GERD  Hypothyroidism  BPH    Follow-up in 6 months.    History of Present Illness:  Masoud Steward is a 89 y.o. year old male with a past medical history of paroxysmal atrial fibrillation, hypertension, hyperlipidemia, PVD, GERD, BPH and hypothyroidism.    Patient had [employer] screening on 2/13/2024.  Results showed mild bilateral carotid stenosis, no atrial fibrillation, normal STEPHANIE, borderline CRP at 1.6, and no AAA.  He reports feeling well today and has no complaints.  Denies any episodes of chest pain, palpitations, lower extremity edema, shortness of breath, or other concerning cardiac symptoms at rest or with exertion.  Denies any major bleeding or blood in his stool.    Patient Active Problem List   Diagnosis    Polyneuropathy, peripheral  sensorimotor axonal    Abnormal urinalysis    Disorientation    Hydronephrosis with urinary obstruction due to renal calculus    Essential (primary) hypertension    Gastro-esophageal reflux disease without esophagitis    Hyperlipidemia    Hypothyroidism    Status post fall    Urinary retention    History of fracture of right hip    Lumbar compression fracture (HCC)    Hyponatremia    Cognitive decline    Drug-induced constipation    Preoperative clearance    BPH (benign prostatic hyperplasia)    Peripheral vascular disease (HCC)    Paroxysmal atrial fibrillation (HCC)     Past Medical History:   Diagnosis Date    BPH (benign prostatic hyperplasia)     GERD (gastroesophageal reflux disease)     Hyperlipidemia     Hypertension     Hypothyroidism     Kidney stone     Peripheral vascular disease (HCC)     Walker as ambulation aid      Social History     Socioeconomic History    Marital status: /Civil Union     Spouse name: Not on file    Number of children: Not on file    Years of education: Not on file    Highest education level: Not on file   Occupational History    Not on file   Tobacco Use    Smoking status: Former     Types: Cigars, Pipe     Quit date: 1970     Years since quittin.1    Smokeless tobacco: Never   Vaping Use    Vaping status: Never Used   Substance and Sexual Activity    Alcohol use: Not Currently    Drug use: Never    Sexual activity: Not Currently   Other Topics Concern    Not on file   Social History Narrative    Not on file     Social Determinants of Health     Financial Resource Strain: Not on file   Food Insecurity: No Food Insecurity (10/19/2022)    Hunger Vital Sign     Worried About Running Out of Food in the Last Year: Never true     Ran Out of Food in the Last Year: Never true   Transportation Needs: No Transportation Needs (10/19/2022)    PRAPARE - Transportation     Lack of Transportation (Medical): No     Lack of Transportation (Non-Medical): No   Physical Activity: Not on  file   Stress: Not on file   Social Connections: Not on file   Intimate Partner Violence: Not on file   Housing Stability: Low Risk  (10/19/2022)    Housing Stability Vital Sign     Unable to Pay for Housing in the Last Year: No     Number of Places Lived in the Last Year: 1     Unstable Housing in the Last Year: No      Family History   Problem Relation Age of Onset    No Known Problems Father     No Known Problems Mother      Past Surgical History:   Procedure Laterality Date    COLONOSCOPY      FL RETROGRADE PYELOGRAM  10/18/2022    JOINT REPLACEMENT Right 10/2022    hip    [place] CYSTO BLADDER W/URETERAL CATHETERIZATION Left 10/18/2022    Procedure: CYSTOSCOPY RETROGRADE PYELOGRAM WITH INTERPRETATION AND WITH INSERTION STENT URETERAL;  Surgeon: Bronson Howe MD;  [facility];  Service: Urology    SC CYSTO W/INSERT URETERAL STENT Left 11/15/2022    Procedure: EXCHANGE STENT URETERAL;  Surgeon: Masoud Morrow MD;  Location:  MAIN OR;  Service: Urology    [place] CYSTO/URETERO W/LITHOTRIPSY &INDWELL STENT INSRT Left 11/15/2022    Procedure: CYSTOSCOPY URETEROSCOPY WITH LITHOTRIPSY HOLMIUM LASER, RETROGRADE PYELOGRAM AND INSERTION STENT URETERAL;  Surgeon: Masoud Morrow MD;  Location:  MAIN OR;  Service: Urology    SUPRAPUBIC TUBE PLACEMENT N/A 2/23/2023    Procedure: INSERTION SUPRAPUBIC CATHETER PERCUTANEOUS;  Surgeon: Gautam Ashton MD;  [facility];  Service: Urology    TRANSURETHRAL RESECTION OF PROSTATE N/A 2/23/2023    Procedure: (TURP), SPT insertion;  Surgeon: Gautam Ashton MD;  [facility];  Service: Urology       Current Outpatient Medications:     amoxicillin (AMOXIL) 500 mg capsule, Take 2,000 mg by mouth as needed 1 hour prior to dental appointment, Disp: , Rfl:     apixaban (Eliquis) 5 mg, Take 1 tablet (5 mg total) by mouth 2 (two) times a day, Disp: 60 tablet, Rfl: 0    diltiazem (CARDIZEM CD) 120 mg 24 hr capsule, Take 1 capsule (120 mg total) by mouth daily, Disp:  90 capsule, Rfl: 0    docusate sodium (COLACE) 100 mg capsule, Take 1 capsule (100 mg total) by mouth 2 (two) times a day, Disp: 90 capsule, Rfl: 0    levothyroxine 125 mcg tablet, Take 125 mcg by mouth every morning, Disp: , Rfl:     losartan (COZAAR) 50 mg tablet, Take 50 mg by mouth daily, Disp: , Rfl:     omeprazole (PriLOSEC) 20 mg delayed release capsule, Take 20 mg by mouth daily in the early morning, Disp: , Rfl:     simvastatin (ZOCOR) 40 mg tablet, Take 1 tablet by mouth daily at bedtime, Disp: , Rfl:     torsemide (DEMADEX) 10 mg tablet, Take when needed for edema in feet or legs., Disp: 30 tablet, Rfl: 3    polyethylene glycol (MIRALAX) 17 g packet, Take 17 g by mouth daily (Patient not taking: Reported on 2/21/2024), Disp: 30 each, Rfl: 0  No Known Allergies      Labs:  Lab Results   Component Value Date    ALT 15 02/22/2023    AST 19 02/22/2023    BUN 12 02/24/2023    CALCIUM 8.6 02/24/2023     02/24/2023    CO2 25 02/24/2023    CREATININE 1.03 02/24/2023    HCT 36.0 (L) 02/25/2023    HGB 12.3 02/25/2023    MG 2.1 10/08/2022     02/25/2023    K 4.1 02/24/2023    WBC 6.79 02/25/2023       Imaging: No results found.    ECG: 3/7/2023: Sinus rhythm, LAD    Review of Systems:  Review of Systems   Constitutional:  Negative for chills, diaphoresis, fatigue and fever.   HENT:  Negative for congestion.    Eyes:  Negative for photophobia and visual disturbance.   Respiratory:  Negative for chest tightness and shortness of breath.    Cardiovascular:  Positive for leg swelling. Negative for chest pain and palpitations.   Gastrointestinal:  Negative for abdominal distention, abdominal pain, diarrhea, nausea and vomiting.   Genitourinary:  Negative for difficulty urinating and dysuria.   Musculoskeletal:  Negative for arthralgias, gait problem and joint swelling.   Skin:  Negative for color change, pallor and rash.   Neurological:  Negative for dizziness, syncope, numbness and headaches.    Psychiatric/Behavioral:  Negative for agitation, behavioral problems and confusion.          Vitals:    02/21/24 0848   BP: 118/60   Pulse: 80        Vitals:    02/21/24 0848   Weight: 76.7 kg (169 lb)       Height: 6' (182.9 cm)     Physical Exam:  General appearance:  Appears stated age, alert, well appearing and in no distress  HEENT:  PERRLA, EOMI, no scleral icterus, no conjunctival pallor  NECK:  Supple, No elevated JVP, no thyromegaly, no carotid bruits  HEART:  Regular rate and rhythm, normal S1/S2, 3/6 systolic murmur  LUNGS:  Clear to auscultation bilaterally  ABDOMEN:  Soft, non-tender, positive bowel sounds, no rebound or guarding, no organomegaly   EXTREMITIES: Trace bilateral lower extremity edema  VASCULAR:  Normal pedal pulses   SKIN: No lesions or rashes on exposed skin  NEURO:  CN II-XII intact, no focal deficits

## 2024-02-21 ENCOUNTER — OFFICE VISIT (OUTPATIENT)
Dept: CARDIOLOGY CLINIC | Facility: CLINIC | Age: 89
End: 2024-02-21
Payer: COMMERCIAL

## 2024-02-21 VITALS
BODY MASS INDEX: 22.89 KG/M2 | HEIGHT: 72 IN | WEIGHT: 169 LBS | DIASTOLIC BLOOD PRESSURE: 60 MMHG | HEART RATE: 80 BPM | SYSTOLIC BLOOD PRESSURE: 118 MMHG

## 2024-02-21 DIAGNOSIS — I48.0 PAROXYSMAL ATRIAL FIBRILLATION (HCC): Primary | ICD-10-CM

## 2024-02-21 DIAGNOSIS — E78.2 MIXED HYPERLIPIDEMIA: ICD-10-CM

## 2024-02-21 DIAGNOSIS — I50.32 CHRONIC DIASTOLIC HEART FAILURE (HCC): ICD-10-CM

## 2024-02-21 DIAGNOSIS — I10 ESSENTIAL (PRIMARY) HYPERTENSION: ICD-10-CM

## 2024-02-21 DIAGNOSIS — I73.9 PERIPHERAL VASCULAR DISEASE (HCC): ICD-10-CM

## 2024-02-21 PROCEDURE — 99214 OFFICE O/P EST MOD 30 MIN: CPT | Performed by: STUDENT IN AN ORGANIZED HEALTH CARE EDUCATION/TRAINING PROGRAM

## 2024-05-20 ENCOUNTER — TELEPHONE (OUTPATIENT)
Age: 89
End: 2024-05-20

## 2024-05-20 DIAGNOSIS — R31.9 HEMATURIA, UNSPECIFIED TYPE: Primary | ICD-10-CM

## 2024-05-20 DIAGNOSIS — N39.0 URINARY TRACT INFECTION WITHOUT HEMATURIA, SITE UNSPECIFIED: Primary | ICD-10-CM

## 2024-05-20 NOTE — TELEPHONE ENCOUNTER
Patient of Dr. Ashton at [facility]    Patient's daughter Brigid called stating patient is having blood in urine for the last 2 weeks. He is having frequency as well. She wants to know how to proceed since patient next appointment is in October. She wants to know how to proceed if he needs a urine test.    Patient can be reached at [phone]

## 2024-05-20 NOTE — TELEPHONE ENCOUNTER
Agree with urine testing and increasing fluids.  Without seeing patient's urine, difficult to assess whether there is gross hematuria or simply that his urine is dark and concentrated.  Pending results of urine testing we can consider checking imaging.

## 2024-05-20 NOTE — TELEPHONE ENCOUNTER
Spoke to Brigid (daughter) per communication sheet and she states he has been having ongoing blood in urine for the past week. Urine seems like a burnt orange color. No passing of any clots, no visible blood just dark urine is noted. No abdominal or penile pain. Denies any nausea, vomiting, fevers or chills. Care advice given to hydrate well with water and avoid bladder irritants. Urine orders placed and ER precautions reviewed. Please advise of any further recommendations.

## 2024-05-20 NOTE — TELEPHONE ENCOUNTER
Patient's daughter Brigid returned missed call to discus the patient's urinary symptoms.     Pt is c/o of blood in urine for the last 2 weeks.    Call back Brigid at [phone]

## 2024-05-21 ENCOUNTER — LAB (OUTPATIENT)
Dept: LAB | Facility: CLINIC | Age: 89
End: 2024-05-21
Payer: COMMERCIAL

## 2024-05-21 DIAGNOSIS — R31.9 HEMATURIA, UNSPECIFIED TYPE: ICD-10-CM

## 2024-05-21 LAB
BACTERIA UR QL AUTO: ABNORMAL /HPF
BILIRUB UR QL STRIP: NEGATIVE
BUDDING YEAST: PRESENT
CLARITY UR: ABNORMAL
COLOR UR: YELLOW
GLUCOSE UR STRIP-MCNC: NEGATIVE MG/DL
HGB UR QL STRIP.AUTO: ABNORMAL
KETONES UR STRIP-MCNC: NEGATIVE MG/DL
LEUKOCYTE ESTERASE UR QL STRIP: ABNORMAL
NITRITE UR QL STRIP: POSITIVE
NON-SQ EPI CELLS URNS QL MICRO: ABNORMAL /HPF
PH UR STRIP.AUTO: 8 [PH]
PROT UR STRIP-MCNC: ABNORMAL MG/DL
RBC #/AREA URNS AUTO: ABNORMAL /HPF
SP GR UR STRIP.AUTO: 1.02 (ref 1–1.03)
UROBILINOGEN UR STRIP-ACNC: <2 MG/DL
WBC #/AREA URNS AUTO: ABNORMAL /HPF
WBC CLUMPS # UR AUTO: PRESENT /UL

## 2024-05-21 PROCEDURE — 87186 SC STD MICRODIL/AGAR DIL: CPT

## 2024-05-21 PROCEDURE — 87086 URINE CULTURE/COLONY COUNT: CPT

## 2024-05-21 PROCEDURE — 81001 URINALYSIS AUTO W/SCOPE: CPT

## 2024-05-21 PROCEDURE — 87077 CULTURE AEROBIC IDENTIFY: CPT

## 2024-05-22 RX ORDER — LEVOFLOXACIN 500 MG/1
500 TABLET, FILM COATED ORAL EVERY 24 HOURS
Qty: 7 TABLET | Refills: 0 | Status: SHIPPED | OUTPATIENT
Start: 2024-05-22 | End: 2024-05-23 | Stop reason: ALTCHOICE

## 2024-05-22 NOTE — TELEPHONE ENCOUNTER
Spoke with pt. Pt was notified. *Continue to monitor for final sensitivities.     ERASTO Ozuna Nurse Practitioner Signed 2:11 PM     Copy     Antibiotic sent to pharmacy on file.  Will adjust antibiotic if needed based off of sensitivity and susceptibility.

## 2024-05-22 NOTE — TELEPHONE ENCOUNTER
Antibiotic sent to pharmacy on file.  Will adjust antibiotic if needed based off of sensitivity and susceptibility.

## 2024-05-23 ENCOUNTER — TELEPHONE (OUTPATIENT)
Dept: UROLOGY | Facility: MEDICAL CENTER | Age: 89
End: 2024-05-23

## 2024-05-23 DIAGNOSIS — N39.0 URINARY TRACT INFECTION WITHOUT HEMATURIA, SITE UNSPECIFIED: Primary | ICD-10-CM

## 2024-05-23 LAB — BACTERIA UR CULT: ABNORMAL

## 2024-05-23 RX ORDER — AMOXICILLIN AND CLAVULANATE POTASSIUM 875; 125 MG/1; MG/1
1 TABLET, FILM COATED ORAL EVERY 12 HOURS SCHEDULED
Qty: 14 TABLET | Refills: 0 | Status: SHIPPED | OUTPATIENT
Start: 2024-05-23 | End: 2024-05-30

## 2024-05-23 NOTE — TELEPHONE ENCOUNTER
----- Message from ERASTO Curry sent at 5/23/2024  8:56 AM EDT -----  Please let patient know that his urine culture is positive for Proteus, this shows resistance to Levaquin which was prescribed yesterday.  I have sent Augmentin to his pharmacy instead.

## 2024-05-23 NOTE — RESULT ENCOUNTER NOTE
Please let patient know that his urine culture is positive for Proteus, this shows resistance to Levaquin which was prescribed yesterday.  I have sent Augmentin to his pharmacy instead.

## 2024-05-23 NOTE — TELEPHONE ENCOUNTER
Call placed to Pt and he was advise as per RYAN That previous prescription will not work for his current bacteria, so he should  new script at his pharmacy.

## 2024-05-24 NOTE — TELEPHONE ENCOUNTER
Returned call to pt's daughter and advised pt was prescribed antibiotic for UTI.  He required a change of antibiotic with the final UC results.

## 2024-05-24 NOTE — TELEPHONE ENCOUNTER
Pt's daughter Dali called in and stated the pt was a little confused and unsure of his diagnosis or why he needed to switch to a different medication. She is asking for a call back to discuss.     Call back: [phone]

## 2024-07-17 ENCOUNTER — TELEPHONE (OUTPATIENT)
Age: 89
End: 2024-07-17

## 2024-07-17 NOTE — TELEPHONE ENCOUNTER
Call placed to patient's daughter and spoke with her. Informed her that patient needs to be scheduled with MD who does PCNL treatments,.   Pt has been scheduled with Dr. Uribe on 9-5-2024 to discuss surgery

## 2024-07-17 NOTE — TELEPHONE ENCOUNTER
Per daughter patient was admitted at [facility] and a CT showed significant for staghorn calculus. He was referred to come see us. I gave him the 1st available on 9/9, but could someone please triage the case to see if he could wait that long to be seen? I also asked daughter to call radiology department at [facility] and have disc sent to us with the images of the CT.    CB: [phone]

## 2024-08-26 ENCOUNTER — TELEPHONE (OUTPATIENT)
Age: 89
End: 2024-08-26

## 2024-08-26 NOTE — TELEPHONE ENCOUNTER
[facility] phoned in thsi morning needing pt's pre op clearance faxed, procedure 8/27/24,  Please forward clearance documents to [phone] fax

## 2025-03-25 NOTE — PROGRESS NOTES
Cardiology Consultation     Masoud Steward  [id number]  5/9/1934  [facility] CARDIOLOGY [place]  [address] [address]      1. Paroxysmal atrial fibrillation (HCC)  POCT ECG      2. Chronic diastolic heart failure (HCC)  POCT ECG      3. Essential (primary) hypertension        4. Mixed hyperlipidemia        5. Peripheral vascular disease (HCC)                Discussion/Summary:  Paroxysmal atrial fibrillation  - Anticoagulated Eliquis 2.5 mg twice daily  -Not on any AV lindsey blocking agents currently  - Heart rates appear well-controlled today  Chronic heart failure with preserved ejection fraction  -TTE 7/6/2024 at [facility] showed EF 61 to 65%, grade 1 DD, mildly dilated RV with normal function, mild AS  - TTE 2/22/2023 showed EF 60%, grade 1 DD, moderately dilated RV with normal function, mild RA, mild MR, mild to moderate TR, estimated PA pressure 39 mmHg  -Previously on torsemide 10 mg daily-> discontinued after hospitalization in August 2024  -Continues to have some trace bilateral lower extremity edema, overall appears euvolemic  Hypertension  -On midodrine 5 mg TID  - previously on diltiazem 120 mg daily and losartan 50 mg daily-> discontinued after hospitalization August 2024  -Blood pressure remains well-controlled today  Hyperlipidemia  - Continue with simvastatin 40 mg daily  - No recent lipid profile  Peripheral vascular disease  - Carotid ultrasound 7/25/2022 showed 50 to 69% stenosis of the right ICA, less than 50% stenosis of the left ICA  GERD  Hypothyroidism  BPH    Follow-up in 6 months.    History of Present Illness:  Masoud Steward is a 89 y.o. year old male with a past medical history of paroxysmal atrial fibrillation, hypertension, hyperlipidemia, PVD, GERD, BPH and hypothyroidism.    2/21/2024: Patient had [facility] screening on 2/13/2024.  Results showed mild bilateral carotid stenosis, no atrial fibrillation, normal STEPHANIE, borderline CRP at 1.6, and no AAA.  He reports  feeling well today and has no complaints.  Denies any episodes of chest pain, palpitations, lower extremity edema, shortness of breath, or other concerning cardiac symptoms at rest or with exertion.  Denies any major bleeding or blood in his stool.    Interval history:  Patient was hospitalized in July and August in 2024 at [facility] with UTI.  He reports feeling okay today.  Most of his antihypertensives were stopped in August 2024 when he is hospitalized with a UTI and hypotension.  He is now on midodrine as well now.  He has a little bit of lower extremity edema which she reports is relatively stable.  Denies any exertional chest pain, palpitations, shortness of breath, lightheadedness/dizziness, or other concerning cardiac symptoms at this time.    Patient Active Problem List   Diagnosis    Polyneuropathy, peripheral sensorimotor axonal    Abnormal urinalysis    Disorientation    Hydronephrosis with urinary obstruction due to renal calculus    Essential (primary) hypertension    Gastro-esophageal reflux disease without esophagitis    Hyperlipidemia    Hypothyroidism    Status post fall    Urinary retention    History of fracture of right hip    Lumbar compression fracture (HCC)    Hyponatremia    Cognitive decline    Drug-induced constipation    Preoperative clearance    BPH (benign prostatic hyperplasia)    Peripheral vascular disease (HCC)    Paroxysmal atrial fibrillation (HCC)     Past Medical History:   Diagnosis Date    BPH (benign prostatic hyperplasia)     GERD (gastroesophageal reflux disease)     Hyperlipidemia     Hypertension     Hypothyroidism     Kidney stone     Peripheral vascular disease (HCC)     Walker as ambulation aid      Social History     Socioeconomic History    Marital status: /Civil Union     Spouse name: Not on file    Number of children: Not on file    Years of education: Not on file    Highest education level: Not on file   Occupational History    Not on file   Tobacco Use     Smoking status: Former     Types: Cigars, Pipe     Quit date: 1970     Years since quittin.2    Smokeless tobacco: Never   Vaping Use    Vaping status: Never Used   Substance and Sexual Activity    Alcohol use: Not Currently    Drug use: Never    Sexual activity: Not Currently   Other Topics Concern    Not on file   Social History Narrative    Not on file     Social Drivers of Health     Financial Resource Strain: Low Risk  (2024)    Received from [facility]    Overall Financial Resource Strain (CARDIA)     Difficulty of Paying Living Expenses: Not hard at all   Food Insecurity: No Food Insecurity (2024)    Received from [facility]    Hunger Vital Sign     Worried About Running Out of Food in the Last Year: Never true     Ran Out of Food in the Last Year: Never true   Transportation Needs: No Transportation Needs (2024)    Received from [facility]    PRAPARE - Transportation     Lack of Transportation (Medical): No     Lack of Transportation (Non-Medical): No   Physical Activity: Not on file   Stress: Not on file   Social Connections: Not on file   Intimate Partner Violence: Not At Risk (2024)    [facility] from [facility], [facility]    Humiliation, Afraid, Rape, and Kick questionnaire     Fear of Current or Ex-Partner: No     Emotionally Abused: No     Physically Abused: No     Sexually Abused: No   Housing Stability: Low Risk  (10/19/2022)    Housing Stability Vital Sign     Unable to Pay for Housing in the Last Year: No     Number of Places Lived in the Last Year: 1     Unstable Housing in the Last Year: No      Family History   Problem Relation Age of Onset    No Known Problems Father     No Known Problems Mother      Past Surgical History:   Procedure Laterality Date    COLONOSCOPY      FL RETROGRADE PYELOGRAM  10/18/2022    JOINT REPLACEMENT Right 10/2022    hip    IA CYSTO BLADDER W/URETERAL  CATHETERIZATION Left 10/18/2022    Procedure: CYSTOSCOPY RETROGRADE PYELOGRAM WITH INTERPRETATION AND WITH INSERTION STENT URETERAL;  Surgeon: Bronson Howe MD;  Location: [facility];  Service: Urology    DE CYSTO W/INSERT URETERAL STENT Left 11/15/2022    Procedure: EXCHANGE STENT URETERAL;  Surgeon: Masoud Morrow MD;  Location:  MAIN OR;  Service: Urology    DE CYSTO/URETERO W/LITHOTRIPSY &INDWELL STENT INSRT Left 11/15/2022    Procedure: CYSTOSCOPY URETEROSCOPY WITH LITHOTRIPSY HOLMIUM LASER, RETROGRADE PYELOGRAM AND INSERTION STENT URETERAL;  Surgeon: Masoud Morrow MD;  Location: [facility];  Service: Urology    SUPRAPUBIC TUBE PLACEMENT N/A 2/23/2023    Procedure: INSERTION SUPRAPUBIC CATHETER PERCUTANEOUS;  Surgeon: Gautam Ashton MD;  Location: [facility];  Service: Urology    TRANSURETHRAL RESECTION OF PROSTATE N/A 2/23/2023    Procedure: (TURP), SPT insertion;  Surgeon: Gautam Ashton MD;  Location: [facility];  Service: Urology       Current Outpatient Medications:     amoxicillin (AMOXIL) 500 mg capsule, Take 2,000 mg by mouth as needed 1 hour prior to dental appointment, Disp: , Rfl:     docusate sodium (COLACE) 100 mg capsule, Take 1 capsule (100 mg total) by mouth 2 (two) times a day, Disp: 90 capsule, Rfl: 0    Eliquis 2.5 MG, , Disp: , Rfl:     levothyroxine 125 mcg tablet, Take 125 mcg by mouth every morning, Disp: , Rfl:     midodrine (PROAMATINE) 5 mg tablet, Take 5 mg by mouth 3 (three) times a day, Disp: , Rfl:     simvastatin (ZOCOR) 40 mg tablet, Take 1 tablet by mouth daily at bedtime, Disp: , Rfl:     tamsulosin (FLOMAX) 0.4 mg, TAKE 1 CAPSULE BY MOUTH ONCE DAILY, 1/2 HOUR FOLLOWING THE SAME MEAL EACH DAY., Disp: , Rfl:     omeprazole (PriLOSEC) 20 mg delayed release capsule, Take 20 mg by mouth daily in the early morning (Patient not taking: Reported on 3/26/2025), Disp: , Rfl:     polyethylene glycol (MIRALAX) 17 g packet, Take 17 g by mouth daily (Patient not taking: Reported  on 2/21/2024), Disp: 30 each, Rfl: 0  No Known Allergies      Labs:  Lab Results   Component Value Date    ALT 10 07/18/2024    AST 20 07/18/2024    BUN 17 08/19/2024    CALCIUM 9 08/19/2024     08/19/2024    CO2 29 08/19/2024    CREATININE 1.21 08/19/2024    HCT 36.0 (L) 02/25/2023    HGB 12.3 02/25/2023    MG 2 07/07/2024    PHOS 2.4 07/07/2024     02/25/2023    K 4.6 08/19/2024    WBC 6.79 02/25/2023       Imaging: No results found.    ECG: 3/26/2025: Normal sinus rhythm, LAD, poor R wave progression    Review of Systems:  Review of Systems   Constitutional:  Negative for chills, diaphoresis, fatigue and fever.   HENT:  Negative for congestion.    Eyes:  Negative for photophobia and visual disturbance.   Respiratory:  Negative for chest tightness and shortness of breath.    Cardiovascular:  Positive for leg swelling. Negative for chest pain and palpitations.   Gastrointestinal:  Negative for abdominal distention, abdominal pain, diarrhea, nausea and vomiting.   Genitourinary:  Negative for difficulty urinating and dysuria.   Musculoskeletal:  Positive for arthralgias and gait problem. Negative for joint swelling.   Skin:  Negative for color change, pallor and rash.   Neurological:  Negative for dizziness, syncope, numbness and headaches.   Psychiatric/Behavioral:  Negative for agitation, behavioral problems and confusion.          Vitals:    03/26/25 1621   BP: 130/80   Pulse: 65          Vitals:    03/26/25 1621   Weight: 82.6 kg (182 lb 3.2 oz)               Physical Exam:  General appearance:  Appears stated age, alert, well appearing and in no distress  HEENT:  PERRLA, EOMI, no scleral icterus, no conjunctival pallor  NECK:  Supple, No elevated JVP, no thyromegaly, no carotid bruits  HEART:  Regular rate and rhythm, normal S1/S2, 3/6 systolic murmur  LUNGS:  Clear to auscultation bilaterally  ABDOMEN:  Soft, non-tender, positive bowel sounds, no rebound or guarding, no organomegaly   EXTREMITIES:  1+ bilateral lower extremity edema  VASCULAR:  Normal pedal pulses   SKIN: No lesions or rashes on exposed skin  NEURO:  CN II-XII intact, no focal deficits

## 2025-03-26 ENCOUNTER — OFFICE VISIT (OUTPATIENT)
Dept: CARDIOLOGY CLINIC | Facility: CLINIC | Age: OVER 89
End: 2025-03-26
Payer: COMMERCIAL

## 2025-03-26 VITALS
SYSTOLIC BLOOD PRESSURE: 130 MMHG | HEART RATE: 65 BPM | DIASTOLIC BLOOD PRESSURE: 80 MMHG | WEIGHT: 182.2 LBS | BODY MASS INDEX: 24.71 KG/M2

## 2025-03-26 DIAGNOSIS — I50.32 CHRONIC DIASTOLIC HEART FAILURE (HCC): ICD-10-CM

## 2025-03-26 DIAGNOSIS — I73.9 PERIPHERAL VASCULAR DISEASE (HCC): ICD-10-CM

## 2025-03-26 DIAGNOSIS — I48.0 PAROXYSMAL ATRIAL FIBRILLATION (HCC): Primary | ICD-10-CM

## 2025-03-26 DIAGNOSIS — I10 ESSENTIAL (PRIMARY) HYPERTENSION: ICD-10-CM

## 2025-03-26 DIAGNOSIS — E78.2 MIXED HYPERLIPIDEMIA: ICD-10-CM

## 2025-03-26 PROCEDURE — 93000 ELECTROCARDIOGRAM COMPLETE: CPT | Performed by: STUDENT IN AN ORGANIZED HEALTH CARE EDUCATION/TRAINING PROGRAM

## 2025-03-26 PROCEDURE — G2211 COMPLEX E/M VISIT ADD ON: HCPCS | Performed by: STUDENT IN AN ORGANIZED HEALTH CARE EDUCATION/TRAINING PROGRAM

## 2025-03-26 PROCEDURE — 99214 OFFICE O/P EST MOD 30 MIN: CPT | Performed by: STUDENT IN AN ORGANIZED HEALTH CARE EDUCATION/TRAINING PROGRAM

## 2025-03-26 RX ORDER — TAMSULOSIN HYDROCHLORIDE 0.4 MG/1
CAPSULE ORAL
COMMUNITY
Start: 2025-03-15

## 2025-03-26 RX ORDER — APIXABAN 2.5 MG/1
TABLET, FILM COATED ORAL
COMMUNITY
Start: 2025-02-07

## 2025-03-26 RX ORDER — MIDODRINE HYDROCHLORIDE 5 MG/1
5 TABLET ORAL 3 TIMES DAILY
COMMUNITY
Start: 2025-03-15

## 2025-05-30 ENCOUNTER — APPOINTMENT (EMERGENCY)
Dept: CT IMAGING | Facility: HOSPITAL | Age: OVER 89
End: 2025-05-30
Payer: COMMERCIAL

## 2025-05-30 ENCOUNTER — HOSPITAL ENCOUNTER (INPATIENT)
Facility: HOSPITAL | Age: OVER 89
LOS: 3 days | Discharge: HOME HEALTH | End: 2025-06-03
Attending: EMERGENCY MEDICINE | Admitting: STUDENT IN AN ORGANIZED HEALTH CARE EDUCATION/TRAINING PROGRAM
Payer: COMMERCIAL

## 2025-05-30 DIAGNOSIS — K59.09 CHRONIC CONSTIPATION: ICD-10-CM

## 2025-05-30 DIAGNOSIS — R29.898 TRANSIENT WEAKNESS OF LEFT LOWER EXTREMITY: ICD-10-CM

## 2025-05-30 DIAGNOSIS — K29.61 GASTROINTESTINAL HEMORRHAGE ASSOCIATED WITH OTHER GASTRITIS: ICD-10-CM

## 2025-05-30 DIAGNOSIS — I65.21 INTERNAL CAROTID ARTERY STENOSIS, RIGHT: ICD-10-CM

## 2025-05-30 DIAGNOSIS — I95.1 ORTHOSTATIC HYPOTENSION: ICD-10-CM

## 2025-05-30 DIAGNOSIS — R53.1 WEAKNESS: Primary | ICD-10-CM

## 2025-05-30 DIAGNOSIS — K59.03 DRUG-INDUCED CONSTIPATION: ICD-10-CM

## 2025-05-30 DIAGNOSIS — Z87.19 HISTORY OF UPPER GASTROINTESTINAL BLEEDING: ICD-10-CM

## 2025-05-30 PROBLEM — K92.2 GI BLEED: Status: ACTIVE | Noted: 2025-05-23

## 2025-05-30 LAB
2HR DELTA HS TROPONIN: -1 NG/L
ALBUMIN SERPL BCG-MCNC: 3.5 G/DL (ref 3.5–5)
ALP SERPL-CCNC: 42 U/L (ref 34–104)
ALT SERPL W P-5'-P-CCNC: 35 U/L (ref 7–52)
ANION GAP SERPL CALCULATED.3IONS-SCNC: 6 MMOL/L (ref 4–13)
APTT PPP: 32 SECONDS (ref 23–34)
AST SERPL W P-5'-P-CCNC: 30 U/L (ref 13–39)
BASOPHILS # BLD AUTO: 0.03 THOUSANDS/ÂΜL (ref 0–0.1)
BASOPHILS NFR BLD AUTO: 1 % (ref 0–1)
BILIRUB SERPL-MCNC: 0.44 MG/DL (ref 0.2–1)
BUN SERPL-MCNC: 20 MG/DL (ref 5–25)
CALCIUM SERPL-MCNC: 9 MG/DL (ref 8.4–10.2)
CARDIAC TROPONIN I PNL SERPL HS: 19 NG/L (ref ?–50)
CARDIAC TROPONIN I PNL SERPL HS: 20 NG/L (ref ?–50)
CHLORIDE SERPL-SCNC: 105 MMOL/L (ref 96–108)
CO2 SERPL-SCNC: 29 MMOL/L (ref 21–32)
CREAT SERPL-MCNC: 1.11 MG/DL (ref 0.6–1.3)
EOSINOPHIL # BLD AUTO: 0.15 THOUSAND/ÂΜL (ref 0–0.61)
EOSINOPHIL NFR BLD AUTO: 3 % (ref 0–6)
ERYTHROCYTE [DISTWIDTH] IN BLOOD BY AUTOMATED COUNT: 14 % (ref 11.6–15.1)
EST. AVERAGE GLUCOSE BLD GHB EST-MCNC: 114 MG/DL
GFR SERPL CREATININE-BSD FRML MDRD: 57 ML/MIN/1.73SQ M
GLUCOSE SERPL-MCNC: 108 MG/DL (ref 65–140)
GLUCOSE SERPL-MCNC: 93 MG/DL (ref 65–140)
HBA1C MFR BLD: 5.6 %
HCT VFR BLD AUTO: 34.1 % (ref 36.5–49.3)
HCT VFR BLD AUTO: 36.5 % (ref 36.5–49.3)
HGB BLD-MCNC: 11.2 G/DL (ref 12–17)
HGB BLD-MCNC: 11.8 G/DL (ref 12–17)
IMM GRANULOCYTES # BLD AUTO: 0.04 THOUSAND/UL (ref 0–0.2)
IMM GRANULOCYTES NFR BLD AUTO: 1 % (ref 0–2)
INR PPP: 1.04 (ref 0.85–1.19)
LYMPHOCYTES # BLD AUTO: 1.6 THOUSANDS/ÂΜL (ref 0.6–4.47)
LYMPHOCYTES NFR BLD AUTO: 29 % (ref 14–44)
MCH RBC QN AUTO: 35.9 PG (ref 26.8–34.3)
MCHC RBC AUTO-ENTMCNC: 32.8 G/DL (ref 31.4–37.4)
MCV RBC AUTO: 109 FL (ref 82–98)
MONOCYTES # BLD AUTO: 0.6 THOUSAND/ÂΜL (ref 0.17–1.22)
MONOCYTES NFR BLD AUTO: 11 % (ref 4–12)
NEUTROPHILS # BLD AUTO: 3.14 THOUSANDS/ÂΜL (ref 1.85–7.62)
NEUTS SEG NFR BLD AUTO: 55 % (ref 43–75)
NRBC BLD AUTO-RTO: 0 /100 WBCS
PLATELET # BLD AUTO: 205 THOUSANDS/UL (ref 149–390)
PMV BLD AUTO: 9.1 FL (ref 8.9–12.7)
POTASSIUM SERPL-SCNC: 4.5 MMOL/L (ref 3.5–5.3)
PROT SERPL-MCNC: 6.1 G/DL (ref 6.4–8.4)
PROTHROMBIN TIME: 14.1 SECONDS (ref 12.3–15)
RBC # BLD AUTO: 3.12 MILLION/UL (ref 3.88–5.62)
SODIUM SERPL-SCNC: 140 MMOL/L (ref 135–147)
WBC # BLD AUTO: 5.56 THOUSAND/UL (ref 4.31–10.16)

## 2025-05-30 PROCEDURE — 36415 COLL VENOUS BLD VENIPUNCTURE: CPT | Performed by: EMERGENCY MEDICINE

## 2025-05-30 PROCEDURE — 70498 CT ANGIOGRAPHY NECK: CPT

## 2025-05-30 PROCEDURE — 85014 HEMATOCRIT: CPT | Performed by: INTERNAL MEDICINE

## 2025-05-30 PROCEDURE — 93005 ELECTROCARDIOGRAM TRACING: CPT

## 2025-05-30 PROCEDURE — 85025 COMPLETE CBC W/AUTO DIFF WBC: CPT | Performed by: EMERGENCY MEDICINE

## 2025-05-30 PROCEDURE — 85730 THROMBOPLASTIN TIME PARTIAL: CPT | Performed by: EMERGENCY MEDICINE

## 2025-05-30 PROCEDURE — 99223 1ST HOSP IP/OBS HIGH 75: CPT | Performed by: INTERNAL MEDICINE

## 2025-05-30 PROCEDURE — 80053 COMPREHEN METABOLIC PANEL: CPT | Performed by: EMERGENCY MEDICINE

## 2025-05-30 PROCEDURE — 99205 OFFICE O/P NEW HI 60 MIN: CPT | Performed by: SURGERY

## 2025-05-30 PROCEDURE — 85610 PROTHROMBIN TIME: CPT | Performed by: EMERGENCY MEDICINE

## 2025-05-30 PROCEDURE — 84484 ASSAY OF TROPONIN QUANT: CPT | Performed by: EMERGENCY MEDICINE

## 2025-05-30 PROCEDURE — 85018 HEMOGLOBIN: CPT | Performed by: INTERNAL MEDICINE

## 2025-05-30 PROCEDURE — 82948 REAGENT STRIP/BLOOD GLUCOSE: CPT

## 2025-05-30 PROCEDURE — 99214 OFFICE O/P EST MOD 30 MIN: CPT | Performed by: INTERNAL MEDICINE

## 2025-05-30 PROCEDURE — 70496 CT ANGIOGRAPHY HEAD: CPT

## 2025-05-30 PROCEDURE — 83036 HEMOGLOBIN GLYCOSYLATED A1C: CPT | Performed by: INTERNAL MEDICINE

## 2025-05-30 PROCEDURE — 99285 EMERGENCY DEPT VISIT HI MDM: CPT

## 2025-05-30 PROCEDURE — 99285 EMERGENCY DEPT VISIT HI MDM: CPT | Performed by: EMERGENCY MEDICINE

## 2025-05-30 RX ORDER — TAMSULOSIN HYDROCHLORIDE 0.4 MG/1
0.4 CAPSULE ORAL
Status: DISCONTINUED | OUTPATIENT
Start: 2025-05-30 | End: 2025-06-03 | Stop reason: HOSPADM

## 2025-05-30 RX ORDER — ONDANSETRON 2 MG/ML
4 INJECTION INTRAMUSCULAR; INTRAVENOUS EVERY 6 HOURS PRN
Status: DISCONTINUED | OUTPATIENT
Start: 2025-05-30 | End: 2025-06-03 | Stop reason: HOSPADM

## 2025-05-30 RX ORDER — CLOPIDOGREL BISULFATE 75 MG/1
75 TABLET ORAL DAILY
Status: DISCONTINUED | OUTPATIENT
Start: 2025-05-30 | End: 2025-06-03 | Stop reason: HOSPADM

## 2025-05-30 RX ORDER — PANTOPRAZOLE SODIUM 40 MG/10ML
40 INJECTION, POWDER, LYOPHILIZED, FOR SOLUTION INTRAVENOUS EVERY 12 HOURS SCHEDULED
Status: DISCONTINUED | OUTPATIENT
Start: 2025-05-30 | End: 2025-06-03 | Stop reason: HOSPADM

## 2025-05-30 RX ORDER — MIDODRINE HYDROCHLORIDE 5 MG/1
10 TABLET ORAL 3 TIMES DAILY
Status: DISCONTINUED | OUTPATIENT
Start: 2025-05-30 | End: 2025-06-03 | Stop reason: HOSPADM

## 2025-05-30 RX ORDER — DOCUSATE SODIUM 100 MG/1
100 CAPSULE, LIQUID FILLED ORAL 2 TIMES DAILY
Status: DISCONTINUED | OUTPATIENT
Start: 2025-05-30 | End: 2025-06-02

## 2025-05-30 RX ORDER — ATORVASTATIN CALCIUM 40 MG/1
40 TABLET, FILM COATED ORAL EVERY EVENING
Status: DISCONTINUED | OUTPATIENT
Start: 2025-05-30 | End: 2025-06-03 | Stop reason: HOSPADM

## 2025-05-30 RX ORDER — ACETAMINOPHEN 325 MG/1
650 TABLET ORAL EVERY 6 HOURS PRN
Status: DISCONTINUED | OUTPATIENT
Start: 2025-05-30 | End: 2025-06-03 | Stop reason: HOSPADM

## 2025-05-30 RX ADMIN — ATORVASTATIN CALCIUM 40 MG: 40 TABLET, FILM COATED ORAL at 17:24

## 2025-05-30 RX ADMIN — CLOPIDOGREL 75 MG: 75 TABLET ORAL at 16:27

## 2025-05-30 RX ADMIN — TAMSULOSIN HYDROCHLORIDE 0.4 MG: 0.4 CAPSULE ORAL at 16:27

## 2025-05-30 RX ADMIN — DOCUSATE SODIUM 100 MG: 100 CAPSULE, LIQUID FILLED ORAL at 17:24

## 2025-05-30 RX ADMIN — MIDODRINE HYDROCHLORIDE 10 MG: 5 TABLET ORAL at 21:31

## 2025-05-30 RX ADMIN — MIDODRINE HYDROCHLORIDE 10 MG: 5 TABLET ORAL at 16:27

## 2025-05-30 RX ADMIN — IOHEXOL 85 ML: 350 INJECTION, SOLUTION INTRAVENOUS at 12:48

## 2025-05-30 RX ADMIN — PANTOPRAZOLE SODIUM 40 MG: 40 INJECTION, POWDER, FOR SOLUTION INTRAVENOUS at 21:31

## 2025-05-30 NOTE — ASSESSMENT & PLAN NOTE
- Previously on Eliquis 2.5 mg BID  - Held since 5/23/2025 2/2 admission at [facility] for GI bleed   - Echo ordered, telemetry ordered

## 2025-05-30 NOTE — ASSESSMENT & PLAN NOTE
Presenting with transient left lower extremity weakness  CT head, CTA head/neck done-severe stenosis in the proximal right ICA  Admit under stroke protocol  Neurology consulted  MRI brain  Hemoglobin A1c  Lipid panel  Started on Plavix  GI consulted for risk benefit ratio with anticoagulation  Consulted vascular surgery  Substitute simvastatin with atorvastatin  Telemetry  PT/OT/speech  Permissive hypertension  Neurochecks

## 2025-05-30 NOTE — PLAN OF CARE
Problem: Potential for Falls  Goal: Patient will remain free of falls  Description: INTERVENTIONS:  - Educate patient/family on patient safety including physical limitations  - Instruct patient to call for assistance with activity   - Consider consulting OT/PT to assist with strengthening/mobility based on AM PAC & JH-HLM score  - Consult OT/PT to assist with strengthening/mobility   - Keep Call bell within reach  - Keep bed low and locked with side rails adjusted as appropriate  - Keep care items and personal belongings within reach  - Initiate and maintain comfort rounds  - Make Fall Risk Sign visible to staff  - Offer Toileting every  Hours, in advance of need  - Initiate/Maintain alarm  - Obtain necessary fall risk management equipment:   - Apply yellow socks and bracelet for high fall risk patients  - Consider moving patient to room near nurses station  5/30/2025 1533 by Sherron Cast RN  Outcome: Progressing  5/30/2025 1533 by Sherron Cast RN  Outcome: Progressing     Problem: SAFETY ADULT  Goal: Patient will remain free of falls  Description: INTERVENTIONS:  - Educate patient/family on patient safety including physical limitations  - Instruct patient to call for assistance with activity   - Consider consulting OT/PT to assist with strengthening/mobility based on AM PAC & JH-HLM score  - Consult OT/PT to assist with strengthening/mobility   - Keep Call bell within reach  - Keep bed low and locked with side rails adjusted as appropriate  - Keep care items and personal belongings within reach  - Initiate and maintain comfort rounds  - Make Fall Risk Sign visible to staff  - Offer Toileting every  Hours, in advance of need  - Initiate/Maintain alarm  - Obtain necessary fall risk management equipment:   - Apply yellow socks and bracelet for high fall risk patients  - Consider moving patient to room near nurses station  5/30/2025 1533 by Sherron Cast RN  Outcome: Progressing  5/30/2025 1533 by  Sherron Cast RN  Outcome: Progressing  Goal: Maintain or return to baseline ADL function  Description: INTERVENTIONS:  -  Assess patient's ability to carry out ADLs; assess patient's baseline for ADL function and identify physical deficits which impact ability to perform ADLs (bathing, care of mouth/teeth, toileting, grooming, dressing, etc.)  - Assess/evaluate cause of self-care deficits   - Assess range of motion  - Assess patient's mobility; develop plan if impaired  - Assess patient's need for assistive devices and provide as appropriate  - Encourage maximum independence but intervene and supervise when necessary  - Involve family in performance of ADLs  - Assess for home care needs following discharge   - Consider OT consult to assist with ADL evaluation and planning for discharge  - Provide patient education as appropriate  - Monitor functional capacity and physical performance, use of AM PAC & JH-HLM   - Monitor gait, balance and fatigue with ambulation    Outcome: Progressing  Goal: Maintains/Returns to pre admission functional level  Description: INTERVENTIONS:  - Perform AM-PAC 6 Click Basic Mobility/ Daily Activity assessment daily.  - Set and communicate daily mobility goal to care team and patient/family/caregiver.   - Collaborate with rehabilitation services on mobility goals if consulted  - Perform Range of Motion  times a day.  - Reposition patient every  hours.  - Dangle patient  times a day  - Stand patient  times a day  - Ambulate patient  times a day  - Out of bed to chair  times a day   - Out of bed for meals times a day  - Out of bed for toileting  - Record patient progress and toleration of activity level   Outcome: Progressing     Problem: DISCHARGE PLANNING  Goal: Discharge to home or other facility with appropriate resources  Description: INTERVENTIONS:  - Identify barriers to discharge w/patient and caregiver  - Arrange for needed discharge resources and transportation as  appropriate  - Identify discharge learning needs (meds, wound care, etc.)  - Arrange for interpretive services to assist at discharge as needed  - Refer to Case Management Department for coordinating discharge planning if the patient needs post-hospital services based on physician/advanced practitioner order or complex needs related to functional status, cognitive ability, or social support system  Outcome: Progressing     Problem: Knowledge Deficit  Goal: Patient/family/caregiver demonstrates understanding of disease process, treatment plan, medications, and discharge instructions  Description: Complete learning assessment and assess knowledge base.  Interventions:  - Provide teaching at level of understanding  - Provide teaching via preferred learning methods  Outcome: Progressing     Problem: MUSCULOSKELETAL - ADULT  Goal: Maintain or return mobility to safest level of function  Description: INTERVENTIONS:  - Assess patient's ability to carry out ADLs; assess patient's baseline for ADL function and identify physical deficits which impact ability to perform ADLs (bathing, care of mouth/teeth, toileting, grooming, dressing, etc.)  - Assess/evaluate cause of self-care deficits   - Assess range of motion  - Assess patient's mobility  - Assess patient's need for assistive devices and provide as appropriate  - Encourage maximum independence but intervene and supervise when necessary  - Involve family in performance of ADLs  - Assess for home care needs following discharge   - Consider OT consult to assist with ADL evaluation and planning for discharge  - Provide patient education as appropriate  Outcome: Progressing

## 2025-05-30 NOTE — ASSESSMENT & PLAN NOTE
"Recent hospitalization at [facility] (5/23- 5/27) for hematemesis  Emergent EGD at that time revealed \"spurting vessel at EGJ s/p Endo Clip x 1 and p.o. diet with successful hemostasis \".  Patient had EGD in July 2024 at [facility]- also noted gastric erosions at that time  Consult GI here if okay to start antiplatelet here  Monitor H and H Q12  PPI BID     "

## 2025-05-30 NOTE — ASSESSMENT & PLAN NOTE
Follows with cardiology  On Eliquis previously, held after recent GI bleed at [facility]  Monitor on telemetry

## 2025-05-30 NOTE — H&P
"H&P - Hospitalist   Name: Masoud Steward 89 y.o. male I MRN: [id number]  Unit/Bed#: [facility] I Date of Admission: 5/30/2025   Date of Service: 5/30/2025 I Hospital Day: 0     Assessment & Plan  Transient weakness of left lower extremity  Presenting with transient left lower extremity weakness  CT head, CTA head/neck done-severe stenosis in the proximal right ICA  Admit under stroke protocol  Neurology consulted  MRI brain  Hemoglobin A1c  Lipid panel  Started on Plavix  GI consulted for risk benefit ratio with anticoagulation  Consulted vascular surgery  Substitute simvastatin with atorvastatin  Telemetry  PT/OT/speech  Permissive hypertension  Neurochecks  Hypothyroidism  Continue levothyroxine  Paroxysmal atrial fibrillation (HCC)  Follows with cardiology  On Eliquis previously, held after recent GI bleed at [facility]  Monitor on telemetry  GI bleed  Recent hospitalization at [facility] (5/23- 5/27) for hematemesis  Emergent EGD at that time revealed \"spurting vessel at EGJ s/p Endo Clip x 1 and p.o. diet with successful hemostasis \".  Patient had EGD in July 2024 at [facility]- also noted gastric erosions at that time  Consult GI here if okay to start antiplatelet here  Monitor H and H Q12  PPI BID     Internal carotid artery stenosis, right  CTA H/N-severe stenosis in the proximal right ICA  Consult vascular surgery  Started on Plavix  Continue atorvastatin  Orthostatic hypotension  Patient stated he was hypotensive previously and was admitted for 4 weeks at [facility]  Currently on midodrine 10 mg TID from recent DC summary from [facility]      VTE Pharmacologic Prophylaxis:   Moderate Risk (Score 3-4) - Pharmacological DVT Prophylaxis Contraindicated. Sequential Compression Devices Ordered.  Code Status: Level 1 - Full Code   Discussion with family: Patient declined call to .  Stated, \"they are at work, I called them already\"    Anticipated Length of Stay: Patient will be admitted on an observation basis with an " "anticipated length of stay of less than 2 midnights secondary to Transient weakness.    History of Present Illness   Chief Complaint:   Chief Complaint   Patient presents with    Weakness - Generalized     Pt to ED via EMS. EMS reports that pt was grocery shopping when he suddenly became very weak. EMS states that pt did not fall. Pt denies any pain at this time.         Masoud Steward is a 89 y.o. male with a PMH of A-fib not on anticoagulation, history of GI bleed, hypertension, hyperlipidemia, peripheral vascular disease, peripheral neuropathy who presents with transient left lower extremity weakness.  Patient stated he usually walks with a rollator and was not using walker today in grocery store. HE was using shopping cart, and suddenly he felt leaning towards his left and felt his left LE was weak as he was trying to grab a pie. He fell into pile of pies. Denies headache, dizziness, \"might have felt light headed, but felt like something was off\".  Denies other focal weakness, speech abnormality, vision abnormality, headache.    In the ED patient was a stroke alert.  CT head, CTA head/neck done.  Patient was not a TNK candidate times resolved.  Neurology consulted.  Patient will be admitted as obs for further management    Review of Systems   All other systems reviewed and are negative.      Historical Information   Past Medical History[1]  Past Surgical History[2]  Social History[3]  E-Cigarette/Vaping    E-Cigarette Use Never User      E-Cigarette/Vaping Substances    Nicotine No     THC No     CBD No     Flavoring No     Other No     Unknown No      Family History[4]  Social History:  Marital Status: /Civil Union   Occupation: retired  Patient Pre-hospital Living Situation: Home  Patient Pre-hospital Level of Mobility: walks with walker  Patient Pre-hospital Diet Restrictions: None    Meds/Allergies   I have reviewed home medications using recent Epic encounter.  Prior to Admission medications  "   Medication Sig Start Date End Date Taking? Authorizing Provider   amoxicillin (AMOXIL) 500 mg capsule Take 2,000 mg by mouth as needed 1 hour prior to dental appointment 7/7/23   Historical Provider, MD   docusate sodium (COLACE) 100 mg capsule Take 1 capsule (100 mg total) by mouth 2 (two) times a day 2/25/23   Maria L Turner PA-C   Eliquis 2.5 MG  2/7/25   Historical Provider, MD   levothyroxine 125 mcg tablet Take 125 mcg by mouth every morning 10/3/22   Historical Provider, MD   midodrine (PROAMATINE) 5 mg tablet Take 5 mg by mouth 3 (three) times a day 3/15/25   Historical Provider, MD   omeprazole (PriLOSEC) 20 mg delayed release capsule Take 20 mg by mouth daily in the early morning  Patient not taking: Reported on 3/26/2025 4/26/22   Historical Provider, MD   polyethylene glycol (MIRALAX) 17 g packet Take 17 g by mouth daily  Patient not taking: Reported on 2/21/2024 2/25/23   Maria L Turner PA-C   simvastatin (ZOCOR) 40 mg tablet Take 1 tablet by mouth daily at bedtime 10/6/22   Historical Provider, MD   tamsulosin (FLOMAX) 0.4 mg TAKE 1 CAPSULE BY MOUTH ONCE DAILY, 1/2 HOUR FOLLOWING THE SAME MEAL EACH DAY. 3/15/25   Historical Provider, MD     No Known Allergies    Objective :  Temp:  [97.6 °F (36.4 °C)-98.1 °F (36.7 °C)] 97.6 °F (36.4 °C)  HR:  [52-67] 55  BP: (122-143)/(59-79) 136/76  Resp:  [15-20] 16  SpO2:  [97 %-100 %] 98 %  O2 Device: None (Room air)    Physical Exam  Vitals and nursing note reviewed.   HENT:      Head: Normocephalic and atraumatic.      Mouth/Throat:      Mouth: Mucous membranes are moist.      Pharynx: Oropharynx is clear.     Eyes:      Conjunctiva/sclera: Conjunctivae normal.       Cardiovascular:      Rate and Rhythm: Normal rate.      Pulses: Normal pulses.   Pulmonary:      Effort: Pulmonary effort is normal.      Breath sounds: Normal breath sounds.   Abdominal:      General: There is no distension.      Palpations: Abdomen is soft.      Tenderness: There is no  "abdominal tenderness.     Musculoskeletal:      Right lower leg: No edema.      Left lower leg: No edema.     Skin:     General: Skin is warm and dry.      Capillary Refill: Capillary refill takes 2 to 3 seconds.     Neurological:      Mental Status: He is alert and oriented to person, place, and time.     Psychiatric:         Mood and Affect: Mood normal.         Behavior: Behavior normal.         Thought Content: Thought content normal.         Judgment: Judgment normal.          Lines/Drains:            Lab Results: I have reviewed the following results:  Results from last 7 days   Lab Units 05/30/25  1234   WBC Thousand/uL 5.56   HEMOGLOBIN g/dL 11.2*   HEMATOCRIT % 34.1*   PLATELETS Thousands/uL 205   SEGS PCT % 55   LYMPHO PCT % 29   MONO PCT % 11   EOS PCT % 3     Results from last 7 days   Lab Units 05/30/25  1234   SODIUM mmol/L 140   POTASSIUM mmol/L 4.5   CHLORIDE mmol/L 105   CO2 mmol/L 29   BUN mg/dL 20   CREATININE mg/dL 1.11   ANION GAP mmol/L 6   CALCIUM mg/dL 9.0   ALBUMIN g/dL 3.5   TOTAL BILIRUBIN mg/dL 0.44   ALK PHOS U/L 42   ALT U/L 35   AST U/L 30   GLUCOSE RANDOM mg/dL 108     Results from last 7 days   Lab Units 05/30/25  1234   INR  1.04     Results from last 7 days   Lab Units 05/30/25  1252   POC GLUCOSE mg/dl 93     No results found for: \"HGBA1C\"        Imaging Results Review: I reviewed radiology reports from this admission including: CT head and CT A Head and neck.  Other Study Results Review: EKG was personally reviewed and my interpretation is: NSR. HR 66..    Administrative Statements   I have spent a total time of 75 minutes in caring for this patient on the day of the visit/encounter including Diagnostic results, Impressions, Counseling / Coordination of care, Documenting in the medical record, Reviewing/placing orders in the medical record (including tests, medications, and/or procedures), Obtaining or reviewing history  , and Communicating with other healthcare professionals " .    ** Please Note: This note has been constructed using a voice recognition system. **         [1]   Past Medical History:  Diagnosis Date    BPH (benign prostatic hyperplasia)     GERD (gastroesophageal reflux disease)     Hyperlipidemia     Hypertension     Hypothyroidism     Kidney stone     Peripheral vascular disease (HCC)     Walker as ambulation aid    [2]   Past Surgical History:  Procedure Laterality Date    COLONOSCOPY      FL RETROGRADE PYELOGRAM  10/18/2022    JOINT REPLACEMENT Right 10/2022    hip    KY CYSTO BLADDER W/URETERAL CATHETERIZATION Left 10/18/2022    Procedure: CYSTOSCOPY RETROGRADE PYELOGRAM WITH INTERPRETATION AND WITH INSERTION STENT URETERAL;  Surgeon: Bronson Howe MD;  Location: [facility];  Service: Urology    KY CYSTO W/INSERT URETERAL STENT Left 11/15/2022    Procedure: EXCHANGE STENT URETERAL;  Surgeon: Masoud Morrow MD;  Location:  MAIN OR;  Service: Urology    KY CYSTO/URETERO W/LITHOTRIPSY &INDWELL STENT INSRT Left 11/15/2022    Procedure: CYSTOSCOPY URETEROSCOPY WITH LITHOTRIPSY HOLMIUM LASER, RETROGRADE PYELOGRAM AND INSERTION STENT URETERAL;  Surgeon: Masoud Morrow MD;  Location:  MAIN OR;  Service: Urology    SUPRAPUBIC TUBE PLACEMENT N/A 2023    Procedure: INSERTION SUPRAPUBIC CATHETER PERCUTANEOUS;  Surgeon: Gautam Ashton MD;  Location: [facility];  Service: Urology    TRANSURETHRAL RESECTION OF PROSTATE N/A 2023    Procedure: (TURP), SPT insertion;  Surgeon: Gautam Ashton MD;  Location: [facility];  Service: Urology   [3]   Social History  Tobacco Use    Smoking status: Former     Types: Cigars, Pipe     Quit date: 1970     Years since quittin.4    Smokeless tobacco: Never   Vaping Use    Vaping status: Never Used   Substance and Sexual Activity    Alcohol use: Not Currently    Drug use: Never    Sexual activity: Not Currently   [4]   Family History  Problem Relation Name Age of Onset    No Known Problems Father      No Known Problems  Mother

## 2025-05-30 NOTE — ED PROVIDER NOTES
Time reflects when diagnosis was documented in both MDM as applicable and the Disposition within this note       Time User Action Codes Description Comment    5/30/2025 12:32 PM Bianca Hernandez [R53.1] Weakness     5/30/2025  1:16 PM Bianca Hernandez Add [I65.21] Internal carotid artery stenosis, right     5/30/2025  1:56 PM Bianca Hernandez Add [Z87.19] History of upper gastrointestinal bleeding           ED Disposition       ED Disposition   Admit    Condition   Stable    Date/Time   Fri May 30, 2025  2:58 PM    Comment   Case was discussed with CLARK and the patient's admission status was agreed to be Admission Status: observation status to the service of Dr. Wilkinson .               Assessment & Plan       Medical Decision Making  89 year old male presents for evaluation after falling in the grocery store after his left leg became numb and weak.  Patient denies lightheadedness or dizziness prior to the fall.  Decreased sensation left lower extremity which may be attributed to increased edema in the leg.  Exam otherwise without focal neurologic deficits.  NIHSS 1.  Patient's Eliquis currently on hold due to recent GI bleed.  Patient is not a tnk candidate due to bleeding risk.  History of Afib.  Currently in sinus rhythm.  No signs of ICH on CTH.  Right ICA stenosis on CTA head/neck.  No LVO.  Discussed with neurology who recommends stroke pathway admission.  GI consulted regarding recent GI bleed to assist in risk/benefit decision of blood thinners given possible TIA.  Patient admitted for further evaluation and management.    Amount and/or Complexity of Data Reviewed  Labs: ordered.  Radiology: ordered.    Risk  Prescription drug management.  Decision regarding hospitalization.        ED Course as of 05/30/25 1458   Fri May 30, 2025   1316 Orthostatics negative       Medications   iohexol (OMNIPAQUE) 350 MG/ML injection (MULTI-DOSE) 100 mL (85 mL Intravenous Given 5/30/25 1248)       ED Risk Strat  Scores         Stroke Assessment       Row Name 05/30/25 1233             NIH Stroke Scale    Interval Baseline      Level of Consciousness (1a.) 0      LOC Questions (1b.) 0      LOC Commands (1c.) 0      Best Gaze (2.) 0      Visual (3.) 0      Facial Palsy (4.) 0      Motor Arm, Left (5a.) 0      Motor Arm, Right (5b.) 0      Motor Leg, Left (6a.) 0      Motor Leg, Right (6b.) 0      Limb Ataxia (7.) 0      Sensory (8.) 1      Best Language (9.) 0      Dysarthria (10.) 0      Extinction and Inattention (11.) (Formerly Neglect) 0      Total 1                    Flowsheet Row Most Recent Value   Thrombolytic Decision Options    Thrombolytic Decision Patient not a candidate.   Patient is not a candidate options Bleeding risk.                    No data recorded                            History of Present Illness       Chief Complaint   Patient presents with    Weakness - Generalized     Pt to ED via EMS. EMS reports that pt was grocery shopping when he suddenly became very weak. EMS states that pt did not fall. Pt denies any pain at this time.        Past Medical History[1]   Past Surgical History[2]   Family History[3]   Social History[4]   E-Cigarette/Vaping    E-Cigarette Use Never User       E-Cigarette/Vaping Substances    Nicotine No     THC No     CBD No     Flavoring No     Other No     Unknown No       I have reviewed and agree with the history as documented.     89 year old male brought by EMS from [employer] grocery store.  Patient states that he was shopping when his left leg became numb and weak causing him to fall into a bunch of pies.  Patient states he has chronic edema and neuropathy of his legs, but that the left leg feels more numb than usual.  No history of prior stroke.  Patient states he was recently discharged from the hospital after an admission for GI bleeding.  He had been taking Eliquis for history of Afib, but this has been held since his hospitalization.  Patient denies dizziness or  lightheadedness.          Review of Systems        Objective       ED Triage Vitals   Temperature Pulse Blood Pressure Respirations SpO2 Patient Position - Orthostatic VS   05/30/25 1231 05/30/25 1225 05/30/25 1225 05/30/25 1230 05/30/25 1225 05/30/25 1258   98.1 °F (36.7 °C) 65 136/65 18 98 % Lying - Orthostatic VS      Temp Source Heart Rate Source BP Location FiO2 (%) Pain Score    05/30/25 1231 05/30/25 1230 05/30/25 1258 -- 05/30/25 1231    Oral Monitor Left arm  No Pain      Vitals      Date and Time Temp Pulse SpO2 Resp BP Pain Score FACES Pain Rating User   05/30/25 1430 -- 57 99 % 19 143/72 -- -- RN   05/30/25 1415 -- 52 97 % 15 127/61 -- -- RN   05/30/25 1400 -- 55 98 % 18 132/63 No Pain -- RN   05/30/25 1345 -- 53 98 % 16 130/64 No Pain -- RN   05/30/25 1330 -- 58 100 % 18 130/62 No Pain -- RN   05/30/25 1315 -- 64 97 % 18 125/59 -- -- RN   05/30/25 1301 -- 67 -- -- 126/60 -- -- RN   05/30/25 1259 -- 63 -- -- 122/66 -- -- RN   05/30/25 1258 -- 62 -- -- 125/69 -- -- RN   05/30/25 1245 -- 60 98 % 15 130/63 -- -- RN   05/30/25 1231 98.1 °F (36.7 °C) -- -- -- -- No Pain -- RN   05/30/25 1230 -- 64 99 % 18 -- -- -- RN   05/30/25 1225 -- 65 98 % -- 136/65 -- -- RN            Physical Exam  Vitals and nursing note reviewed.   HENT:      Head: Normocephalic and atraumatic.     Eyes:      Extraocular Movements: Extraocular movements intact.      Pupils: Pupils are equal, round, and reactive to light.       Cardiovascular:      Rate and Rhythm: Normal rate and regular rhythm.      Pulses: Normal pulses.   Pulmonary:      Effort: Pulmonary effort is normal. No respiratory distress.   Abdominal:      General: There is no distension.      Palpations: Abdomen is soft.      Tenderness: There is no abdominal tenderness.     Musculoskeletal:      Right lower leg: Edema (2+) present.      Left lower leg: Edema (3+) present.     Skin:     General: Skin is warm and dry.     Neurological:      Mental Status: He is alert and  oriented to person, place, and time.      Cranial Nerves: Cranial nerves 2-12 are intact.      Sensory: Sensory deficit (decreased sensation left lower extremity) present.      Motor: Motor function is intact.      Coordination: Finger-Nose-Finger Test and Heel to Shin Test normal.         Results Reviewed       Procedure Component Value Units Date/Time    HS Troponin I 2hr [[id number]] Collected: 05/30/25 1453    Lab Status: In process Specimen: Blood from Arm, Left Updated: 05/30/25 1455    HS Troponin I 4hr [[id number]]     Lab Status: No result Specimen: Blood     HS Troponin 0hr (reflex protocol) [[id number]]  (Normal) Collected: 05/30/25 1234    Lab Status: Final result Specimen: Blood from Arm, Right Updated: 05/30/25 1303     hs TnI 0hr 20 ng/L     Protime-INR [[id number]]  (Normal) Collected: 05/30/25 1234    Lab Status: Final result Specimen: Blood from Arm, Right Updated: 05/30/25 1257     Protime 14.1 seconds      INR 1.04    Narrative:      INR Therapeutic Range    Indication                                             INR Range      Atrial Fibrillation                                               2.0-3.0  Hypercoagulable State                                    2.0.2.3  Left Ventricular Asist Device                            2.0-3.0  Mechanical Heart Valve                                  -    Aortic(with afib, MI, embolism, HF, LA enlargement,    and/or coagulopathy)                                     2.0-3.0 (2.5-3.5)     Mitral                                                             2.5-3.5  Prosthetic/Bioprosthetic Heart Valve               2.0-3.0  Venous thromboembolism (VTE: VT, PE        2.0-3.0    APTT [[id number]]  (Normal) Collected: 05/30/25 1234    Lab Status: Final result Specimen: Blood from Arm, Right Updated: 05/30/25 1257     PTT 32 seconds     Fingerstick Glucose (POCT) [[id number]]  (Normal) Collected: 05/30/25 1252    Lab Status: Final result Specimen: Blood Updated: 05/30/25 1253      POC Glucose 93 mg/dl     Comprehensive metabolic panel [[id number]]  (Abnormal) Collected: 05/30/25 1234    Lab Status: Final result Specimen: Blood from Arm, Right Updated: 05/30/25 1253     Sodium 140 mmol/L      Potassium 4.5 mmol/L      Chloride 105 mmol/L      CO2 29 mmol/L      ANION GAP 6 mmol/L      BUN 20 mg/dL      Creatinine 1.11 mg/dL      Glucose 108 mg/dL      Calcium 9.0 mg/dL      AST 30 U/L      ALT 35 U/L      Alkaline Phosphatase 42 U/L      Total Protein 6.1 g/dL      Albumin 3.5 g/dL      Total Bilirubin 0.44 mg/dL      eGFR 57 ml/min/1.73sq m     Narrative:      National Kid[employer] Disease Foundation guidelines for Chronic Kidney Disease (CKD):     Stage 1 with normal or high GFR (GFR > 90 mL/min/1.73 square meters)    Stage 2 Mild CKD (GFR = 60-89 mL/min/1.73 square meters)    Stage 3A Moderate CKD (GFR = 45-59 mL/min/1.73 square meters)    Stage 3B Moderate CKD (GFR = 30-44 mL/min/1.73 square meters)    Stage 4 Severe CKD (GFR = 15-29 mL/min/1.73 square meters)    Stage 5 End Stage CKD (GFR <15 mL/min/1.73 square meters)  Note: GFR calculation is accurate only with a steady state creatinine    CBC and differential [[id number]]  (Abnormal) Collected: 05/30/25 1234    Lab Status: Final result Specimen: Blood from Arm, Right Updated: 05/30/25 1239     WBC 5.56 Thousand/uL      RBC 3.12 Million/uL      Hemoglobin 11.2 g/dL      Hematocrit 34.1 %       fL      MCH 35.9 pg      MCHC 32.8 g/dL      RDW 14.0 %      MPV 9.1 fL      Platelets 205 Thousands/uL      nRBC 0 /100 WBCs      Segmented % 55 %      Immature Grans % 1 %      Lymphocytes % 29 %      Monocytes % 11 %      Eosinophils Relative 3 %      Basophils Relative 1 %      Absolute Neutrophils 3.14 Thousands/µL      Absolute Immature Grans 0.04 Thousand/uL      Absolute Lymphocytes 1.60 Thousands/µL      Absolute Monocytes 0.60 Thousand/µL      Eosinophils Absolute 0.15 Thousand/µL      Basophils Absolute 0.03 Thousands/µL              CT stroke alert brain   Final Interpretation by E. Alec Schoenberger, MD (05/30 0953)      No acute intracranial abnormality.      Findings were reported to Nader Padilla  via HIPAA compliant secure electronic messaging at 12:53 p.m.         Workstation performed: [id number]         CTA stroke alert (head/neck)   Final Interpretation by E. Alec Schoenberger, MD (05/30 9425)   Severe stenosis in the proximal right internal carotid artery.   No significant stenosis of the cervical left carotid or vertebral arteries   No significant intracranial stenosis, large vessel occlusion or aneurysm.               Findings were directly discussed with Nader Padilla at 1:01 p.m.      Workstation performed: [id number]             ECG 12 Lead Documentation Only    Date/Time: 5/30/2025 12:30 PM    Performed by: Bianca Hernandez MD  Authorized by: Bianca Hernandez MD    Indications / Diagnosis:  Weakness  ECG reviewed by me, the ED Provider: yes    Patient location:  ED  Previous ECG:     Previous ECG:  Compared to current    Comparison ECG info:  2/21/23 afib with rvr    Similarity:  Changes noted  Interpretation:     Interpretation: abnormal    Rate:     ECG rate:  66    ECG rate assessment: normal    Rhythm:     Rhythm: sinus rhythm    Ectopy:     Ectopy: none    QRS:     QRS axis:  Normal    QRS intervals:  Normal  Conduction:     Conduction: normal    ST segments:     ST segments:  Normal  T waves:     T waves: normal    Other findings:     Other findings: poor R wave progression        ED Medication and Procedure Management   Prior to Admission Medications   Prescriptions Last Dose Informant Patient Reported? Taking?   Eliquis 2.5 MG   Yes No   amoxicillin (AMOXIL) 500 mg capsule  Self Yes No   Sig: Take 2,000 mg by mouth as needed 1 hour prior to dental appointment   docusate sodium (COLACE) 100 mg capsule  Child, Self No No   Sig: Take 1 capsule (100 mg total) by mouth 2 (two) times a day    levothyroxine 125 mcg tablet  Child, Self Yes No   Sig: Take 125 mcg by mouth every morning   midodrine (PROAMATINE) 5 mg tablet   Yes No   Sig: Take 5 mg by mouth 3 (three) times a day   omeprazole (PriLOSEC) 20 mg delayed release capsule  Child, Self Yes No   Sig: Take 20 mg by mouth daily in the early morning   Patient not taking: Reported on 3/26/2025   polyethylene glycol (MIRALAX) 17 g packet  Child, Self No No   Sig: Take 17 g by mouth daily   Patient not taking: Reported on 2/21/2024   simvastatin (ZOCOR) 40 mg tablet  Child, Self Yes No   Sig: Take 1 tablet by mouth daily at bedtime   tamsulosin (FLOMAX) 0.4 mg   Yes No   Sig: TAKE 1 CAPSULE BY MOUTH ONCE DAILY, 1/2 HOUR FOLLOWING THE SAME MEAL EACH DAY.      Facility-Administered Medications: None     Patient's Medications   Discharge Prescriptions    No medications on file     No discharge procedures on file.  ED SEPSIS DOCUMENTATION   Time reflects when diagnosis was documented in both MDM as applicable and the Disposition within this note       Time User Action Codes Description Comment    5/30/2025 12:32 PM Bianca Hernandez [R53.1] Weakness     5/30/2025  1:16 PM Bianca Hernandez [I65.21] Internal carotid artery stenosis, right     5/30/2025  1:56 PM Bianca Hernandez [Z87.19] History of upper gastrointestinal bleeding                      [1]   Past Medical History:  Diagnosis Date    BPH (benign prostatic hyperplasia)     GERD (gastroesophageal reflux disease)     Hyperlipidemia     Hypertension     Hypothyroidism     Kidney stone     Peripheral vascular disease (HCC)     Walker as ambulation aid    [2]   Past Surgical History:  Procedure Laterality Date    COLONOSCOPY      FL RETROGRADE PYELOGRAM  10/18/2022    JOINT REPLACEMENT Right 10/2022    hip    MT CYSTO BLADDER W/URETERAL CATHETERIZATION Left 10/18/2022    Procedure: CYSTOSCOPY RETROGRADE PYELOGRAM WITH INTERPRETATION AND WITH INSERTION STENT URETERAL;  Surgeon: Bronson ROSALES  MD Shyam;  Location: [facility];  Service: Urology    MS CYSTO W/INSERT URETERAL STENT Left 11/15/2022    Procedure: EXCHANGE STENT URETERAL;  Surgeon: Masoud Morrow MD;  Location:  MAIN OR;  Service: Urology    MS CYSTO/URETERO W/LITHOTRIPSY &INDWELL STENT INSRT Left 11/15/2022    Procedure: CYSTOSCOPY URETEROSCOPY WITH LITHOTRIPSY HOLMIUM LASER, RETROGRADE PYELOGRAM AND INSERTION STENT URETERAL;  Surgeon: Masoud Morrow MD;  Location:  MAIN OR;  Service: Urology    SUPRAPUBIC TUBE PLACEMENT N/A 2023    Procedure: INSERTION SUPRAPUBIC CATHETER PERCUTANEOUS;  Surgeon: Gautam Ashton MD;  Location: [facility];  Service: Urology    TRANSURETHRAL RESECTION OF PROSTATE N/A 2023    Procedure: (TURP), SPT insertion;  Surgeon: Gautam Ashton MD;  Location: [facility];  Service: Urology   [3]   Family History  Problem Relation Name Age of Onset    No Known Problems Father      No Known Problems Mother     [4]   Social History  Tobacco Use    Smoking status: Former     Types: Cigars, Pipe     Quit date: 1970     Years since quittin.4    Smokeless tobacco: Never   Vaping Use    Vaping status: Never Used   Substance Use Topics    Alcohol use: Not Currently    Drug use: Never        Bianca Hernandez MD  [phone]

## 2025-05-30 NOTE — ASSESSMENT & PLAN NOTE
Not on Eliquis since 5/23/2025 due to acute GI bleeding requiring intubation, pressors  Scheduled to follow up with cardiology to discuss long-term risks vs benefits of continuing on anticoagulation for hx/o PAF (last documented in 2023)  GI feels patient is high risk for significant re-bleeding event

## 2025-05-30 NOTE — CONSULTS
Consultation - Neurology   Name: Masoud Steward 89 y.o. male I MRN: [id number]  Unit/Bed#: ED 12 I Date of Admission: 5/30/2025   Date of Service: 5/30/2025 I Hospital Day: 0   Consult to Neurology  Consult performed by: Ruth Mejia PA-C  Consult ordered by: Bianca Hernandez MD        Physician Requesting Evaluation: Bianca Hernandez*   Reason for Evaluation / Principal Problem: Transient left lower extremity weakness    Assessment & Plan  Transient weakness of left lower extremity  89-year-old male with A-fib not currently on AC due to recent admission to [facility] for GI bleed, hypertension, dyslipidemia, hypothyroidism, nephrolithiasis, PVD, peripheral neuropathy, presents with transient left lower extremity weakness while grocery shopping, which lasted several minutes in duration and caused him to fall.  NIHSS 1 for left facial asymmetry.   No target for thrombectomy.    Workup:  -CT head demonstrated no acute changes.  -CTA head/neck demonstrated severe stenosis in the proximal right ICA.  -Patient AP/AC naïve just prior to this admission.  He was previously on Eliquis 2.5 mg twice daily for secondary stroke prevention related to A-fib (unclear why he was taking low-dose), but was told to hold the Eliquis for 1 to 2 weeks following his admission on 5/23/2025 for GI bleed.    Suspect possible TIA/stroke in the setting of severe right ICA stenosis vs. due to A-fib not on AC.    Plan:  -Admit to stroke pathway.  -MRI brain without contrast.  -2D echocardiogram.  -Check hemoglobin A1c, lipid panel, TSH.  -Given recent GI bleed, would avoid aspirin.  Recommend consulting GI for comment on whether patient can take Plavix given his severe ICA stenosis.  If patient found to have stroke on MRI and also found to be in A-fib this admission, would also need to discuss restarting his anticoagulation for secondary stroke prevention. If no AF this admission, as per Cardiology consult at [facility], patient to  "consider outpatient Zio/loop to quantify AF burden and determine need for AC going forward.  -Recommend Vascular Surgery consult for R ICA stenosis (patient will likely require carotid ultrasound as well).  -Initiate atorvastatin 40 mg.  -Telemetry.  -PT/OT/speech therapy.  -Allow permissive hypertension, up to 200 systolic.  -Frequent neuro checks.  -Continue to monitor and notify with changes.        Thrombolytic Decision: Patient not a candidate. Symptoms resolved/clearly non disabling.    Recommendations for outpatient neurological follow up have yet to be determined.    History of Present Illness   Hx and PE limited by: n/a  Patient last known well: approx 1130 on 5/30/25  Stroke alert called: 1228  Neurology time of arrival: 1235  HPI: Masoud Steward is a 89 y.o. right handed male with A-fib not currently on AC due to recent admission to [facility] for GI bleed, hypertension, dyslipidemia, hypothyroidism, nephrolithiasis, PVD, peripheral neuropathy, presents with transient left lower extremity weakness.    Patient was shopping at the [employer] when his left lower extremity suddenly became weak and he was unable to bear weight as a result.  He fell \"into the pies\" and was unable to ambulate due to the weakness.  He estimates this weakness lasted approximately 2 to 3 minutes before resolving.  He denied acute symptoms elsewhere, such as arm weakness, facial droop, speech difficulty or vision difficulty.  He denied any lightheadedness or dizziness prior to this fall.  Blood pressure on presentation 136/55.  Patient was not orthostatic.    CT head demonstrated no acute changes.  CTA head/neck demonstrated severe stenosis in the proximal right ICA.  Patient AP/AC naïve just prior to this admission.  He was previously on Eliquis 2.5 mg twice daily for secondary stroke prevention related to A-fib.    Patient was admitted to [facility] from 5/23-27 for acute upper GI bleed.  EGD at that time revealed \"spurting vessel at EGJ " "s/p endoclip x 1 and purastat with successful hemostasis.\"  It was recommended by cardiology that he hold the Eliquis for 1 to 2 weeks and then be seen in the outpatient setting and have risk/benefit discussion regarding restarting anticoagulation.  GI recommended he remain on PPI for 8 weeks, and recommended he remain off NSAIDs.    Neurologic exam significant for subtle left facial asymmetry of unclear chronicity.  NIH stroke scale 1.    Review of Systems  Historical Information   Past Medical History[1]  Past Surgical History[2]  Social History[3]  E-Cigarette/Vaping    E-Cigarette Use Never User      E-Cigarette/Vaping Substances    Nicotine No     THC No     CBD No     Flavoring No     Other No     Unknown No      Family History[4]    Objective :  Temp:  [98.1 °F (36.7 °C)] 98.1 °F (36.7 °C)  HR:  [52-67] 52  BP: (122-136)/(59-69) 127/61  Resp:  [15-18] 15  SpO2:  [97 %-100 %] 97 %  O2 Device: None (Room air)    Physical Exam  General:  Patient is well-developed, well-nourished, and in no acute distress.  HEENT:  Head normocephalic.  Eyes anicteric.    Cardiovascular:  With regular rhythm.  Lungs:  Normal effort. Nonlabored breathing.  Extremities: Left lower extremity slightly more edematous than the right lower extremity.  Compression stockings in place.  Skin: No rashes.    Neurological Exam  Neurologic:  Patient is alert, pleasantly interactive, and appropriately conversational.  No obvious symbolic language difficulty or dysarthria.  Patient correctly stated his current age and the month.    Gait deferred for safety.    Cranial Nerves:   II: Visual fields full to confrontation. Cannot visualize optic fundi.  III,IV,VI: extraocular movements intact with no nystagmus.   V: Sensation in the V1 through V3 distributions intact to light touch bilaterally.   VII: Left nasolabial fold flattening noted.  XII: Tongue midline with no atrophy or fasciculations with appropriate movement.     Coordination:  Accurate " with finger-to-nose and heel-to-shin maneuvers bilaterally.    Motor testing with no upper or lower extremity drift.    Sensory testing grossly intact to light touch throughout.   No extinction with double simultaneous stimulation.    NIHSS:  1a.Level of Consciousness: 0 = Alert   1b. LOC Questions: 0 = Answers both correctly   1c. LOC Commands: 0 = Obeys both correctly   2. Best Gaze: 0 = Normal   3. Visual: 0 = No visual field loss   4. Facial Palsy: 1=Minor paralysis (flattened nasolabial fold, asymmetric on smiling)   5a. Motor Right Arm: 0=No drift, limb holds 90 (or 45) degrees for full 10 seconds   5b. Motor Left Arm: 0=No drift, limb holds 90 (or 45) degrees for full 10 seconds   6a. Motor Right Le=No drift, limb holds 90 (or 45) degrees for full 10 seconds   6b. Motor Left Le=No drift, limb holds 90 (or 45) degrees for full 10 seconds   7. Limb Ataxia:  0=Absent   8. Sensory: 0=Normal; no sensory loss   9. Best Language:  0=No aphasia, normal   10. Dysarthria: 0=Normal articulation   11. Extinction and Inattention (formerly Neglect): 0=No abnormality   Total Score: 1     Time NIHSS was completed: 1255    Modified Queens Score:  0 (No baseline symptoms/disability)      Lab Results: I have reviewed the following results:CBC/BMP:   .     25  1234   WBC 5.56   HGB 11.2*   HCT 34.1*      SODIUM 140   K 4.5      CO2 29   BUN 20   CREATININE 1.11   GLUC 108    , Creatinine Clearance: Estimated Creatinine Clearance: 47.6 mL/min (by C-G formula based on SCr of 1.11 mg/dL)., LFTs:   .     25  1234   AST 30   ALT 35   ALB 3.5   TBILI 0.44   ALKPHOS 42    , PTT/INR:  .     25  1234   PTT 32   INR 1.04        Imaging Results Review: I personally reviewed the following image studies in PACS and associated radiology reports: CT head and CT A h/n. My interpretation of the radiology images/reports is: consistent with [facility].  Other Study Results Review: EKG was reviewed.     VTE  Prophylaxis: VTE covered by:    None       Code Status: Prior  Advance Directive and Living Will:      Power of :    POLST:      Administrative Statements   Critical Care Time Statement: Upon my evaluation, this patient had a high probability of imminent or life-threatening deterioration due to stroke, which required my direct attention, intervention, and personal management.  I spent a total of 55 minutes directly providing critical care services, including interpretation of complex medical databases, evaluating for the presence of life-threatening injuries or illnesses, complex medical decision making (to support/prevent further life-threatening deterioration)., and interpretation of hemodynamic data. This time is exclusive of procedures, teaching, family meetings, and any prior time recorded by providers other than myself.           [1]   Past Medical History:  Diagnosis Date    BPH (benign prostatic hyperplasia)     GERD (gastroesophageal reflux disease)     Hyperlipidemia     Hypertension     Hypothyroidism     Kidney stone     Peripheral vascular disease (HCC)     Walker as ambulation aid    [2]   Past Surgical History:  Procedure Laterality Date    COLONOSCOPY      FL RETROGRADE PYELOGRAM  10/18/2022    JOINT REPLACEMENT Right 10/2022    hip    MA CYSTO BLADDER W/URETERAL CATHETERIZATION Left 10/18/2022    Procedure: CYSTOSCOPY RETROGRADE PYELOGRAM WITH INTERPRETATION AND WITH INSERTION STENT URETERAL;  Surgeon: Bronson Howe MD;  Location:  MAIN OR;  Service: Urology    MA CYSTO W/INSERT URETERAL STENT Left 11/15/2022    Procedure: EXCHANGE STENT URETERAL;  Surgeon: Masoud Morrow MD;  Location:  MAIN OR;  Service: Urology    MA CYSTO/URETERO W/LITHOTRIPSY &INDWELL STENT INSRT Left 11/15/2022    Procedure: CYSTOSCOPY URETEROSCOPY WITH LITHOTRIPSY HOLMIUM LASER, RETROGRADE PYELOGRAM AND INSERTION STENT URETERAL;  Surgeon: Masoud Morrow MD;  Location:  MAIN OR;  Service: Urology     SUPRAPUBIC TUBE PLACEMENT N/A 2023    Procedure: INSERTION SUPRAPUBIC CATHETER PERCUTANEOUS;  Surgeon: Gautam Ashton MD;  Location: [facility];  Service: Urology    TRANSURETHRAL RESECTION OF PROSTATE N/A 2023    Procedure: (TURP), SPT insertion;  Surgeon: Gautam Ashton MD;  Location: [facility];  Service: Urology   [3]   Social History  Tobacco Use    Smoking status: Former     Types: Cigars, Pipe     Quit date:      Years since quittin.4    Smokeless tobacco: Never   Vaping Use    Vaping status: Never Used   Substance and Sexual Activity    Alcohol use: Not Currently    Drug use: Never    Sexual activity: Not Currently   [4]   Family History  Problem Relation Name Age of Onset    No Known Problems Father      No Known Problems Mother

## 2025-05-30 NOTE — ASSESSMENT & PLAN NOTE
89 year old M with PMH on Afib (No AC), hx recent admission for GI bleed, HTN, HLD, peripheral vascular disease, peripheral neuropathy  - c/o transient sudden-onset LLE weakness, resolved after several minutes   - previously on Eliquis, held since 5/23/2025 2/2 GI bleed   - CT stroke alert (head/neck) (5/30):   IMPRESSION:  Severe stenosis in the proximal right internal carotid artery (70-99% stenosis).  No significant stenosis of the cervical left carotid or vertebral arteries  No significant intracranial stenosis, large vessel occlusion or aneurysm.    - Neurology suspects TIA/stroke 2/2 severe right ICA stenosis vs. Afib without coagulation   - MRI Brain ordered, pending   - Echo ordered, pending     AVSS on RA   WBC 5.56, Hgb 11.2   CMP unremarkable, Cr 1.11   INR 1.04, PTT 32     Patient is stable. Symptoms appear to have resolved.     Plan:  - Follow MRI brain to confirm CVA, follow echo   - Ordered carotid duplex to better assess extent of stenosis and inform clinical decision making, for now recommend optimized medical therapy   - Continue statin  - Recommend ASA, DAPT in setting of carotid stenosis, defer to GI consult for guidance with starting patient on these in light of recent GI bleed   - Frequent neurovascular checks   - Appreciate Neurology recommendations  - Other care per primary team   - Discussed case with vascular fellow, Dr. Nixon Guerrero

## 2025-05-30 NOTE — CONSULTS
Consultation - Gastroenterology   Name: Masoud Steward 89 y.o. male I MRN: [id number]  Unit/Bed#: -01 I Date of Admission: 5/30/2025   Date of Service: 5/30/2025 I Hospital Day: 0   Inpatient consult to gastroenterology  Consult performed by: Catalina Gonsalves PA-C  Consult ordered by: Bianca Hernandez MD        Physician Requesting Evaluation: Josefa Wilkinson MD   Reason for Evaluation / Principal Problem: recent GI bleed    Assessment & Plan  GI bleed  89M with recent GI bleed 5/23/2025 on Eliquis + Aleve with source found to be a spurting vessel at EG junction tx with endoclip x1 and purastat; GERD, chronic constipation, hypothyroidism, CHF, HTN and orthostatic hypotension, HLD who presented to the ED on 5/30/2025 for fall due to sudden L leg weakness. GI consulted to comment on risk of antiplatelet in the setting of possible TIA. Labs notable for Hgb 11.2, , BUN WNL.    Plan:  Patient is high risk for significant re-bleeding event on aspirin, antiplatelet, and anticoagulants at this time. It is concerning that he required intubation for EGD 5/23 and subsequently suffered hypotension requiring IV fluids and levophed  Diet per primary team  Continue pantoprazole 40 mg IV Q12 ? resume home regimen omeprazole 20 mg QAM at discharge  Avoid NSAIDs  Monitor H/H for acute anemia  Monitor for s/sx of active GI bleeding  Recommend daily bowel regimen for chronic constipation    Paroxysmal atrial fibrillation (HCC)  Not on Eliquis since 5/23/2025 due to acute GI bleeding requiring intubation, pressors  Scheduled to follow up with cardiology to discuss long-term risks vs benefits of continuing on anticoagulation for hx/o PAF (last documented in 2023)  GI feels patient is high risk for significant re-bleeding event      History of Present Illness   HPI:  Masoud Steward is a 89 y.o. male with recent GI bleed 5/23/2025 on Eliquis + Aleve with source found to be a spurting vessel at EG junction  tx with endoclip x1 and purastat; GERD, chronic constipation, hypothyroidism, CHF, HTN and orthostatic hypotension, HLD who presented to the ED on 5/30/2025 for fall due to sudden L leg weakness. GI consulted to comment on risk of antiplatelet in the setting of possible TIA. Labs notable for Hgb 11.2, , BUN WNL.    Patient has been feeling well since discharged last week from [facility] for hematemesis 2/2 acute upper GI bleeding on Eliquis and Aleve. His stay was complicated by requiring intubation for EGD and subsequently requiring IV fluids and levophed overnight for hypotension. No Eliquis and NSAIDs since then. He was feeling in his usual state of health at the grocery store earlier today when his left leg felt weak and he fell over into the pies.    GI History:  Blood thinners: ASA: no antiplatelet: no anticoagulation: no  NSAID use: none since prior to 5/23  Tobacco use: former smoker  EtOH use: none currently  Insulin use: no    Abdominal Surgical Hx: None  Family Hx: Denies first degree relatives with GI malignancies.  GI procedure Hx:  EGD: 5/23/2025 @ [facility] for hematemesis: spurting vessel at EGJ, tx with endoclip x1 and purastat  Colonoscopy: remotely, unsure exactly when    Review of Systems   Constitutional:  Positive for fatigue. Negative for chills and fever.   HENT:  Negative for sore throat, trouble swallowing and voice change.    Respiratory:  Negative for cough and choking.    Cardiovascular:  Positive for leg swelling (chronic). Negative for chest pain.   Gastrointestinal:  Positive for constipation (chronic, at baseline). Negative for abdominal distention, abdominal pain, anal bleeding, blood in stool, diarrhea, nausea, rectal pain and vomiting.   Skin:  Negative for pallor and rash.   Neurological:  Positive for weakness (left leg). Negative for light-headedness and headaches.   Psychiatric/Behavioral:  Negative for confusion and sleep disturbance. The patient is not nervous/anxious.       Medical History Review: I have reviewed the patient's PMH, PSH, Social History, Family History, Meds, and Allergies     Objective :  Temp:  [97.6 °F (36.4 °C)-98.1 °F (36.7 °C)] 97.6 °F (36.4 °C)  HR:  [52-67] 55  BP: (122-143)/(59-79) 136/76  Resp:  [15-20] 16  SpO2:  [97 %-100 %] 98 %  O2 Device: None (Room air)    Physical Exam  Vitals and nursing note reviewed.   Constitutional:       General: He is not in acute distress.     Appearance: He is not toxic-appearing.   HENT:      Head: Normocephalic and atraumatic.      Mouth/Throat:      Mouth: Mucous membranes are dry.      Pharynx: Oropharynx is clear.     Eyes:      General: No scleral icterus.     Extraocular Movements: Extraocular movements intact.     Neck:      Trachea: Phonation normal.     Cardiovascular:      Rate and Rhythm: Normal rate and regular rhythm.      Heart sounds: No murmur heard.     No friction rub. No gallop.   Pulmonary:      Effort: Pulmonary effort is normal. No respiratory distress.      Breath sounds: No wheezing, rhonchi or rales.   Abdominal:      General: Bowel sounds are normal. There is no distension or abdominal bruit.      Palpations: Abdomen is soft. There is no hepatomegaly or splenomegaly.      Tenderness: There is no abdominal tenderness. There is no guarding or rebound.     Musculoskeletal:      Cervical back: Neck supple.      Right lower leg: 3+ Pitting Edema present.      Left lower leg: 3+ Pitting Edema present.      Comments: Moving all 4 extremities spontaneously     Skin:     General: Skin is warm and dry.      Capillary Refill: Capillary refill takes less than 2 seconds.      Coloration: Skin is not jaundiced or pale.      Findings: Bruising present.     Neurological:      General: No focal deficit present.      Mental Status: He is alert and oriented to person, place, and time.     Psychiatric:         Behavior: Behavior normal. Behavior is cooperative.         Thought Content: Thought content normal.            Lab Results: I have reviewed the following results:CBC/BMP:   .     05/30/25  1234   WBC 5.56   HGB 11.2*   HCT 34.1*      SODIUM 140   K 4.5      CO2 29   BUN 20   CREATININE 1.11   GLUC 108    , Creatinine Clearance: Estimated Creatinine Clearance: 47.6 mL/min (by C-G formula based on SCr of 1.11 mg/dL)., LFTs:   .     05/30/25  1234   AST 30   ALT 35   ALB 3.5   TBILI 0.44   ALKPHOS 42        Imaging Results Review: No pertinent imaging studies reviewed.  Other Study Results Review: Other studies reviewed include: EGD report from 5/23/2025

## 2025-05-30 NOTE — ASSESSMENT & PLAN NOTE
89-year-old male with A-fib not currently on AC due to recent admission to [facility] for GI bleed, hypertension, dyslipidemia, hypothyroidism, nephrolithiasis, PVD, peripheral neuropathy, presents with transient left lower extremity weakness while grocery shopping, which lasted several minutes in duration and caused him to fall.  NIHSS 1 for left facial asymmetry.   No target for thrombectomy.    Workup:  -CT head demonstrated no acute changes.  -CTA head/neck demonstrated severe stenosis in the proximal right ICA.  -Patient AP/AC naïve just prior to this admission.  He was previously on Eliquis 2.5 mg twice daily for secondary stroke prevention related to A-fib (unclear why he was taking low-dose), but was told to hold the Eliquis for 1 to 2 weeks following his admission on 5/23/2025 for GI bleed.    Suspect possible TIA/stroke in the setting of severe right ICA stenosis vs. due to A-fib not on AC.    Plan:  -Admit to stroke pathway.  -MRI brain without contrast.  -2D echocardiogram.  -Check hemoglobin A1c, lipid panel, TSH.  -Given recent GI bleed, would avoid aspirin.  Recommend consulting GI for comment on whether patient can take Plavix given his severe ICA stenosis.  If patient found to have stroke on MRI and also found to be in A-fib this admission, would also need to discuss restarting his anticoagulation for secondary stroke prevention. If no AF this admission, as per Cardiology consult at [facility], patient to consider outpatient Zio/loop to quantify AF burden and determine need for AC going forward.  -Recommend Vascular Surgery consult for R ICA stenosis (patient will likely require carotid ultrasound as well).  -Initiate atorvastatin 40 mg.  -Telemetry.  -PT/OT/speech therapy.  -Allow permissive hypertension, up to 200 systolic.  -Frequent neuro checks.  -Continue to monitor and notify with changes.

## 2025-05-30 NOTE — ASSESSMENT & PLAN NOTE
- Recent hospitalization for GI bleed / hematemesis   - EGD showed spurting vessel at EGJ, s/p endo clip, anticoagulation held  - GI consulted, recommendations appreciated

## 2025-05-30 NOTE — ASSESSMENT & PLAN NOTE
CTA H/N-severe stenosis in the proximal right ICA  Consult vascular surgery  Started on Plavix  Continue atorvastatin

## 2025-05-30 NOTE — ASSESSMENT & PLAN NOTE
- Historic VAS Carotid Duplex bilateral 7/2022: R ICA stenosis 50-69%, hemodynamically significant. L ICA <50% stenosis. Bilateral vertebral arteries with normally directed antegrade flow.   - CT stroke alert 5/30: 70-99% stenosis of right ICA.   - Order Carotid duplex   - See above for plan

## 2025-05-30 NOTE — Clinical Note
Case was discussed with CLARK and the patient's admission status was agreed to be Admission Status: observation status to the service of Dr. Guillaume .

## 2025-05-30 NOTE — ASSESSMENT & PLAN NOTE
89M with recent GI bleed 5/23/2025 on Eliquis + Aleve with source found to be a spurting vessel at EG junction tx with endoclip x1 and purastat; GERD, chronic constipation, hypothyroidism, CHF, HTN and orthostatic hypotension, HLD who presented to the ED on 5/30/2025 for fall due to sudden L leg weakness. GI consulted to comment on risk of antiplatelet in the setting of possible TIA. Labs notable for Hgb 11.2, , BUN WNL.    Plan:  Patient is high risk for significant re-bleeding event on aspirin, antiplatelet, and anticoagulants at this time. It is concerning that he required intubation for EGD 5/23 and subsequently suffered hypotension requiring IV fluids and levophed  Diet per primary team  Continue pantoprazole 40 mg IV Q12 ? resume home regimen omeprazole 20 mg QAM at discharge  Avoid NSAIDs  Monitor H/H for acute anemia  Monitor for s/sx of active GI bleeding  Recommend daily bowel regimen for chronic constipation

## 2025-05-30 NOTE — CONSULTS
Consultation - Vascular Surgery  Name: Masoud Steward 89 y.o. male I MRN: [id number]  Unit/Bed#: -01 I Date of Admission: 5/30/2025   Date of Service: 5/30/2025 I Hospital Day: 0   Inpatient consult to Vascular Surgery  Consult performed by: Mae Tello PA-C  Consult ordered by: Lalo Goldsmith MD        Physician Requesting Evaluation: Josefa Wilkinson MD   Reason for Evaluation / Principal Problem: Internal carotid artery stenosis, right     Assessment & Plan  Transient weakness of left lower extremity  89 year old M with PMH on Afib (No AC), hx recent admission for GI bleed, HTN, HLD, peripheral vascular disease, peripheral neuropathy  - c/o transient sudden-onset LLE weakness, resolved after several minutes   - previously on Eliquis, held since 5/23/2025 2/2 GI bleed   - CT stroke alert (head/neck) (5/30):   IMPRESSION:  Severe stenosis in the proximal right internal carotid artery (70-99% stenosis).  No significant stenosis of the cervical left carotid or vertebral arteries  No significant intracranial stenosis, large vessel occlusion or aneurysm.    - Neurology suspects TIA/stroke 2/2 severe right ICA stenosis vs. Afib without coagulation   - MRI Brain ordered, pending   - Echo ordered, pending     AVSS on RA   WBC 5.56, Hgb 11.2   CMP unremarkable, Cr 1.11   INR 1.04, PTT 32     Patient is stable. Symptoms appear to have resolved.     Plan:  - Follow MRI brain to confirm CVA, follow echo   - Ordered carotid duplex to better assess extent of stenosis and inform clinical decision making, for now recommend optimized medical therapy   - Continue statin  - Recommend ASA, DAPT in setting of carotid stenosis, defer to GI consult for guidance with starting patient on these in light of recent GI bleed   - Frequent neurovascular checks   - Appreciate Neurology recommendations  - Other care per primary team   - Discussed case with vascular fellow, Dr. Nixon Guerrero      Paroxysmal atrial fibrillation (HCC)  -  "Previously on Eliquis 2.5 mg BID  - Held since 5/23/2025 2/2 admission at [facility] for GI bleed   - Echo ordered, telemetry ordered    GI bleed  - Recent hospitalization for GI bleed / hematemesis   - EGD showed spurting vessel at EGJ, s/p endo clip, anticoagulation held  - GI consulted, recommendations appreciated   Internal carotid artery stenosis, right  - Historic VAS Carotid Duplex bilateral 7/2022: R ICA stenosis 50-69%, hemodynamically significant. L ICA <50% stenosis. Bilateral vertebral arteries with normally directed antegrade flow.   - CT stroke alert 5/30: 70-99% stenosis of right ICA.   - Order Carotid duplex   - See above for plan     Orthostatic hypotension        History of Present Illness   Masoud Steward is a 89 y.o. male who presents with transient weakness of left lower extremity today. He was at the grocery store, went to  a pie, left \"off\" and left like he was leaning to his left side.  Denies blurred vision, dizziness, or difficulty speaking during this time. He was brought to [facility] by ambulance where stroke pathway was initiated. CT stroke showed severe right carotid artery stenosis (70-99%). Vascular surgery was consulted for this finding. He patient was admitted to [facility] with neurology and GI consults placed. Of note the patient has Afib for which he previously had been on Eliquis until a recent hospitalization 5/23/2025 for a GI bleed. Previous carotid duplex from 7/2025 showed 50-59% right ICA stenosis. The patent is unsure if he has seen a vascular surgeon for this. Now he is comfortable and eager to eat dinner. He denies fever/chills, vision changes/dizziness. He denies chest pain/palpitations/SOB, he denies difficulty with speech. He denies current weakness. He says he feels \"fine\" now. He denies other complaints or questions.         Review of Systems   Constitutional:  Negative for chills, diaphoresis, fatigue and fever.   HENT:  Negative for drooling, ear pain, sore throat and " trouble swallowing.    Eyes:  Negative for pain and visual disturbance.   Respiratory:  Negative for cough and shortness of breath.    Cardiovascular:  Positive for leg swelling. Negative for chest pain and palpitations.   Gastrointestinal:  Negative for abdominal distention, abdominal pain, blood in stool, nausea and vomiting.   Genitourinary:  Negative for difficulty urinating, dysuria and hematuria.   Musculoskeletal:  Negative for arthralgias and back pain.   Skin:  Negative for color change and rash.   Neurological:  Positive for weakness and light-headedness. Negative for dizziness, seizures, syncope and headaches.   All other systems reviewed and are negative.    Historical Information   Past Medical History[1]  Past Surgical History[2]  Social History[3]  E-Cigarette/Vaping    E-Cigarette Use Never User      E-Cigarette/Vaping Substances    Nicotine No     THC No     CBD No     Flavoring No     Other No     Unknown No        Social History[4]    Current Facility-Administered Medications:     acetaminophen (TYLENOL) tablet 650 mg, Q6H PRN    atorvastatin (LIPITOR) tablet 40 mg, QPM    clopidogrel (PLAVIX) tablet 75 mg, Daily    docusate sodium (COLACE) capsule 100 mg, BID    [START ON 5/31/2025] levothyroxine tablet 125 mcg, QAM    midodrine (PROAMATINE) tablet 10 mg, TID    ondansetron (ZOFRAN) injection 4 mg, Q6H PRN    pantoprazole (PROTONIX) injection 40 mg, Q12H MAX    tamsulosin (FLOMAX) capsule 0.4 mg, Daily With Dinner  Prior to Admission Medications   Prescriptions Last Dose Informant Patient Reported? Taking?   Eliquis 2.5 MG   Yes No   amoxicillin (AMOXIL) 500 mg capsule  Self Yes No   Sig: Take 2,000 mg by mouth as needed 1 hour prior to dental appointment   docusate sodium (COLACE) 100 mg capsule  Child, Self No No   Sig: Take 1 capsule (100 mg total) by mouth 2 (two) times a day   levothyroxine 125 mcg tablet  Child, Self Yes No   Sig: Take 125 mcg by mouth every morning   midodrine (PROAMATINE)  5 mg tablet   Yes No   Sig: Take 5 mg by mouth 3 (three) times a day   omeprazole (PriLOSEC) 20 mg delayed release capsule  Child, Self Yes No   Sig: Take 20 mg by mouth daily in the early morning   Patient not taking: Reported on 3/26/2025   polyethylene glycol (MIRALAX) 17 g packet  Child, Self No No   Sig: Take 17 g by mouth daily   Patient not taking: Reported on 2/21/2024   simvastatin (ZOCOR) 40 mg tablet  Child, Self Yes No   Sig: Take 1 tablet by mouth daily at bedtime   tamsulosin (FLOMAX) 0.4 mg   Yes No   Sig: TAKE 1 CAPSULE BY MOUTH ONCE DAILY, 1/2 HOUR FOLLOWING THE SAME MEAL EACH DAY.      Facility-Administered Medications: None     Patient has no known allergies.    Objective :  Temp:  [97.6 °F (36.4 °C)-98.1 °F (36.7 °C)] 97.6 °F (36.4 °C)  HR:  [52-67] 55  BP: (122-143)/(59-79) 136/76  Resp:  [15-20] 16  SpO2:  [97 %-100 %] 98 %  O2 Device: None (Room air)      Physical Exam  Vitals and nursing note reviewed.   Constitutional:       General: He is not in acute distress.     Appearance: Normal appearance. He is well-developed. He is not ill-appearing, toxic-appearing or diaphoretic.   HENT:      Head: Normocephalic and atraumatic.     Eyes:      Extraocular Movements: Extraocular movements intact.      Conjunctiva/sclera: Conjunctivae normal.     Neck:      Vascular: No carotid bruit.     Cardiovascular:      Rate and Rhythm: Normal rate and regular rhythm.      Pulses: Normal pulses.   Pulmonary:      Effort: Pulmonary effort is normal. No respiratory distress.      Breath sounds: Normal breath sounds.   Abdominal:      General: There is no distension.      Palpations: Abdomen is soft.      Tenderness: There is no abdominal tenderness.     Musculoskeletal:      Cervical back: Neck supple. No tenderness.      Comments: Compression sleeves on bilateral lower extremities intact, SCDs in place.     Palpable radial, femoral, popliteal, and DP pulses bilaterally. good capillary refill present.      Strength 5/5 intact in bilateral upper and lower extremities.      Skin:     General: Skin is warm and dry.      Capillary Refill: Capillary refill takes less than 2 seconds.     Neurological:      General: No focal deficit present.      Mental Status: He is alert and oriented to person, place, and time.      Comments: Face symmetrical on exam, facial nerve intact to motor and sensation.  Vision intact bilaterally.     Strength 5/5 in upper and lower extremities.  Diminished sensation to LLE, per patient is at baseline.    Psychiatric:         Mood and Affect: Mood normal.         Lab Results: I have reviewed the following results:  Recent Labs     05/30/25  1234 05/30/25  1453   WBC 5.56  --    HGB 11.2*  --    HCT 34.1*  --      --    SODIUM 140  --    K 4.5  --      --    CO2 29  --    BUN 20  --    CREATININE 1.11  --    GLUC 108  --    AST 30  --    ALT 35  --    ALB 3.5  --    TBILI 0.44  --    ALKPHOS 42  --    PTT 32  --    INR 1.04  --    HSTNI0 20  --    HSTNI2  --  19             VTE Pharmacologic Prophylaxis: VTE covered by:    None     VTE Mechanical Prophylaxis: sequential compression device             [1]   Past Medical History:  Diagnosis Date    BPH (benign prostatic hyperplasia)     GERD (gastroesophageal reflux disease)     Hyperlipidemia     Hypertension     Hypothyroidism     Kidney stone     Peripheral vascular disease (HCC)     Walker as ambulation aid    [2]   Past Surgical History:  Procedure Laterality Date    COLONOSCOPY      FL RETROGRADE PYELOGRAM  10/18/2022    JOINT REPLACEMENT Right 10/2022    hip    TX CYSTO BLADDER W/URETERAL CATHETERIZATION Left 10/18/2022    Procedure: CYSTOSCOPY RETROGRADE PYELOGRAM WITH INTERPRETATION AND WITH INSERTION STENT URETERAL;  Surgeon: Bronson Howe MD;  Location:  MAIN OR;  Service: Urology    TX CYSTO W/INSERT URETERAL STENT Left 11/15/2022    Procedure: EXCHANGE STENT URETERAL;  Surgeon: Masoud Morrow MD;  Location:   MAIN OR;  Service: Urology    [place] CYSTO/URETERO W/LITHOTRIPSY &INDWELL STENT INSRT Left 11/15/2022    Procedure: CYSTOSCOPY URETEROSCOPY WITH LITHOTRIPSY HOLMIUM LASER, RETROGRADE PYELOGRAM AND INSERTION STENT URETERAL;  Surgeon: Masoud Morrow MD;  Location:  MAIN OR;  Service: Urology    SUPRAPUBIC TUBE PLACEMENT N/A 2023    Procedure: INSERTION SUPRAPUBIC CATHETER PERCUTANEOUS;  Surgeon: Gautam Ashton MD;  [facility];  Service: Urology    TRANSURETHRAL RESECTION OF PROSTATE N/A 2023    Procedure: (TURP), SPT insertion;  Surgeon: Gautam Ashton MD;  [facility];  Service: Urology   [3]   Social History  Tobacco Use    Smoking status: Former     Types: Cigars, Pipe     Quit date: 1970     Years since quittin.4    Smokeless tobacco: Never   Vaping Use    Vaping status: Never Used   Substance and Sexual Activity    Alcohol use: Not Currently    Drug use: Never    Sexual activity: Not Currently   [4]   Social History  Tobacco Use    Smoking status: Former     Types: Cigars, Pipe     Quit date: 1970     Years since quittin.4    Smokeless tobacco: Never   Vaping Use    Vaping status: Never Used   Substance and Sexual Activity    Alcohol use: Not Currently    Drug use: Never    Sexual activity: Not Currently

## 2025-05-30 NOTE — ASSESSMENT & PLAN NOTE
Patient stated he was hypotensive previously and was admitted for 4 weeks at [facility]  Currently on midodrine 10 mg TID from recent DC summary from [facility]

## 2025-05-31 ENCOUNTER — APPOINTMENT (OUTPATIENT)
Dept: MRI IMAGING | Facility: HOSPITAL | Age: OVER 89
End: 2025-05-31
Payer: COMMERCIAL

## 2025-05-31 PROBLEM — K59.09 CHRONIC CONSTIPATION: Status: ACTIVE | Noted: 2025-05-31

## 2025-05-31 LAB
ATRIAL RATE: 66 BPM
CHOLEST SERPL-MCNC: 134 MG/DL (ref ?–200)
HCT VFR BLD AUTO: 37.4 % (ref 36.5–49.3)
HDLC SERPL-MCNC: 43 MG/DL
HGB BLD-MCNC: 12.6 G/DL (ref 12–17)
LDLC SERPL CALC-MCNC: 73 MG/DL (ref 0–100)
P AXIS: 37 DEGREES
PR INTERVAL: 186 MS
QRS AXIS: -11 DEGREES
QRSD INTERVAL: 84 MS
QT INTERVAL: 398 MS
QTC INTERVAL: 417 MS
T WAVE AXIS: 51 DEGREES
TRIGL SERPL-MCNC: 89 MG/DL (ref ?–150)
VENTRICULAR RATE: 66 BPM

## 2025-05-31 PROCEDURE — 85018 HEMOGLOBIN: CPT

## 2025-05-31 PROCEDURE — 93010 ELECTROCARDIOGRAM REPORT: CPT | Performed by: INTERNAL MEDICINE

## 2025-05-31 PROCEDURE — 99232 SBSQ HOSP IP/OBS MODERATE 35: CPT

## 2025-05-31 PROCEDURE — 99232 SBSQ HOSP IP/OBS MODERATE 35: CPT | Performed by: PHYSICIAN ASSISTANT

## 2025-05-31 PROCEDURE — 85014 HEMATOCRIT: CPT

## 2025-05-31 PROCEDURE — 97530 THERAPEUTIC ACTIVITIES: CPT

## 2025-05-31 PROCEDURE — 80061 LIPID PANEL: CPT | Performed by: STUDENT IN AN ORGANIZED HEALTH CARE EDUCATION/TRAINING PROGRAM

## 2025-05-31 PROCEDURE — 97167 OT EVAL HIGH COMPLEX 60 MIN: CPT

## 2025-05-31 PROCEDURE — 70551 MRI BRAIN STEM W/O DYE: CPT

## 2025-05-31 RX ORDER — POLYETHYLENE GLYCOL 3350 17 G/17G
17 POWDER, FOR SOLUTION ORAL DAILY
Status: DISCONTINUED | OUTPATIENT
Start: 2025-05-31 | End: 2025-06-03 | Stop reason: HOSPADM

## 2025-05-31 RX ADMIN — MIDODRINE HYDROCHLORIDE 10 MG: 5 TABLET ORAL at 21:43

## 2025-05-31 RX ADMIN — MIDODRINE HYDROCHLORIDE 10 MG: 5 TABLET ORAL at 08:07

## 2025-05-31 RX ADMIN — ATORVASTATIN CALCIUM 40 MG: 40 TABLET, FILM COATED ORAL at 17:24

## 2025-05-31 RX ADMIN — TAMSULOSIN HYDROCHLORIDE 0.4 MG: 0.4 CAPSULE ORAL at 17:24

## 2025-05-31 RX ADMIN — CLOPIDOGREL 75 MG: 75 TABLET ORAL at 08:07

## 2025-05-31 RX ADMIN — PANTOPRAZOLE SODIUM 40 MG: 40 INJECTION, POWDER, FOR SOLUTION INTRAVENOUS at 21:43

## 2025-05-31 RX ADMIN — MIDODRINE HYDROCHLORIDE 10 MG: 5 TABLET ORAL at 17:24

## 2025-05-31 RX ADMIN — DOCUSATE SODIUM 100 MG: 100 CAPSULE, LIQUID FILLED ORAL at 17:24

## 2025-05-31 RX ADMIN — DOCUSATE SODIUM 100 MG: 100 CAPSULE, LIQUID FILLED ORAL at 08:07

## 2025-05-31 RX ADMIN — PANTOPRAZOLE SODIUM 40 MG: 40 INJECTION, POWDER, FOR SOLUTION INTRAVENOUS at 08:07

## 2025-05-31 RX ADMIN — POLYETHYLENE GLYCOL 3350 17 G: 17 POWDER, FOR SOLUTION ORAL at 15:14

## 2025-05-31 RX ADMIN — LEVOTHYROXINE SODIUM 125 MCG: 100 TABLET ORAL at 05:49

## 2025-05-31 NOTE — ASSESSMENT & PLAN NOTE
Follows with cardiology  On Eliquis previously, held after recent GI bleed at [facility] -- eliquis remains on hold   Monitor on telemetry

## 2025-05-31 NOTE — ASSESSMENT & PLAN NOTE
89 year old M with PMH on Afib (No AC), hx recent admission for GI bleed, HTN, HLD, peripheral vascular disease, peripheral neuropathy  c/o transient sudden-onset LLE weakness, resolved after several minutes    previously on Eliquis, held since 5/23/2025 2/2 GI bleed     - CT stroke alert (head/neck) (5/30):   IMPRESSION:  Severe stenosis in the proximal right internal carotid artery (70-99% stenosis).  No significant stenosis of the cervical left carotid or vertebral arteries  No significant intracranial stenosis, large vessel occlusion or aneurysm.    - Neurology suspects TIA/stroke 2/2 severe right ICA stenosis vs. Afib without coagulation   - MRI Brain ordered, pending   - Echo ordered, pending     AVSS on RA   WBC 5.56, Hgb 11.2   CMP unremarkable, Cr 1.11   INR 1.04, PTT 32   Chol 134  Trig 89  HDL 43  LDL 73    Patient is stable. Symptoms appear to have resolved. No recurrence of symptoms..    Plan:  Follow MRI brain to confirm CVA, follow echo   Ordered carotid duplex to better assess extent of stenosis and inform clinical decision making, for now recommend optimized medical therapy   Continue statin  Recommend ASA, DAPT in setting of carotid stenosis, defer to GI consult for guidance with starting patient on these in light of recent GI bleed   Frequent neurovascular checks   Appreciate Neurology recommendations  Other care per primary team

## 2025-05-31 NOTE — ASSESSMENT & PLAN NOTE
Presenting with transient left lower extremity weakness  CT head, CTA head/neck with severe stenosis in the proximal right ICA  Neurology consulted, recommended stroke pathway   MRI brain: no acute infarct. Mild chronic microangiopathic change   Hemoglobin A1c: 5.6  Lipid panel WNL  Vascular surgery consulted  Carotid US ordered   Continue statin --simvastatin to atorvastatin  Recommending DAPT however deferring to GI given high risk bleed  GI consulted for risk benefit ratio with anticoagulation  Patient was resumed on Plavix yesterday 5/30.  Per conversation with GI 5/31 patient is high risk for recurrent bleed however given concern for stroke and severe stenosis, ok to continue on antiplatelet w/ plavix. If neuro recommending DAPT, ok with GI with close monitoring  Telemetry, ECHO pending   PT/OT/speech  Neurochecks

## 2025-05-31 NOTE — OCCUPATIONAL THERAPY NOTE
"    Occupational Therapy Evaluation & Tx     Patient Name: Masoud Steward  Today's Date: 5/31/2025  Problem List  Principal Problem:    Transient weakness of left lower extremity  Active Problems:    Hypothyroidism    Paroxysmal atrial fibrillation (HCC)    GI bleed    Internal carotid artery stenosis, right    Orthostatic hypotension    Past Medical History  Past Medical History[1]  Past Surgical History  Past Surgical History[2]          05/31/25 0968   OT Last Visit   OT Visit Date 05/31/25   Note Type   Note type Evaluation   Restrictions/Precautions   Weight Bearing Precautions Per Order No   Other Precautions Bed Alarm;Chair Alarm;Fall Risk;Telemetry   Home Living   Type of Home House   Home Layout One level  (2 v 1 DUARTE)   Home Equipment Other (Comment);Cane;Sock aid;Long-handled shoehorn;Reacher;Walker  (Rollator)   Additional Comments Rollator in house, SPC in community. States he johnson next to the cart return at stores & will use the cart. Sleeps in recliner   Prior Function   Level of [place] Independent with ADLs;Independent with functional mobility;Needs assistance with IADLS   Lives With Alone   Receives Help From Family  (Dtrs)   IADLs Independent with driving;Family/Friend/Other provides meals;Family/Friend/Other provides medication management  (Dtr organizes pill for the month & provides meals. \"I heat & eat\")   Falls in the last 6 months 1 to 4  (2)   Vocational Retired  ()   Lifestyle   Autonomy IND with ADLs, utilizes sock & LHSH for LB dressing. A for IADLs, (+)    Reciprocal Relationships Supportive daughters   Service to Others Retired sheet metal    Intrinsic Gratification Used to enjoy golfing, enjoys watching the oldies   General   Additional Pertinent History Pt presents with fall at grocery store. Pt describes it as a \"near fall\" & suddenly feeling like he was leaning to the left when shopping for pie. States a woman was standing next to him & " "caught him   Family/Caregiver Present No   Subjective   Subjective \"Oh you like jokes? I've got lots of them\"   ADL   Eating Assistance 7  Independent   Grooming Assistance 7  Independent   Grooming Deficit Setup   UB Bathing Assistance 5  Supervision/Setup   LB Bathing Assistance 5  Supervision/Setup   UB Dressing Assistance 5  Supervision/Setup   LB Dressing Assistance 4  Minimal Assistance   LB Dressing Deficit Don/doff R sock;Don/doff L sock  (Overall Min - required A today to don socks as pt typically uses sock aid)   Toileting Assistance  5  Supervision/Setup   Toileting Deficit Use of bedpan/urinal setup  (urinal sitting EOB)   Bed Mobility   Supine to Sit 5  Supervision   Additional items HOB elevated;Bedrails;Increased time required   Transfers   Sit to Stand 5  Supervision   Additional items Bedrails;Increased time required   Stand to Sit 5  Supervision   Additional items Bedrails;Increased time required   Additional Comments STS 2x - orthostatic BPs taken (-)   Functional Mobility   Functional Mobility 5  Supervision   Additional Comments x8 ft  (See tx session for additional mobility)   Additional items Rolling walker   Balance   Static Sitting Normal   Dynamic Sitting Good   Static Standing Good   Dynamic Standing Fair +   Ambulatory Fair +   Activity Tolerance   Activity Tolerance Patient tolerated treatment well   Medical Staff Made Aware MIRIAM Casas   Nurse Made Aware GUADALUPE SANTIAGO Assessment   RUE Assessment WFL   LUE Assessment   LUE Assessment WFL   Hand Function   Gross Motor Coordination Functional   Fine Motor Coordination Functional   Vision-Basic Assessment   Current Vision Wears glasses all the time   Cognition   Overall Cognitive Status WFL   Arousal/Participation Alert   Attention Within functional limits   Orientation Level Oriented X4   Memory Within functional limits   Following Commands Follows all commands and directions without difficulty   Assessment   Limitation Decreased ADL " status;Decreased high-level ADLs;Decreased self-care trans;Decreased endurance   Prognosis Good   Assessment Pt is a 89 y.o. male seen for OT evaluation at [facility], admitted 5/30/2025 w/ Transient weakness of left lower extremity. OT completed extensive review of pt's medical and social history. Comorbidities affecting pt's functional performance at time of assessment include: PAF, h/o GI bleed, h/o orthostatic hypotension, polyneuropathy, HTN, h/o falls, PVD. Personal factors affecting pt at time of IE include: steps to enter environment, limited home support, difficulty performing ADLS, difficulty performing IADLS , and environment. Prior to admission, pt was living alone in a 1Sh with 1 v 2 DUARTE.  Pt was I w/  ADLS and A w/ IADLS, (+) drove, & required use of rollator PTA. Upon evaluation: Pt requires S for bed mobility, S for functional transfers, S for functional mobility, S for UB ADLs and Min A for LB ADLS 2* the following deficits impacting occupational performance: weakness and decreased balance. Full objective findings from OT assessment regarding body systems outlined above. Pt to benefit from continued skilled OT tx while in the hospital to address deficits as defined above and maximize level of functional independence w/ ADL's and functional mobility. Occupational Performance areas to address include: bathing/shower, toilet hygiene, dressing, functional mobility, community mobility, and clothing management. Based on findings, pt is of high complexity. The patient's raw score on the AM-PAC Daily Activity inpatient short form is 20, standardized score is 42.03, greater than 39.4. Patients at this level are likely to benefit from DC to home. However, please refer to therapist recommendation for discharge planning given other factors that may influence destination. At this time, OT recommendations at time of discharge are DC with No rehabilitation needs.   Goals   Patient Goals Pt wants to go  home   Plan   Treatment Interventions ADL retraining;Functional transfer training;Endurance training;Patient/family training;Equipment evaluation/education;Compensatory technique education;Continued evaluation   Goal Expiration Date 06/10/25   OT Treatment Day 0   OT Frequency 1-2x/wk   Discharge Recommendation   Rehab Resource Intensity Level, OT No post-acute rehabilitation needs   AM-PAC Daily Activity Inpatient   Lower Body Dressing 3   Bathing 3   Toileting 3   Upper Body Dressing 3   Grooming 4   Eating 4   Daily Activity Raw Score 20   Daily Activity Standardized Score (Calc for Raw Score >=11) 42.03   AM-PAC Applied Cognition Inpatient   Following a Speech/Presentation 4   Understanding Ordinary Conversation 4   Taking Medications 4   Remembering Where Things Are Placed or Put Away 4   Remembering List of 4-5 Errands 4   Taking Care of Complicated Tasks 4   Applied Cognition Raw Score 24   Applied Cognition Standardized Score 62.21   Additional Treatment Session   Start Time 0950   End Time 1004   Treatment Assessment Pt seated in recliner, agreeable to additional functional mobility trial. Pt able to perform sit <> stands at supervision level. Pt perform ~60 ft functional mobility with S & RW. Pt denied dizziness/lightheadedness. Pt denied fatigue/weakness & states he feels he is at his baseline. Pt left seated OOB to recliner, all needs in reach, (+) chair alarm, RN aware.   End of Consult   Education Provided Yes   Patient Position at End of Consult Bedside chair;Bed/Chair alarm activated;All needs within reach   Nurse Communication Nurse aware of consult     Pt will achieve the following goals within 10 days.    *Pt will complete LB bathing and dressing with Mod I & AD PRN.    *Pt will complete toileting w/ Mod I w/ G hygiene/thoroughness using AD PRN    *Pt will verbalize and demonstrate good body mechanics and joint protection techniques during  ADLs/ IADLs with no verbal cues.    *Pt will perform  functional transfers with on/off all surfaces with Mod I using DME as needed w/ G balance/safety.    *Pt will improve functional mobility during ADL/IADL/leisure tasks to Mod I using DME as needed w/ G balance/safety.    *Pt will demonstrate G carryover of pt/caregiver education and training as appropriate w/ Mod I w/o cues w/ good tolerance    *Assess DME needs    Karen Brady OTR/L              [1]   Past Medical History:  Diagnosis Date    BPH (benign prostatic hyperplasia)     GERD (gastroesophageal reflux disease)     Hyperlipidemia     Hypertension     Hypothyroidism     Kidney stone     Peripheral vascular disease (HCC)     Walker as ambulation aid    [2]   Past Surgical History:  Procedure Laterality Date    COLONOSCOPY      FL RETROGRADE PYELOGRAM  10/18/2022    JOINT REPLACEMENT Right 10/2022    hip    NV CYSTO BLADDER W/URETERAL CATHETERIZATION Left 10/18/2022    Procedure: CYSTOSCOPY RETROGRADE PYELOGRAM WITH INTERPRETATION AND WITH INSERTION STENT URETERAL;  Surgeon: Bronson Howe MD;  Location:  MAIN OR;  Service: Urology    NV CYSTO W/INSERT URETERAL STENT Left 11/15/2022    Procedure: EXCHANGE STENT URETERAL;  Surgeon: Masoud Morrow MD;  Location:  MAIN OR;  Service: Urology    NV CYSTO/URETERO W/LITHOTRIPSY &INDWELL STENT INSRT Left 11/15/2022    Procedure: CYSTOSCOPY URETEROSCOPY WITH LITHOTRIPSY HOLMIUM LASER, RETROGRADE PYELOGRAM AND INSERTION STENT URETERAL;  Surgeon: Masoud Morrow MD;  Location:  MAIN OR;  Service: Urology    SUPRAPUBIC TUBE PLACEMENT N/A 2/23/2023    Procedure: INSERTION SUPRAPUBIC CATHETER PERCUTANEOUS;  Surgeon: Gautam Ashton MD;  [facility];  Service: Urology    TRANSURETHRAL RESECTION OF PROSTATE N/A 2/23/2023    Procedure: (TURP), SPT insertion;  Surgeon: Gautam Ashton MD;  [facility];  Service: Urology

## 2025-05-31 NOTE — PLAN OF CARE
Problem: OCCUPATIONAL THERAPY ADULT  Goal: Performs self-care activities at highest level of function for planned discharge setting.  See evaluation for individualized goals.  Description: Treatment Interventions: ADL retraining, Functional transfer training, Endurance training, Patient/family training, Equipment evaluation/education, Compensatory technique education, Continued evaluation          See flowsheet documentation for full assessment, interventions and recommendations.   Note: Limitation: Decreased ADL status, Decreased high-level ADLs, Decreased self-care trans, Decreased endurance  Prognosis: Good  Assessment: Pt is a 89 y.o. male seen for OT evaluation at [facility], admitted 5/30/2025 w/ Transient weakness of left lower extremity. OT completed extensive review of pt's medical and social history. Comorbidities affecting pt's functional performance at time of assessment include: PAF, h/o GI bleed, h/o orthostatic hypotension, polyneuropathy, HTN, h/o falls, PVD. Personal factors affecting pt at time of IE include: steps to enter environment, limited home support, difficulty performing ADLS, difficulty performing IADLS , and environment. Prior to admission, pt was living alone in a 1Sh with 1 v 2 DUARTE.  Pt was I w/  ADLS and A w/ IADLS, (+) drove, & required use of rollator PTA. Upon evaluation: Pt requires S for bed mobility, S for functional transfers, S for functional mobility, S for UB ADLs and Min A for LB ADLS 2* the following deficits impacting occupational performance: weakness and decreased balance. Full objective findings from OT assessment regarding body systems outlined above. Pt to benefit from continued skilled OT tx while in the hospital to address deficits as defined above and maximize level of functional independence w/ ADL's and functional mobility. Occupational Performance areas to address include: bathing/shower, toilet hygiene, dressing, functional mobility, community  mobility, and clothing management. Based on findings, pt is of high complexity. The patient's raw score on the AM-PAC Daily Activity inpatient short form is 20, standardized score is 42.03, greater than 39.4. Patients at this level are likely to benefit from DC to home. However, please refer to therapist recommendation for discharge planning given other factors that may influence destination. At this time, OT recommendations at time of discharge are DC with No rehabilitation needs.     Rehab Resource Intensity Level, OT: No post-acute rehabilitation needs

## 2025-05-31 NOTE — CASE MANAGEMENT
Case Management Assessment & Discharge Planning Note    Patient name Masoud Steward  Location /-01 MRN [id number]  : 1934 Date 2025       Current Admission Date: 2025  Current Admission Diagnosis:Transient weakness of left lower extremity   Patient Active Problem List    Diagnosis Date Noted    Transient weakness of left lower extremity 2025    Internal carotid artery stenosis, right 2025    GI bleed 2025    Orthostatic hypotension 2024    Paroxysmal atrial fibrillation (HCC) 2023    BPH (benign prostatic hyperplasia)     Peripheral vascular disease (HCC)     Preoperative clearance 2022    Drug-induced constipation 2022    Cognitive decline 10/26/2022    Hyponatremia 10/20/2022    Status post fall 10/19/2022    Urinary retention 10/19/2022    History of fracture of right hip 10/19/2022    Lumbar compression fracture (HCC) 10/19/2022    Abnormal urinalysis 10/18/2022    Disorientation 10/18/2022    Hydronephrosis with urinary obstruction due to renal calculus 10/18/2022    Essential (primary) hypertension 10/08/2022    Gastro-esophageal reflux disease without esophagitis 10/08/2022    Hyperlipidemia 10/08/2022    Polyneuropathy, peripheral sensorimotor axonal     Hypothyroidism 2007      LOS (days): 0  Geometric Mean LOS (GMLOS) (days):   Days to GMLOS:     OBJECTIVE:          Current admission status: Observation       Preferred Pharmacy:   [id number] - [place] - [address]  [address] [place] [address]  Phone: [phone] Fax: [phone]    Primary Care Provider: Ishaan Enamorado MD    Primary Insurance: [employer]  Secondary Insurance:     ASSESSMENT:  Active Health Care Proxies       DmitriyDali Health Care Agent - Daughter   Primary Phone: [phone] (Mobile)                 Advance Directives  Does patient have a Health Care POA?: Yes  Does patient have Advance  Directives?: Yes  Advance Directives: Living will, Power of  for health care  Primary Contact: Dali Izaguirre: dtr: [phone]    Readmission Root Cause  30 Day Readmission: No    Patient Information  Admitted from:: Home  Mental Status: Alert  During Assessment patient was accompanied by: Not accompanied during assessment  Assessment information provided by:: Patient  Primary Caregiver: Self  Support Systems: Self, Children, Family members  County of Residence: [place]  What city do you live in?: [place]  Home entry access options. Select all that apply.: Stairs  Number of steps to enter home.: 2  Do the steps have railings?: No  Type of Current Residence: [facility]  Living Arrangements: Lives Alone  Is patient a ?: Yes    Activities of Daily Living Prior to Admission  Functional Status: Independent  Completes ADLs independently?: Yes  Ambulates independently?: Yes (ambulates with walker)  Does patient use assisted devices?: Yes  Assisted Devices (DME) used: Walker, Straight Cane  Does patient currently own DME?: Yes  What DME does the patient currently own?: Walker, Straight Cane  Does patient have a history of Outpatient Therapy (PT/OT)?: Yes  Does the patient have a history of Short-Term Rehab?: Yes  Does patient have a history of HHC?: Yes  Does patient currently have HHC?: No    Patient Information Continued  Income Source: Pension/prison  Does patient have prescription coverage?: Yes  Can the patient afford their medications and any related supplies (such as glucometers or test strips)?: Yes  Does patient receive dialysis treatments?: No  Does patient have a history of substance abuse?: No  Does patient have a history of Mental Health Diagnosis?: No    Means of Transportation  [facility] Self    DISCHARGE DETAILS:    Discharge planning discussed with:: patient  Freedom of Choice: Yes  Comments - Freedom of Choice: unsure of discharge needs at this time, await  PT/OT eval    Additional Comments: Met with Pt. Pt presents AA&Ox4. Discussed role of case management. Pt lives alone in 1sh, 2 ming, no railings. Uses walker for ambulation and owns cane. Drives, grocery shopping. Uses [facility] in [place] and able to afford prescriptions. Pt's dtrs are POA and he has living will. Hx of VNA, SNF and outpt PT/OT. Unable to recall name of VNA agency and SNF. Denies hx of MH/D&A. Pt is unsure of discharge needs at this time. Await PT/OT. CM to follow.

## 2025-05-31 NOTE — UTILIZATION REVIEW
Initial Clinical Review    OBS 05-30-25 @ 1458 CONVERTED TO INPATIENT ADMISSION 05-31-25 @ 1458 FOR CONTINUATION OF CARE FOR TRANSIENT WEAKNESS OF LEFT LOWER EXTERMITY.   INPATIENT ADMISSION  Once        Transfer Service: Hospitalist   Question Answer Comment   Level of Care Med Surg    Estimated length of stay More than 2 Midnights    Certification I certify that inpatient services are medically necessary for this patient for a duration of greater than two midnights. See H&P and MD Progress Notes for additional information about the patient's course of treatment.        05/31/25 1458     Admission: Date/Time/Statement:   Admission Orders (From admission, onward)       Ordered        05/31/25 1458  INPATIENT ADMISSION  Once            05/30/25 1458  Place in Observation  Once                          Orders Placed This Encounter   Procedures    Place in Observation     Standing Status:   Standing     Number of Occurrences:   1     Level of Care:   Med Surg [16]    INPATIENT ADMISSION     Standing Status:   Standing     Number of Occurrences:   1     Level of Care:   Med Surg [16]     Estimated length of stay:   More than 2 Midnights     Certification:   I certify that inpatient services are medically necessary for this patient for a duration of greater than two midnights. See H&P and MD Progress Notes for additional information about the patient's course of treatment.     ED Arrival Information       Expected   -    Arrival   5/30/2025 12:23    Acuity   Emergent              Means of arrival   Ambulance    Escorted by   [facility]    Service   Hospitalist    Admission type   Emergency              Arrival complaint   General Weakness             Chief Complaint   Patient presents with    Weakness - Generalized     Pt to ED via EMS. EMS reports that pt was grocery shopping when he suddenly became very weak. EMS states that pt did not fall. Pt denies any pain at this time.        Initial Presentation: 89 y.o.  "male presented to ED via EMS as observation for Transient weakness of left lower extremity. PMHx  A-fib not currently on AC due to recent admission to [facility] for GI bleed, hypertension, dyslipidemia, hypothyroidism, nephrolithiasis, PVD, peripheral neuropathy, presents with transient left lower extremity weakness. atient was shopping at the [employer] when his left lower extremity suddenly became weak and he was unable to bear weight as a result. He fell \"into the pies\" and was unable to ambulate due to the weakness. He estimates this weakness lasted approximately 2 to 3 minutes before resolving. Exam benign. Plan Consult GI,neurology, vascular surgery telemetry,PT/OT/ST, MRI, ECHO vs and neuro checks q 1 hr x 4 hrs q2 hrs x 8 hrs q 4 hrs x 72 hrs A+SCD and supportive care    Neurology consult:  PI: Masoud Steward is a 89 y.o. right handed male with A-fib not currently on AC due to recent admission to [facility] for GI bleed, hypertension, dyslipidemia, hypothyroidism, nephrolithiasis, PVD, peripheral neuropathy, presents with transient left lower extremity weakness.  Patient was shopping at the [employer] when his left lower extremity suddenly became weak and he was unable to bear weight as a result.  He fell \"into the pies\" and was unable to ambulate due to the weakness.  He estimates this weakness lasted approximately 2 to 3 minutes before resolving.  He denied acute symptoms elsewhere, such as arm weakness, facial droop, speech difficulty or vision difficulty.  He denied any lightheadedness or dizziness prior to this fall.  Blood pressure on presentation 136/55.  Patient was not orthostatic.   CT head demonstrated no acute changes.  CTA head/neck demonstrated severe stenosis in the proximal right ICA.  Patient AP/AC naïve just prior to this admission.  He was previously on Eliquis 2.5 mg twice daily for secondary stroke prevention related to A-fib.    Vascular surgery:  Transient weakness of left lower extremity  89 " year old M with PMH on Afib (No AC), hx recent admission for GI bleed, HTN, HLD, peripheral vascular disease, peripheral neuropathy  - c/o transient sudden-onset LLE weakness, resolved after several minutes   - previously on Eliquis, held since 5/23/2025 2/2 GI bleed   - CT stroke alert (head/neck) (5/30):   IMPRESSION:  Severe stenosis in the proximal right internal carotid artery (70-99% stenosis).  No significant stenosis of the cervical left carotid or vertebral arteries  No significant intracranial stenosis, large vessel occlusion or aneurysm.     - Neurology suspects TIA/stroke 2/2 severe right ICA stenosis vs. Afib without coagulation   - MRI Brain ordered, pending   - Echo ordered, pending      AVSS on RA   WBC 5.56, Hgb 11.2   CMP unremarkable, Cr 1.11   INR 1.04, PTT 32      Patient is stable. Symptoms appear to have resolved.      Plan:  - Follow MRI brain to confirm CVA, follow echo   - Ordered carotid duplex to better assess extent of stenosis and inform clinical decision making, for now recommend optimized medical therapy   - Continue statin  - Recommend ASA, DAPT in setting of carotid stenosis, defer to GI consult for guidance with starting patient on these in light of recent GI bleed   - Frequent neurovascular checks     GI consult:  GI bleed  89M with recent GI bleed 5/23/2025 on Eliquis + Aleve with source found to be a spurting vessel at EG junction tx with endoclip x1 and purastat; GERD, chronic constipation, hypothyroidism, CHF, HTN and orthostatic hypotension, HLD who presented to the ED on 5/30/2025 for fall due to sudden L leg weakness. GI consulted to comment on risk of antiplatelet in the setting of possible TIA. Labs notable for Hgb 11.2, , BUN WNL.     Plan:  Patient is high risk for significant re-bleeding event on aspirin, antiplatelet, and anticoagulants at this time. It is concerning that he required intubation for EGD 5/23 and subsequently suffered hypotension requiring IV  fluids and levophed  Diet per primary team  Continue pantoprazole 40 mg IV Q12 ? resume home regimen omeprazole 20 mg QAM at discharge  Avoid NSAIDs  Monitor H/H for acute anemia  Monitor for s/sx of active GI bleeding  Recommend daily bowel regimen for chronic constipation      Anticipated Length of Stay/Certification Statement: Patient will be admitted on an observation basis with an anticipated length of stay of less than 2 midnights secondary to Transient weakness.     Date: 05-31-25 INPATIENT    Day 2: Severe stenosis in the proximal right internal carotid artery (70-99% stenosis). Plan  MRI brain to confirm CVA, follow echo carotid duplex  ASA, DAPT in setting of carotid stenosis, defer to GI consult for guidance with starting patient on these in light of recent GI bleed Frequent neurovascular checks telemetry, monitor for active bleeding PT/OT IV PPI continue midodrine and monitor BP's and supportive care     ED Treatment-Medication Administration from 05/30/2025 1223 to 05/30/2025 1514         Date/Time Order Dose Route Action     05/30/2025 1248 iohexol (OMNIPAQUE) 350 MG/ML injection (MULTI-DOSE) 100 mL 85 mL Intravenous Given            Scheduled Medications:  atorvastatin, 40 mg, Oral, QPM  clopidogrel, 75 mg, Oral, Daily  docusate sodium, 100 mg, Oral, BID  levothyroxine, 125 mcg, Oral, QAM  midodrine, 10 mg, Oral, TID  pantoprazole, 40 mg, Intravenous, Q12H MAX  polyethylene glycol, 17 g, Oral, Daily  tamsulosin, 0.4 mg, Oral, Daily With Dinner      Continuous IV Infusions:     PRN Meds:  acetaminophen, 650 mg, Oral, Q6H PRN  ondansetron, 4 mg, Intravenous, Q6H PRN      ED Triage Vitals   Temperature Pulse Respirations Blood Pressure SpO2 Pain Score   05/30/25 1231 05/30/25 1225 05/30/25 1230 05/30/25 1225 05/30/25 1225 05/30/25 1231   98.1 °F (36.7 °C) 65 18 136/65 98 % No Pain     Weight (last 2 days)       Date/Time Weight    05/30/25 1302 87.1 (192.02)            Vital Signs (last 3 days)        Date/Time Temp Pulse Resp BP MAP (mmHg) SpO2 O2 Device Patient Position - Orthostatic VS [place] Coma Scale Score Pain    05/31/25 17:07:12 98.5 °F (36.9 °C) 59 17 119/59 79 97 % -- -- -- --    05/31/25 14:38:09 97.8 °F (36.6 °C) 61 18 118/60 79 97 % None (Room air) Sitting -- --    05/31/25 1411 -- 62 -- -- -- 96 % -- -- -- --    05/31/25 13:11:04 98 °F (36.7 °C) 63 17 121/59 80 99 % None (Room air) Sitting -- --    05/31/25 1200 98 °F (36.7 °C) 63 17 121/58 -- 99 % -- -- 15 --    05/31/25 09:55:17 -- 73 -- 116/57 77 98 % -- Other (Comment) -- --    05/31/25 09:44:15 -- 71 -- 117/57 77 97 % -- Sitting - Orthostatic VS -- --    05/31/25 09:42:14 -- 81 -- 167/134 145 98 % -- Sitting - Orthostatic VS -- --    05/31/25 09:39:14 -- 66 -- 129/65 86 98 % -- Lying - Orthostatic VS -- --    05/31/25 08:08:19 97.7 °F (36.5 °C) 56 -- 124/64 84 96 % -- -- -- --    05/31/25 0800 97.7 °F (36.5 °C) 56 17 124/64 -- 96 % None (Room air) -- 15 No Pain    05/31/25 0554 -- 54 -- -- -- 97 % -- -- -- --    05/31/25 04:30:08 97.7 °F (36.5 °C) 56 17 124/66 85 98 % None (Room air) Lying -- --    05/31/25 0400 97.7 °F (36.5 °C) 56 17 124/66 -- 98 % -- -- -- --    05/31/25 00:51:16 -- 58 -- 124/65 85 98 % -- -- -- --    05/31/25 0000 97.8 °F (36.6 °C) 58 17 124/65 -- 98 % -- -- -- --    05/30/25 2300 -- -- -- -- -- -- -- -- -- No Pain    05/30/25 19:57:51 97.8 °F (36.6 °C) 70 17 120/54 76 96 % -- -- -- --    05/30/25 1955 97.8 °F (36.6 °C) 69 17 120/54 -- 98 % -- -- -- --    05/30/25 1900 -- -- -- -- -- -- None (Room air) -- 15 No Pain    05/30/25 1613 97.6 °F (36.4 °C) 55 16 136/76 -- 98 % -- -- -- --    05/30/25 1559 97.8 °F (36.6 °C) 60 17 139/79 -- 100 % -- -- -- --    05/30/25 1545 97.8 °F (36.6 °C) 59 18 141/79 -- 100 % -- -- -- --    05/30/25 1527 -- -- -- -- -- 97 % None (Room air) -- 15 No Pain    05/30/25 1524 97.7 °F (36.5 °C) 59 18 140/71 -- 97 % -- -- -- --    05/30/25 15:17:58 97.7 °F (36.5 °C) 59 18 140/71 94 97 % None (Room  air) Lying -- --    05/30/25 1500 -- -- -- -- -- -- -- -- 15 --    05/30/25 1445 -- 58 20 143/72 98 99 % -- -- 15 --    05/30/25 1430 -- 57 19 143/72 98 99 % -- -- 15 --    05/30/25 1415 -- 52 15 127/61 88 97 % -- -- 15 --    05/30/25 1400 -- 55 18 132/63 91 98 % None (Room air) -- 15 No Pain    05/30/25 1345 -- 53 16 130/64 89 98 % None (Room air) -- 15 No Pain    05/30/25 1330 -- 58 18 130/62 89 100 % None (Room air) -- 15 No Pain    05/30/25 1315 -- 64 18 125/59 80 97 % -- -- 15 --    05/30/25 1301 -- 67 -- 126/60 -- -- -- Standing - Orthostatic VS -- --    05/30/25 1300 -- -- -- -- -- -- -- -- 15 --    05/30/25 1259 -- 63 -- 122/66 -- -- -- Sitting - Orthostatic VS -- --    05/30/25 1258 -- 62 -- 125/69 -- -- -- Lying - Orthostatic VS -- --    05/30/25 1245 -- 60 15 130/63 90 98 % None (Room air) -- -- --    05/30/25 1231 98.1 °F (36.7 °C) -- -- -- -- -- -- -- -- No Pain    05/30/25 1230 -- 64 18 -- -- 99 % -- -- 15 --    05/30/25 1225 -- 65 -- 136/65 93 98 % -- -- -- --              Pertinent Labs/Diagnostic Test Results:   Radiology:  MRI brain wo contrast   Final Interpretation by Robin Colon MD (05/31 1321)      No acute infarct. Mild chronic microangiopathic change.      Workstation performed: [id number]         CT stroke alert brain   Final Interpretation by E. Alec Schoenberger, MD (05/30 5696)      No acute intracranial abnormality.      Findings were reported to Nader Padilla  via HIPAA compliant secure electronic messaging at 12:53 p.m.         Workstation performed: [id number]         CTA stroke alert (head/neck)   Final Interpretation by E. Alec Schoenberger, MD (05/30 1829)   Severe stenosis in the proximal right internal carotid artery.   No significant stenosis of the cervical left carotid or vertebral arteries   No significant intracranial stenosis, large vessel occlusion or aneurysm.               Findings were directly discussed with Nader Padilla at 1:01 p.m.      Workstation performed:  [id number]         VAS carotid complete study    (Results Pending)     Cardiology:  ECG 12 lead    by Interface, Ris Results In (05/30 1230)        GI:  No orders to display           Results from last 7 days   Lab Units 05/31/25  1410 05/30/25  1954 05/30/25  1234   WBC Thousand/uL  --   --  5.56   HEMOGLOBIN g/dL 12.6 11.8* 11.2*   HEMATOCRIT % 37.4 36.5 34.1*   PLATELETS Thousands/uL  --   --  205   TOTAL NEUT ABS Thousands/µL  --   --  3.14         Results from last 7 days   Lab Units 05/30/25  1234 05/26/25  0919 05/25/25  0336   SODIUM mmol/L 140 138 139   POTASSIUM mmol/L 4.5 4 4   CHLORIDE mmol/L 105 109 110*   CO2 mmol/L 29 24 27   ANION GAP mmol/L 6 5 2*   BUN mg/dL 20 28 27   CREATININE mg/dL 1.11 1.02 0.95   EGFR ml/min/1.73sq m 57 69 76   CALCIUM mg/dL 9.0 8.7 8.1*     Results from last 7 days   Lab Units 05/30/25  1234 05/26/25  0919 05/25/25  0336   AST U/L 30 19 15   ALT U/L 35 11 11   ALK PHOS U/L 42 39 33*   TOTAL PROTEIN g/dL 6.1* 6.1* 5*   ALBUMIN g/dL 3.5 3.5 3*   TOTAL BILIRUBIN mg/dL 0.44 0.7 0.9     Results from last 7 days   Lab Units 05/30/25  1252   POC GLUCOSE mg/dl 93     Results from last 7 days   Lab Units 05/30/25  1234 05/26/25  0919 05/25/25  0336   GLUCOSE RANDOM mg/dL 108 129* 111*         Results from last 7 days   Lab Units 05/30/25  1234   HEMOGLOBIN A1C % 5.6   EAG mg/dl 114     No results found for: \"BETA-HYDROXYBUTYRATE\"                   Results from last 7 days   Lab Units 05/30/25  1453 05/30/25  1234   HS TNI 0HR ng/L  --  20   HS TNI 2HR ng/L 19  --    HSTNI D2 ng/L -1  --          Results from last 7 days   Lab Units 05/30/25  1234   PROTIME seconds 14.1   INR  1.04   PTT seconds 32                                                                                                               Past Medical History[1]  Present on Admission:   Paroxysmal atrial fibrillation (HCC)   GI bleed   Hypothyroidism   Orthostatic hypotension      Admitting Diagnosis: Weakness " [R53.1]  General weakness [R53.1]  History of upper gastrointestinal bleeding [Z87.19]  Internal carotid artery stenosis, right [I65.21]  Age/Sex: 89 y.o. male    Network Utilization Review Department  ATTENTION: Please call with any questions or concerns to [phone] and carefully listen to the prompts so that you are directed to the right person. All voicemails are confidential.   For Discharge needs, contact Care Management DC Support Team at [phone] opt. 2  Send all requests for admission clinical reviews, approved or denied determinations and any other requests to dedicated fax number below belonging to the campus where the patient is receiving treatment. List of dedicated fax numbers for the Facilities:  FACILITY NAME UR FAX NUMBER   ADMISSION DENIALS (Administrative/Medical Necessity) [phone]   DISCHARGE SUPPORT TEAM (NETWORK) [phone]   PARENT CHILD HEALTH (Maternity/NICU/Pediatrics) [phone]   [facility] [phone]   [facility] [phone]   [facility] [phone]   [facility] [phone]   Critical access hospital [phone]   [facility] [phone]   [facility] [phone]   [facility] [phone]   [facility] [phone]   [facility] [phone]   [facility] [phone]   [facility] [phone]              [1]   Past Medical History:  Diagnosis Date    BPH (benign prostatic hyperplasia)     GERD (gastroesophageal reflux disease)     Hyperlipidemia     Hypertension     Hypothyroidism     Kidney stone     Peripheral vascular disease (HCC)     Walker as ambulation aid

## 2025-05-31 NOTE — PLAN OF CARE
Problem: Potential for Falls  Goal: Patient will remain free of falls  Description: INTERVENTIONS:  - Educate patient/family on patient safety including physical limitations  - Instruct patient to call for assistance with activity   - Consider consulting OT/PT to assist with strengthening/mobility based on AM PAC & JH-HLM score  - Consult OT/PT to assist with strengthening/mobility   - Keep Call bell within reach  - Keep bed low and locked with side rails adjusted as appropriate  - Keep care items and personal belongings within reach  - Initiate and maintain comfort rounds  - Make Fall Risk Sign visible to staff  - Offer Toileting every 2 Hours, in advance of need  - Initiate/Maintain bed alarm  - Obtain necessary fall risk management equipment: yellow socks  - Apply yellow socks and bracelet for high fall risk patients  - Consider moving patient to room near nurses station  Outcome: Progressing     Problem: SAFETY ADULT  Goal: Patient will remain free of falls  Description: INTERVENTIONS:  - Educate patient/family on patient safety including physical limitations  - Instruct patient to call for assistance with activity   - Consider consulting OT/PT to assist with strengthening/mobility based on AM PAC & JH-HLM score  - Consult OT/PT to assist with strengthening/mobility   - Keep Call bell within reach  - Keep bed low and locked with side rails adjusted as appropriate  - Keep care items and personal belongings within reach  - Initiate and maintain comfort rounds  - Make Fall Risk Sign visible to staff  - Offer Toileting every 2 Hours, in advance of need  - Initiate/Maintain bed alarm  - Obtain necessary fall risk management equipment: yellow socks  - Apply yellow socks and bracelet for high fall risk patients  - Consider moving patient to room near nurses station  Outcome: Progressing  Goal: Maintain or return to baseline ADL function  Description: INTERVENTIONS:  -  Assess patient's ability to carry out ADLs;  assess patient's baseline for ADL function and identify physical deficits which impact ability to perform ADLs (bathing, care of mouth/teeth, toileting, grooming, dressing, etc.)  - Assess/evaluate cause of self-care deficits   - Assess range of motion  - Assess patient's mobility; develop plan if impaired  - Assess patient's need for assistive devices and provide as appropriate  - Encourage maximum independence but intervene and supervise when necessary  - Involve family in performance of ADLs  - Assess for home care needs following discharge   - Consider OT consult to assist with ADL evaluation and planning for discharge  - Provide patient education as appropriate  - Monitor functional capacity and physical performance, use of AM PAC & JH-HLM   - Monitor gait, balance and fatigue with ambulation    Outcome: Progressing  Goal: Maintains/Returns to pre admission functional level  Description: INTERVENTIONS:  - Perform AM-PAC 6 Click Basic Mobility/ Daily Activity assessment daily.  - Set and communicate daily mobility goal to care team and patient/family/caregiver.   - Collaborate with rehabilitation services on mobility goals if consulted  - Perform Range of Motion 3 times a day.  - Reposition patient every 2 hours.  - Dangle patient 3 times a day  - Stand patient 3 times a day  - Ambulate patient 3 times a day  - Out of bed to chair 3 times a day   - Out of bed for meals 3 times a day  - Out of bed for toileting  - Record patient progress and toleration of activity level   Outcome: Progressing     Problem: DISCHARGE PLANNING  Goal: Discharge to home or other facility with appropriate resources  Description: INTERVENTIONS:  - Identify barriers to discharge w/patient and caregiver  - Arrange for needed discharge resources and transportation as appropriate  - Identify discharge learning needs (meds, wound care, etc.)  - Arrange for interpretive services to assist at discharge as needed  - Refer to Case Management  Department for coordinating discharge planning if the patient needs post-hospital services based on physician/advanced practitioner order or complex needs related to functional status, cognitive ability, or social support system  Outcome: Progressing     Problem: Knowledge Deficit  Goal: Patient/family/caregiver demonstrates understanding of disease process, treatment plan, medications, and discharge instructions  Description: Complete learning assessment and assess knowledge base.  Interventions:  - Provide teaching at level of understanding  - Provide teaching via preferred learning methods  Outcome: Progressing     Problem: MUSCULOSKELETAL - ADULT  Goal: Maintain or return mobility to safest level of function  Description: INTERVENTIONS:  - Assess patient's ability to carry out ADLs; assess patient's baseline for ADL function and identify physical deficits which impact ability to perform ADLs (bathing, care of mouth/teeth, toileting, grooming, dressing, etc.)  - Assess/evaluate cause of self-care deficits   - Assess range of motion  - Assess patient's mobility  - Assess patient's need for assistive devices and provide as appropriate  - Encourage maximum independence but intervene and supervise when necessary  - Involve family in performance of ADLs  - Assess for home care needs following discharge   - Consider OT consult to assist with ADL evaluation and planning for discharge  - Provide patient education as appropriate  Outcome: Progressing

## 2025-05-31 NOTE — ASSESSMENT & PLAN NOTE
- Previously on Eliquis 2.5 mg BID  - Held since 5/23/2025 2/2 admission at [facility] for GI bleed   - Echo ordered, telemetry ordered

## 2025-05-31 NOTE — PROGRESS NOTES
Progress Note - Surgery-General   Name: Masoud Steward 89 y.o. male I MRN: [id number]  Unit/Bed#: -01 I Date of Admission: 5/30/2025   Date of Service: 5/31/2025 I Hospital Day: 0    Assessment & Plan  Transient weakness of left lower extremity  89 year old M with PMH on Afib (No AC), hx recent admission for GI bleed, HTN, HLD, peripheral vascular disease, peripheral neuropathy  c/o transient sudden-onset LLE weakness, resolved after several minutes    previously on Eliquis, held since 5/23/2025 2/2 GI bleed     - CT stroke alert (head/neck) (5/30):   IMPRESSION:  Severe stenosis in the proximal right internal carotid artery (70-99% stenosis).  No significant stenosis of the cervical left carotid or vertebral arteries  No significant intracranial stenosis, large vessel occlusion or aneurysm.    - Neurology suspects TIA/stroke 2/2 severe right ICA stenosis vs. Afib without coagulation   - MRI Brain ordered, pending   - Echo ordered, pending     AVSS on RA   WBC 5.56, Hgb 11.2   CMP unremarkable, Cr 1.11   INR 1.04, PTT 32   Chol 134  Trig 89  HDL 43  LDL 73    Patient is stable. Symptoms appear to have resolved. No recurrence of symptoms..    Plan:  Follow MRI brain to confirm CVA, follow echo   Ordered carotid duplex to better assess extent of stenosis and inform clinical decision making, for now recommend optimized medical therapy   Continue statin  Recommend ASA, DAPT in setting of carotid stenosis, defer to GI consult for guidance with starting patient on these in light of recent GI bleed   Frequent neurovascular checks   Appreciate Neurology recommendations  Other care per primary team     Paroxysmal atrial fibrillation (HCC)  - Previously on Eliquis 2.5 mg BID  - Held since 5/23/2025 2/2 admission at [facility] for GI bleed   - Echo ordered, telemetry ordered    GI bleed  - Recent hospitalization for GI bleed / hematemesis   - EGD showed spurting vessel at EGJ, s/p endo clip, anticoagulation held  - GI  consulted, recommendations appreciated   Internal carotid artery stenosis, right  - Historic VAS Carotid Duplex bilateral 7/2022: R ICA stenosis 50-69%, hemodynamically significant. L ICA <50% stenosis. Bilateral vertebral arteries with normally directed antegrade flow.   - CT stroke alert 5/30: 70-99% stenosis of right ICA.   - Order Carotid duplex   - See above for plan     Orthostatic hypotension          Subjective   I feel fine today.     Objective :  Temp:  [97.6 °F (36.4 °C)-98.1 °F (36.7 °C)] 97.7 °F (36.5 °C)  HR:  [52-70] 56  BP: (120-143)/(54-79) 124/64  Resp:  [15-20] 17  SpO2:  [96 %-100 %] 96 %  O2 Device: None (Room air)    I/O         05/29 0701  05/30 0700 05/30 0701  05/31 0700 05/31 0701  06/01 0700    P.O.  680     Total Intake(mL/kg)  680 (7.8)     Urine (mL/kg/hr)  1010 225 (1.4)    Total Output  1010 225    Net  -330 -225                   Physical Exam  Vitals and nursing note reviewed.   Constitutional:       General: He is not in acute distress.     Appearance: He is well-developed.   HENT:      Head: Normocephalic and atraumatic.     Eyes:      Conjunctiva/sclera: Conjunctivae normal.       Cardiovascular:      Rate and Rhythm: Normal rate and regular rhythm.      Heart sounds: No murmur heard.  Pulmonary:      Effort: Pulmonary effort is normal. No respiratory distress.      Breath sounds: Normal breath sounds.   Abdominal:      Palpations: Abdomen is soft.      Tenderness: There is no abdominal tenderness.     Musculoskeletal:         General: No swelling.      Cervical back: Neck supple.     Skin:     General: Skin is warm and dry.      Capillary Refill: Capillary refill takes less than 2 seconds.     Neurological:      General: No focal deficit present.      Mental Status: He is alert and oriented to person, place, and time.      Cranial Nerves: No cranial nerve deficit.      Motor: No weakness.     Psychiatric:         Mood and Affect: Mood normal.           Lab Results: I have  reviewed the following results:  Recent Labs     05/30/25  1234 05/30/25  1453 05/30/25  1954   WBC 5.56  --   --    HGB 11.2*  --  11.8*   HCT 34.1*  --  36.5     --   --    SODIUM 140  --   --    K 4.5  --   --      --   --    CO2 29  --   --    BUN 20  --   --    CREATININE 1.11  --   --    GLUC 108  --   --    AST 30  --   --    ALT 35  --   --    ALB 3.5  --   --    TBILI 0.44  --   --    ALKPHOS 42  --   --    PTT 32  --   --    INR 1.04  --   --    HSTNI0 20  --   --    HSTNI2  --  19  --        Imaging Results Review: No pertinent imaging studies reviewed.  Other Study Results Review: No additional pertinent studies reviewed.    VTE Pharmacologic Prophylaxis: Sequential compression device (Venodyne)   VTE Mechanical Prophylaxis: sequential compression device

## 2025-05-31 NOTE — PROGRESS NOTES
"Progress Note - Hospitalist   Name: Masoud Steward 89 y.o. male I MRN: [id number]  Unit/Bed#: [facility]-01 I Date of Admission: 5/30/2025   Date of Service: 5/31/2025 I Hospital Day: 0    Assessment & Plan  Transient weakness of left lower extremity  Presenting with transient left lower extremity weakness  CT head, CTA head/neck with severe stenosis in the proximal right ICA  Neurology consulted, recommended stroke pathway   MRI brain: no acute infarct. Mild chronic microangiopathic change   Hemoglobin A1c: 5.6  Lipid panel WNL  Vascular surgery consulted  Carotid US ordered   Continue statin --simvastatin to atorvastatin  Recommending DAPT however deferring to GI given high risk bleed  GI consulted for risk benefit ratio with anticoagulation  Patient was resumed on Plavix yesterday 5/30.  Per conversation with GI 5/31 patient is high risk for recurrent bleed however given concern for stroke and severe stenosis, ok to continue on antiplatelet w/ plavix. If neuro recommending DAPT, ok with GI with close monitoring  Telemetry, ECHO pending   PT/OT/speech  Neurochecks  Hypothyroidism  Continue levothyroxine  Paroxysmal atrial fibrillation (HCC)  Follows with cardiology  On Eliquis previously, held after recent GI bleed at [facility] -- eliquis remains on hold   Monitor on telemetry  GI bleed  Recent hospitalization at [facility] (5/23- 5/27) for hematemesis  Emergent EGD at that time revealed \"spurting vessel at EGJ s/p Endo Clip x 1 and p.o. diet with successful hemostasis \".  Patient had EGD in July 2024 at [facility]- also noted gastric erosions at that time  GI consulted, appreciate recs   Continue PPI BID. Resume home regimen omeprazole 20mg qam at discharge   Internal carotid artery stenosis, right  CTA H/N-severe stenosis in the proximal right ICA  Vascular surgery consulted, appreciate recs  Started on Plavix 5/30  Continue atorvastatin  Orthostatic hypotension  Recently started on midodrine 10 mg 3 times daily for orthostatic " hypotension, continue  Chronic constipation  Last BM 2 days ago   Start miralax, and colace    VTE Pharmacologic Prophylaxis:   Moderate Risk (Score 3-4) - Pharmacological DVT Prophylaxis Contraindicated. Sequential Compression Devices Ordered.    Mobility:   Basic Mobility Inpatient Raw Score: 21  JH-HLM Goal: 6: Walk 10 steps or more  JH-HLM Achieved: 6: Walk 10 steps or more  JH-HLM Goal achieved. Continue to encourage appropriate mobility.    Patient Centered Rounds: I performed bedside rounds with nursing staff today.   Discussions with Specialists or Other Care Team Provider: GI, vascular surgery, neuro    Education and Discussions with Family / Patient: Attempted to update  (daughter) via phone. Unable to contact.    Current Length of Stay: 0 day(s)  Current Patient Status: Inpatient   Certification Statement: The patient will continue to require additional inpatient hospital stay due to hgb monitoring, carotid US, pending safe dispo  Discharge Plan: Anticipate discharge in 24-48 hrs to home.    Code Status: Level 1 - Full Code    Subjective   Seen and examined. No acute events overnight. Reports feeling well this morning.  Denies any weakness.  Denies chest pain, shortness of breath, nausea, vomiting, diarrhea, numbness or tingling of extremities, vision changes or hearing changes.  We discussed MRI results.  We additionally discussed plan for carotid ultrasound and hemoglobin monitoring.    Objective :  Temp:  [97.6 °F (36.4 °C)-98 °F (36.7 °C)] 97.8 °F (36.6 °C)  HR:  [54-81] 61  BP: (116-167)/() 118/60  Resp:  [16-18] 18  SpO2:  [96 %-100 %] 97 %  O2 Device: None (Room air)    Body mass index is 26.04 kg/m².     Input and Output Summary (last 24 hours):     Intake/Output Summary (Last 24 hours) at 5/31/2025 1507  Last data filed at 5/31/2025 1411  Gross per 24 hour   Intake 920 ml   Output 1495 ml   Net -575 ml       Physical Exam  Constitutional:       General: He is not in acute  distress.     Appearance: He is not toxic-appearing.   HENT:      Head: Normocephalic and atraumatic.      Nose: Nose normal.      Mouth/Throat:      Mouth: Mucous membranes are moist.     Eyes:      Conjunctiva/sclera: Conjunctivae normal.       Cardiovascular:      Rate and Rhythm: Normal rate and regular rhythm.      Heart sounds: Normal heart sounds.   Pulmonary:      Effort: Pulmonary effort is normal.      Breath sounds: Normal breath sounds.   Abdominal:      General: There is no distension.      Palpations: Abdomen is soft.      Tenderness: There is no abdominal tenderness.     Musculoskeletal:      Cervical back: Normal range of motion.      Right lower leg: Edema present.      Left lower leg: Edema present.     Skin:     General: Skin is warm and dry.     Neurological:      General: No focal deficit present.     Psychiatric:         Mood and Affect: Mood normal.       Lines/Drains:    Telemetry:  Telemetry Orders (From admission, onward)               24 Hour Telemetry Monitoring  (ED Bridging Orders Panel)  Continuous x 24 Hours (Telem)        Expiring   Question:  Reason for 24 Hour Telemetry  Answer:  TIA/Suspected CVA/ Confirmed CVA                     Telemetry Reviewed: Normal Sinus Rhythm  Indication for Continued Telemetry Use: Acute CVA               Lab Results: I have reviewed the following results:   Results from last 7 days   Lab Units 05/31/25  1410 05/30/25  1954 05/30/25  1234   WBC Thousand/uL  --   --  5.56   HEMOGLOBIN g/dL 12.6   < > 11.2*   HEMATOCRIT % 37.4   < > 34.1*   PLATELETS Thousands/uL  --   --  205   SEGS PCT %  --   --  55   LYMPHO PCT %  --   --  29   MONO PCT %  --   --  11   EOS PCT %  --   --  3    < > = values in this interval not displayed.     Results from last 7 days   Lab Units 05/30/25  1234   SODIUM mmol/L 140   POTASSIUM mmol/L 4.5   CHLORIDE mmol/L 105   CO2 mmol/L 29   BUN mg/dL 20   CREATININE mg/dL 1.11   ANION GAP mmol/L 6   CALCIUM mg/dL 9.0   ALBUMIN g/dL  3.5   TOTAL BILIRUBIN mg/dL 0.44   ALK PHOS U/L 42   ALT U/L 35   AST U/L 30   GLUCOSE RANDOM mg/dL 108     Results from last 7 days   Lab Units 05/30/25  1234   INR  1.04     Results from last 7 days   Lab Units 05/30/25  1252   POC GLUCOSE mg/dl 93     Results from last 7 days   Lab Units 05/30/25  1234   HEMOGLOBIN A1C % 5.6           Recent Cultures (last 7 days):         Imaging Results Review: I reviewed radiology reports from this admission including: MRI brain.  Other Study Results Review: No additional pertinent studies reviewed.    Last 24 Hours Medication List:     Current Facility-Administered Medications:     acetaminophen (TYLENOL) tablet 650 mg, Q6H PRN    atorvastatin (LIPITOR) tablet 40 mg, QPM    clopidogrel (PLAVIX) tablet 75 mg, Daily    docusate sodium (COLACE) capsule 100 mg, BID    levothyroxine tablet 125 mcg, QAM    midodrine (PROAMATINE) tablet 10 mg, TID    ondansetron (ZOFRAN) injection 4 mg, Q6H PRN    pantoprazole (PROTONIX) injection 40 mg, Q12H MAX    polyethylene glycol (MIRALAX) packet 17 g, Daily    tamsulosin (FLOMAX) capsule 0.4 mg, Daily With Dinner    Administrative Statements   Today, Patient Was Seen By: Erica Wilkins PA-C    **Please Note: This note may have been constructed using a voice recognition system.**

## 2025-05-31 NOTE — ASSESSMENT & PLAN NOTE
CTA H/N-severe stenosis in the proximal right ICA  Vascular surgery consulted, appreciate recs  Started on Plavix 5/30  Continue atorvastatin

## 2025-06-01 ENCOUNTER — TELEPHONE (OUTPATIENT)
Age: OVER 89
End: 2025-06-01

## 2025-06-01 LAB
ANION GAP SERPL CALCULATED.3IONS-SCNC: 5 MMOL/L (ref 4–13)
BUN SERPL-MCNC: 18 MG/DL (ref 5–25)
CALCIUM SERPL-MCNC: 8.5 MG/DL (ref 8.4–10.2)
CHLORIDE SERPL-SCNC: 106 MMOL/L (ref 96–108)
CO2 SERPL-SCNC: 25 MMOL/L (ref 21–32)
CREAT SERPL-MCNC: 1.18 MG/DL (ref 0.6–1.3)
ERYTHROCYTE [DISTWIDTH] IN BLOOD BY AUTOMATED COUNT: 13.6 % (ref 11.6–15.1)
GFR SERPL CREATININE-BSD FRML MDRD: 53 ML/MIN/1.73SQ M
GLUCOSE SERPL-MCNC: 117 MG/DL (ref 65–140)
HCT VFR BLD AUTO: 33.3 % (ref 36.5–49.3)
HGB BLD-MCNC: 10.9 G/DL (ref 12–17)
MCH RBC QN AUTO: 35.4 PG (ref 26.8–34.3)
MCHC RBC AUTO-ENTMCNC: 32.7 G/DL (ref 31.4–37.4)
MCV RBC AUTO: 108 FL (ref 82–98)
PLATELET # BLD AUTO: 240 THOUSANDS/UL (ref 149–390)
PMV BLD AUTO: 9.2 FL (ref 8.9–12.7)
POTASSIUM SERPL-SCNC: 4.3 MMOL/L (ref 3.5–5.3)
RBC # BLD AUTO: 3.08 MILLION/UL (ref 3.88–5.62)
SODIUM SERPL-SCNC: 136 MMOL/L (ref 135–147)
WBC # BLD AUTO: 5.93 THOUSAND/UL (ref 4.31–10.16)

## 2025-06-01 PROCEDURE — 85027 COMPLETE CBC AUTOMATED: CPT

## 2025-06-01 PROCEDURE — 99232 SBSQ HOSP IP/OBS MODERATE 35: CPT

## 2025-06-01 PROCEDURE — 80048 BASIC METABOLIC PNL TOTAL CA: CPT

## 2025-06-01 RX ORDER — FUROSEMIDE 10 MG/ML
20 INJECTION INTRAMUSCULAR; INTRAVENOUS ONCE
Status: COMPLETED | OUTPATIENT
Start: 2025-06-01 | End: 2025-06-01

## 2025-06-01 RX ADMIN — FUROSEMIDE 20 MG: 10 INJECTION, SOLUTION INTRAVENOUS at 11:07

## 2025-06-01 RX ADMIN — DOCUSATE SODIUM 100 MG: 100 CAPSULE, LIQUID FILLED ORAL at 17:19

## 2025-06-01 RX ADMIN — MIDODRINE HYDROCHLORIDE 10 MG: 5 TABLET ORAL at 08:58

## 2025-06-01 RX ADMIN — MIDODRINE HYDROCHLORIDE 10 MG: 5 TABLET ORAL at 21:53

## 2025-06-01 RX ADMIN — PANTOPRAZOLE SODIUM 40 MG: 40 INJECTION, POWDER, FOR SOLUTION INTRAVENOUS at 21:53

## 2025-06-01 RX ADMIN — MIDODRINE HYDROCHLORIDE 10 MG: 5 TABLET ORAL at 17:19

## 2025-06-01 RX ADMIN — POLYETHYLENE GLYCOL 3350 17 G: 17 POWDER, FOR SOLUTION ORAL at 08:58

## 2025-06-01 RX ADMIN — LEVOTHYROXINE SODIUM 125 MCG: 100 TABLET ORAL at 06:09

## 2025-06-01 RX ADMIN — CLOPIDOGREL 75 MG: 75 TABLET ORAL at 08:58

## 2025-06-01 RX ADMIN — DOCUSATE SODIUM 100 MG: 100 CAPSULE, LIQUID FILLED ORAL at 08:58

## 2025-06-01 RX ADMIN — ATORVASTATIN CALCIUM 40 MG: 40 TABLET, FILM COATED ORAL at 17:19

## 2025-06-01 RX ADMIN — TAMSULOSIN HYDROCHLORIDE 0.4 MG: 0.4 CAPSULE ORAL at 17:19

## 2025-06-01 RX ADMIN — PANTOPRAZOLE SODIUM 40 MG: 40 INJECTION, POWDER, FOR SOLUTION INTRAVENOUS at 08:58

## 2025-06-01 NOTE — TELEPHONE ENCOUNTER
STILL ADMITTED:5/30/2025 - present (2 days)  [facility]          HFU/ [facility]/ TIA    ----- Message from Ruth Mejia PA-C sent at 6/1/2025  1:20 PM EDT -----  Regarding: Hospital followup  Masoud Steward will need follow-up in in 6 weeks with neurovascular team for TIA in 60 minute appointment. They will not require outpatient neurological testing.

## 2025-06-01 NOTE — SPEECH THERAPY NOTE
Speech Language/Pathology  Order received, chart reviewed. Pt passed nursing dysphagia screen and has been tolerating regular texture diet, thin liquids, and PO meds. Spoke w/ RN, RN denies concerns for dysphagia, aspiration, and/or speech/language deficits. Pt denies dysphagia, odynophagia, globus sensation, s/s aspiration, and/or speech/language deficits at this time. MRI negative for acute intracranial hemorrhage or evidence of recent infarction. Formal ST evaluation not indicated at this time. Please re-consult if medically necessary.

## 2025-06-01 NOTE — QUICK NOTE
MRI brain without contrast reviewed.  No acute infarct noted.  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    Cannot exclude TIA as etiology of transient left lower extremity weakness.  Etiology of TIA perhaps related to severe right ICA stenosis versus atrial fibrillation while not on anticoagulation.    Case discussed among Vascular Surgery, Gastroenterology, Internal Medicine, and Neurology.  DAPT would be preferred given his severe right ICA stenosis, but patient is at high risk of GI rebleeding.  Per GI, prefer to avoid NSAIDs.  Therefore, patient is currently on Plavix 75 mg daily monotherapy and statin.  Carotid ultrasound pending.  Patient should follow-up with his outpatient cardiology team to consider Zio patch/ILR for ongoing rhythm monitoring and determination of need for anticoagulation going forward.    No further inpatient recommendations.  Will have patient follow-up in the office.    Masoud Steward will need follow-up in in 6 weeks with neurovascular team for TIA in 60 minute appointment. They will not require outpatient neurological testing.  Message sent to schedulers.

## 2025-06-01 NOTE — ASSESSMENT & PLAN NOTE
Presenting with transient left lower extremity weakness  CT head, CTA head/neck with severe stenosis in the proximal right ICA  Neurology consulted, recommended stroke pathway   MRI brain: no acute infarct. Mild chronic microangiopathic change   Hemoglobin A1c: 5.6  Lipid panel WNL  Okay to continue monotherapy with Plavix 75 mg daily - no further inpt recs  Should have f/u outpatient to consider zio patch for rhythm monitoring and need for anticoagulation  Follow-up with neuro in 6 weeks  Vascular surgery consulted  Carotid US ordered   Continue statin --simvastatin to atorvastatin  Recommending DAPT however deferring to GI given high risk bleed  GI consulted for risk benefit ratio with anticoagulation  Patient was resumed on Plavix 5/30  ECHO pending

## 2025-06-01 NOTE — UTILIZATION REVIEW
"Continued Stay Review    Date:   6/1       Admission: Date/Time/Statement:   Admission Orders (From admission, onward)          Ordered         05/31/25 1458   INPATIENT ADMISSION  Once                                      Current Patient Class: Inpatient  Current Level of Care:  med surg    Chief Complaint   Patient presents with    Weakness - Generalized       Pt to ED via EMS. EMS reports that pt was grocery shopping when he suddenly became very weak. EMS states that pt did not fall. Pt denies any pain at this time.          Initial Presentation: 89 y.o. male presented to ED via EMS as observation for Transient weakness of left lower extremity. PMHx  A-fib not currently on AC due to recent admission to [facility] for GI bleed, hypertension, dyslipidemia, hypothyroidism, nephrolithiasis, PVD, peripheral neuropathy, presents with transient left lower extremity weakness. atient was shopping at the [employer] when his left lower extremity suddenly became weak and he was unable to bear weight as a result. He fell \"into the pies\" and was unable to ambulate due to the weakness. He estimates this weakness lasted approximately 2 to 3 minutes before resolving. Exam benign. Plan Consult GI,neurology, vascular surgery telemetry,PT/OT/ST, MRI, ECHO vs and neuro checks q 1 hr x 4 hrs q2 hrs x 8 hrs q 4 hrs x 72 hrs A+SCD and supportive care     Neurology consult:  PI: Masoud Steward is a 89 y.o. right handed male with A-fib not currently on AC due to recent admission to [facility] for GI bleed, hypertension, dyslipidemia, hypothyroidism, nephrolithiasis, PVD, peripheral neuropathy, presents with transient left lower extremity weakness.  Patient was shopping at the [employer] when his left lower extremity suddenly became weak and he was unable to bear weight as a result.  He fell \"into the pies\" and was unable to ambulate due to the weakness.  He estimates this weakness lasted approximately 2 to 3 minutes before resolving.  He denied " acute symptoms elsewhere, such as arm weakness, facial droop, speech difficulty or vision difficulty.  He denied any lightheadedness or dizziness prior to this fall.  Blood pressure on presentation 136/55.  Patient was not orthostatic.   CT head demonstrated no acute changes.  CTA head/neck demonstrated severe stenosis in the proximal right ICA.  Patient AP/AC naïve just prior to this admission.  He was previously on Eliquis 2.5 mg twice daily for secondary stroke prevention related to A-fib.     Vascular surgery:  Transient weakness of left lower extremity  89 year old M with PMH on Afib (No AC), hx recent admission for GI bleed, HTN, HLD, peripheral vascular disease, peripheral neuropathy  - c/o transient sudden-onset LLE weakness, resolved after several minutes   - previously on Eliquis, held since 5/23/2025 2/2 GI bleed   - CT stroke alert (head/neck) (5/30):   IMPRESSION:  Severe stenosis in the proximal right internal carotid artery (70-99% stenosis).  No significant stenosis of the cervical left carotid or vertebral arteries  No significant intracranial stenosis, large vessel occlusion or aneurysm.     - Neurology suspects TIA/stroke 2/2 severe right ICA stenosis vs. Afib without coagulation   - MRI Brain ordered, pending   - Echo ordered, pending     HPI:89 y.o. male initially admitted on OBS 05-30-25 @ 1458 CONVERTED TO INPATIENT ADMISSION 05-31-25 @ 1458 FOR CONTINUATION OF CARE FOR TRANSIENT WEAKNESS OF LEFT LOWER EXTERMITY.      Current Diagnosis:  Transient weakness  LLE    Assessment/Plan:   Date:   6/1  Day 3: Has surpassed a 2nd midnight with active treatments and services.  Wait  2 DE ands  carotid US.  Needs  PT/OT.   Continue all current meds.         Medications:   Scheduled Medications:  atorvastatin, 40 mg, Oral, QPM  clopidogrel, 75 mg, Oral, Daily  docusate sodium, 100 mg, Oral, BID  levothyroxine, 125 mcg, Oral, QAM  midodrine, 10 mg, Oral, TID  pantoprazole, 40 mg, Intravenous, Q12H  MAX  polyethylene glycol, 17 g, Oral, Daily  tamsulosin, 0.4 mg, Oral, Daily With Dinner      Continuous IV Infusions:     PRN Meds:  acetaminophen, 650 mg, Oral, Q6H PRN  ondansetron, 4 mg, Intravenous, Q6H PRN      Discharge Plan:   TBD    Vital Signs (last 3 days)       Date/Time Temp Pulse Resp BP MAP (mmHg) SpO2 O2 Device Patient Position - Orthostatic VS [place] Coma Scale Score Pain    06/01/25 1152 98.6 °F (37 °C) 64 18 117/55 -- 97 % -- -- -- --    06/01/25 11:08:48 -- 64 -- 117/55 76 97 % -- -- -- --    06/01/25 09:00:48 -- 64 -- 117/71 86 98 % -- -- -- --    06/01/25 0800 98.6 °F (37 °C) 64 18 117/71 -- 98 % None (Room air) -- 15 No Pain    06/01/25 04:24:04 98.6 °F (37 °C) 59 20 118/61 80 96 % -- Lying -- --    05/31/25 22:21:56 98.3 °F (36.8 °C) 60 -- 122/61 81 95 % -- -- -- --    05/31/25 1945 -- -- -- -- -- -- None (Room air) -- 15 No Pain    05/31/25 19:26:16 98.8 °F (37.1 °C) 64 -- 123/62 82 96 % -- -- -- --    05/31/25 1725 -- -- -- -- -- -- -- -- 15 --    05/31/25 17:07:12 98.5 °F (36.9 °C) 59 17 119/59 79 97 % -- -- -- --    05/31/25 1600 98.5 °F (36.9 °C) 59 17 119/59 -- 97 % -- -- -- --    05/31/25 14:38:09 97.8 °F (36.6 °C) 61 18 118/60 79 97 % None (Room air) Sitting -- --    05/31/25 1411 -- 62 -- -- -- 96 % -- -- -- --    05/31/25 13:11:04 98 °F (36.7 °C) 63 17 121/59 80 99 % None (Room air) Sitting -- --    05/31/25 1200 98 °F (36.7 °C) 63 17 121/58 -- 99 % -- -- 15 --    05/31/25 09:55:17 -- 73 -- 116/57 77 98 % -- Other (Comment) -- --    05/31/25 09:44:15 -- 71 -- 117/57 77 97 % -- Sitting - Orthostatic VS -- --    05/31/25 09:42:14 -- 81 -- 167/134 145 98 % -- Sitting - Orthostatic VS -- --    05/31/25 09:39:14 -- 66 -- 129/65 86 98 % -- Lying - Orthostatic VS -- --    05/31/25 08:08:19 97.7 °F (36.5 °C) 56 -- 124/64 84 96 % -- -- -- --    05/31/25 0800 97.7 °F (36.5 °C) 56 17 124/64 -- 96 % None (Room air) -- 15 No Pain    05/31/25 0554 -- 54 -- -- -- 97 % -- -- -- --    05/31/25  04:30:08 97.7 °F (36.5 °C) 56 17 124/66 85 98 % None (Room air) Lying -- --    05/31/25 0400 97.7 °F (36.5 °C) 56 17 124/66 -- 98 % -- -- -- --    05/31/25 00:51:16 -- 58 -- 124/65 85 98 % -- -- -- --    05/31/25 0000 97.8 °F (36.6 °C) 58 17 124/65 -- 98 % -- -- -- --    05/30/25 2300 -- -- -- -- -- -- -- -- -- No Pain    05/30/25 19:57:51 97.8 °F (36.6 °C) 70 17 120/54 76 96 % -- -- -- --    05/30/25 1955 97.8 °F (36.6 °C) 69 17 120/54 -- 98 % -- -- -- --    05/30/25 1900 -- -- -- -- -- -- None (Room air) -- 15 No Pain    05/30/25 1613 97.6 °F (36.4 °C) 55 16 136/76 -- 98 % -- -- -- --    05/30/25 1559 97.8 °F (36.6 °C) 60 17 139/79 -- 100 % -- -- -- --    05/30/25 1545 97.8 °F (36.6 °C) 59 18 141/79 -- 100 % -- -- -- --    05/30/25 1527 -- -- -- -- -- 97 % None (Room air) -- 15 No Pain    05/30/25 1524 97.7 °F (36.5 °C) 59 18 140/71 -- 97 % -- -- -- --    05/30/25 15:17:58 97.7 °F (36.5 °C) 59 18 140/71 94 97 % None (Room air) Lying -- --    05/30/25 1500 -- -- -- -- -- -- -- -- 15 --    05/30/25 1445 -- 58 20 143/72 98 99 % -- -- 15 --    05/30/25 1430 -- 57 19 143/72 98 99 % -- -- 15 --    05/30/25 1415 -- 52 15 127/61 88 97 % -- -- 15 --    05/30/25 1400 -- 55 18 132/63 91 98 % None (Room air) -- 15 No Pain    05/30/25 1345 -- 53 16 130/64 89 98 % None (Room air) -- 15 No Pain    05/30/25 1330 -- 58 18 130/62 89 100 % None (Room air) -- 15 No Pain    05/30/25 1315 -- 64 18 125/59 80 97 % -- -- 15 --    05/30/25 1301 -- 67 -- 126/60 -- -- -- Standing - Orthostatic VS -- --    05/30/25 1300 -- -- -- -- -- -- -- -- 15 --    05/30/25 1259 -- 63 -- 122/66 -- -- -- Sitting - Orthostatic VS -- --    05/30/25 1258 -- 62 -- 125/69 -- -- -- Lying - Orthostatic VS -- --    05/30/25 1245 -- 60 15 130/63 90 98 % None (Room air) -- -- --    05/30/25 1231 98.1 °F (36.7 °C) -- -- -- -- -- -- -- -- No Pain    05/30/25 1230 -- 64 18 -- -- 99 % -- -- 15 --    05/30/25 1225 -- 65 -- 136/65 93 98 % -- -- -- --          Weight (last  2 days)       Date/Time Weight    05/30/25 1302 87.1 (192.02)            Pertinent Labs/Diagnostic Results:   Radiology:  MRI brain wo contrast   Final Interpretation by Robin Colon MD (05/31 1321)      No acute infarct. Mild chronic microangiopathic change.      Workstation performed: [id number]         CT stroke alert brain   Final Interpretation by E. Alec Schoenberger, MD (05/30 1309)      No acute intracranial abnormality.      Findings were reported to Nader Padilla  via HIPAA compliant secure electronic messaging at 12:53 p.m.         Workstation performed: [id number]         CTA stroke alert (head/neck)   Final Interpretation by E. Alec Schoenberger, MD (05/30 1308)   Severe stenosis in the proximal right internal carotid artery.   No significant stenosis of the cervical left carotid or vertebral arteries   No significant intracranial stenosis, large vessel occlusion or aneurysm.               Findings were directly discussed with Nader Padilla at 1:01 p.m.      Workstation performed: [id number]         VAS carotid complete study    (Results Pending)     Cardiology:  ECG 12 lead   Final Result by Darwin Pugh MD (05/31 8678)   Normal sinus rhythm   Low voltage QRS   Poor R Wave Progression   Abnormal ECG   When compared with ECG of 21-Feb-2023 15:15,   No significant change was found   Confirmed by Darwin Pugh ([id number]) on 5/31/2025 6:22:12 PM        GI:      Results from last 7 days   Lab Units 06/01/25 0428 05/31/25  1410 05/30/25  1954 05/30/25  1234   WBC Thousand/uL 5.93  --   --  5.56   HEMOGLOBIN g/dL 10.9* 12.6 11.8* 11.2*   HEMATOCRIT % 33.3* 37.4 36.5 34.1*   PLATELETS Thousands/uL 240  --   --  205   TOTAL NEUT ABS Thousands/µL  --   --   --  3.14         Results from last 7 days   Lab Units 06/01/25 0428 05/30/25  1234 05/26/25  0919   SODIUM mmol/L 136 140 138   POTASSIUM mmol/L 4.3 4.5 4   CHLORIDE mmol/L 106 105 109   CO2 mmol/L 25 29 24   ANION GAP mmol/L 5 6 5   BUN mg/dL 18  20 28   CREATININE mg/dL 1.18 1.11 1.02   EGFR ml/min/1.73sq m 53 57 69   CALCIUM mg/dL 8.5 9.0 8.7     Results from last 7 days   Lab Units 05/30/25  1234 05/26/25  0919   AST U/L 30 19   ALT U/L 35 11   ALK PHOS U/L 42 39   TOTAL PROTEIN g/dL 6.1* 6.1*   ALBUMIN g/dL 3.5 3.5   TOTAL BILIRUBIN mg/dL 0.44 0.7     Results from last 7 days   Lab Units 05/30/25  1252   POC GLUCOSE mg/dl 93     Results from last 7 days   Lab Units 06/01/25  0428 05/30/25  1234 05/26/25  0919   GLUCOSE RANDOM mg/dL 117 108 129*         Results from last 7 days   Lab Units 05/30/25  1234   HEMOGLOBIN A1C % 5.6   EAG mg/dl 114           Results from last 7 days   Lab Units 05/30/25  1453 05/30/25  1234   HS TNI 0HR ng/L  --  20   HS TNI 2HR ng/L 19  --    HSTNI D2 ng/L -1  --          Results from last 7 days   Lab Units 05/30/25  1234   PROTIME seconds 14.1   INR  1.04   PTT seconds 32                         Network Utilization Review Department  ATTENTION: Please call with any questions or concerns to [phone] and carefully listen to the prompts so that you are directed to the right person. All voicemails are confidential.   For Discharge needs, contact Care Management DC Support Team at [phone] opt. [phone]  Send all requests for admission clinical reviews, approved or denied determinations and any other requests to dedicated fax number below belonging to the campus where the patient is receiving treatment. List of dedicated fax numbers for the Facilities:  FACILITY NAME UR FAX NUMBER   ADMISSION DENIALS (Administrative/Medical Necessity) [phone]   DISCHARGE SUPPORT TEAM (NETWORK) [phone]   PARENT CHILD HEALTH (Maternity/NICU/Pediatrics) [phone]   [facility] [phone]   [facility] [phone]   [facility] [phone]   [facility] [phone]   [facility] [phone]   [facility]  [facility] [phone]   [facility] [phone]   [facility] [phone]   [facility] [phone]   [facility] [phone]   [facility] [phone]   [facility] [phone]

## 2025-06-01 NOTE — ASSESSMENT & PLAN NOTE
Follows with cardiology  On Eliquis previously, held after recent GI bleed at [facility] -- eliquis remains on hold   Monitor on telemetry

## 2025-06-01 NOTE — PLAN OF CARE
Problem: Potential for Falls  Goal: Patient will remain free of falls  Description: INTERVENTIONS:  - Educate patient/family on patient safety including physical limitations  - Instruct patient to call for assistance with activity   - Consider consulting OT/PT to assist with strengthening/mobility based on AM PAC & JH-HLM score  - Consult OT/PT to assist with strengthening/mobility   - Keep Call bell within reach  - Keep bed low and locked with side rails adjusted as appropriate  - Keep care items and personal belongings within reach  - Initiate and maintain comfort rounds  - Make Fall Risk Sign visible to staff  - Offer Toileting every 2 Hours, in advance of need  - Initiate/Maintain bed alarm  - Obtain necessary fall risk management equipment: yellow socks  - Apply yellow socks and bracelet for high fall risk patients  - Consider moving patient to room near nurses station  Outcome: Progressing     Problem: SAFETY ADULT  Goal: Patient will remain free of falls  Description: INTERVENTIONS:  - Educate patient/family on patient safety including physical limitations  - Instruct patient to call for assistance with activity   - Consider consulting OT/PT to assist with strengthening/mobility based on AM PAC & JH-HLM score  - Consult OT/PT to assist with strengthening/mobility   - Keep Call bell within reach  - Keep bed low and locked with side rails adjusted as appropriate  - Keep care items and personal belongings within reach  - Initiate and maintain comfort rounds  - Make Fall Risk Sign visible to staff  - Offer Toileting every 2 Hours, in advance of need  - Initiate/Maintain bed alarm  - Obtain necessary fall risk management equipment: yellow socks  - Apply yellow socks and bracelet for high fall risk patients  - Consider moving patient to room near nurses station  Outcome: Progressing

## 2025-06-01 NOTE — PROGRESS NOTES
"Progress Note - Hospitalist   Name: Masoud Steward 89 y.o. male I MRN: [id number]  Unit/Bed#: [facility] I Date of Admission: 5/30/2025   Date of Service: 6/1/2025 I Hospital Day: 1    Assessment & Plan  Transient weakness of left lower extremity  Presenting with transient left lower extremity weakness  CT head, CTA head/neck with severe stenosis in the proximal right ICA  Neurology consulted, recommended stroke pathway   MRI brain: no acute infarct. Mild chronic microangiopathic change   Hemoglobin A1c: 5.6  Lipid panel WNL  Okay to continue monotherapy with Plavix 75 mg daily - no further inpt recs  Should have f/u outpatient to consider zio patch for rhythm monitoring and need for anticoagulation  Follow-up with neuro in 6 weeks  Vascular surgery consulted  Carotid US ordered   Continue statin --simvastatin to atorvastatin  Recommending DAPT however deferring to GI given high risk bleed  GI consulted for risk benefit ratio with anticoagulation  Patient was resumed on Plavix 5/30  ECHO pending   Hypothyroidism  Continue levothyroxine  Paroxysmal atrial fibrillation (HCC)  Follows with cardiology  On Eliquis previously, held after recent GI bleed at [facility] -- eliquis remains on hold   Monitor on telemetry  GI bleed  Recent hospitalization at [facility] (5/23- 5/27) for hematemesis  Emergent EGD at that time revealed \"spurting vessel at EGJ s/p Endo Clip x 1 and p.o. diet with successful hemostasis \".  Patient had EGD in July 2024 at [facility]- also noted gastric erosions at that time  GI consulted, appreciate recs   Continue PPI BID. Resume home regimen omeprazole 20mg qam at discharge   Internal carotid artery stenosis, right  CTA H/N-severe stenosis in the proximal right ICA  Vascular surgery consulted, appreciate recs  Started on Plavix 5/30  Continue atorvastatin  Orthostatic hypotension  Recently started on midodrine 10 mg 3 times daily for orthostatic hypotension, continue  Chronic constipation  Last BM 2 days ago "   Start miralax, and colace    VTE Pharmacologic Prophylaxis:   Moderate Risk (Score 3-4) - Pharmacological DVT Prophylaxis Contraindicated. Sequential Compression Devices Ordered.    Mobility:   Basic Mobility Inpatient Raw Score: 19  JH-HLM Goal: 6: Walk 10 steps or more  JH-HLM Achieved: 6: Walk 10 steps or more  JH-HLM Goal achieved. Continue to encourage appropriate mobility.    Patient Centered Rounds: I performed bedside rounds with nursing staff today.   Discussions with Specialists or Other Care Team Provider: None    Education and Discussions with Family / Patient: Updated  (daughter) at bedside.    Current Length of Stay: 1 day(s)  Current Patient Status: Inpatient   Certification Statement: The patient will continue to require additional inpatient hospital stay due to pending carotid ultrasound, and echo  Discharge Plan: Anticipate discharge tomorrow to home.    Code Status: Level 1 - Full Code    Subjective   Seen and examined.  No acute events overnight.  No complaints at this time.    Objective :  Temp:  [97.8 °F (36.6 °C)-98.8 °F (37.1 °C)] 98.6 °F (37 °C)  HR:  [59-81] 59  BP: (116-167)/() 118/61  Resp:  [17-20] 20  SpO2:  [95 %-99 %] 96 %  O2 Device: None (Room air)    Body mass index is 26.04 kg/m².     Input and Output Summary (last 24 hours):     Intake/Output Summary (Last 24 hours) at 6/1/2025 0842  Last data filed at 6/1/2025 0615  Gross per 24 hour   Intake 720 ml   Output 1660 ml   Net -940 ml       Physical Exam  Constitutional:       General: He is not in acute distress.     Appearance: He is not toxic-appearing.   HENT:      Head: Normocephalic and atraumatic.      Nose: Nose normal.      Mouth/Throat:      Mouth: Mucous membranes are moist.     Eyes:      Conjunctiva/sclera: Conjunctivae normal.       Cardiovascular:      Rate and Rhythm: Normal rate.      Heart sounds: Normal heart sounds. No murmur heard.     No friction rub. No gallop.   Pulmonary:      Effort:  Pulmonary effort is normal.      Breath sounds: Normal breath sounds.   Abdominal:      General: There is no distension.      Palpations: Abdomen is soft.      Tenderness: There is no abdominal tenderness.     Musculoskeletal:      Cervical back: Normal range of motion.      Right lower leg: Edema present.      Left lower leg: Edema present.     Neurological:      General: No focal deficit present.     Psychiatric:         Mood and Affect: Mood normal.         Lines/Drains:        Telemetry:  Telemetry Orders (From admission, onward)               24 Hour Telemetry Monitoring  (ED Bridging Orders Panel)  Continuous x 24 Hours (Telem)        Expiring   Question:  Reason for 24 Hour Telemetry  Answer:  TIA/Suspected CVA/ Confirmed CVA                     Telemetry Reviewed: no overnight events  Indication for Continued Telemetry Use: No indication for continued use. Will discontinue.                Lab Results: I have reviewed the following results:   Results from last 7 days   Lab Units 06/01/25 0428 05/30/25 1954 05/30/25  1234   WBC Thousand/uL 5.93  --  5.56   HEMOGLOBIN g/dL 10.9*   < > 11.2*   HEMATOCRIT % 33.3*   < > 34.1*   PLATELETS Thousands/uL 240  --  205   SEGS PCT %  --   --  55   LYMPHO PCT %  --   --  29   MONO PCT %  --   --  11   EOS PCT %  --   --  3    < > = values in this interval not displayed.     Results from last 7 days   Lab Units 06/01/25 0428 05/30/25  1234   SODIUM mmol/L 136 140   POTASSIUM mmol/L 4.3 4.5   CHLORIDE mmol/L 106 105   CO2 mmol/L 25 29   BUN mg/dL 18 20   CREATININE mg/dL 1.18 1.11   ANION GAP mmol/L 5 6   CALCIUM mg/dL 8.5 9.0   ALBUMIN g/dL  --  3.5   TOTAL BILIRUBIN mg/dL  --  0.44   ALK PHOS U/L  --  42   ALT U/L  --  35   AST U/L  --  30   GLUCOSE RANDOM mg/dL 117 108     Results from last 7 days   Lab Units 05/30/25  1234   INR  1.04     Results from last 7 days   Lab Units 05/30/25  1252   POC GLUCOSE mg/dl 93     Results from last 7 days   Lab Units 05/30/25  1234    HEMOGLOBIN A1C % 5.6           Recent Cultures (last 7 days):         Imaging Results Review: No pertinent imaging studies reviewed.  Other Study Results Review: No additional pertinent studies reviewed.    Last 24 Hours Medication List:     Current Facility-Administered Medications:     acetaminophen (TYLENOL) tablet 650 mg, Q6H PRN    atorvastatin (LIPITOR) tablet 40 mg, QPM    clopidogrel (PLAVIX) tablet 75 mg, Daily    docusate sodium (COLACE) capsule 100 mg, BID    levothyroxine tablet 125 mcg, QAM    midodrine (PROAMATINE) tablet 10 mg, TID    ondansetron (ZOFRAN) injection 4 mg, Q6H PRN    pantoprazole (PROTONIX) injection 40 mg, Q12H MAX    polyethylene glycol (MIRALAX) packet 17 g, Daily    tamsulosin (FLOMAX) capsule 0.4 mg, Daily With Dinner    Administrative Statements   Today, Patient Was Seen By: Erica Wilkins PA-C    **Please Note: This note may have been constructed using a voice recognition system.**

## 2025-06-01 NOTE — ASSESSMENT & PLAN NOTE
"Recent hospitalization at [facility] (5/23- 5/27) for hematemesis  Emergent EGD at that time revealed \"spurting vessel at EGJ s/p Endo Clip x 1 and p.o. diet with successful hemostasis \".  Patient had EGD in July 2024 at [facility]- also noted gastric erosions at that time  GI consulted, appreciate recs   Continue PPI BID. Resume home regimen omeprazole 20mg qam at discharge   "

## 2025-06-02 ENCOUNTER — APPOINTMENT (INPATIENT)
Dept: NON INVASIVE DIAGNOSTICS | Facility: HOSPITAL | Age: OVER 89
End: 2025-06-02
Payer: COMMERCIAL

## 2025-06-02 LAB
ANION GAP SERPL CALCULATED.3IONS-SCNC: 7 MMOL/L (ref 4–13)
AORTIC ROOT: 4 CM
AORTIC VALVE MEAN VELOCITY: 9.3 M/S
AV AREA BY CONTINUOUS VTI: 2.7 CM2
AV AREA PEAK VELOCITY: 2.7 CM2
AV LVOT MEAN GRADIENT: 1 MMHG
AV LVOT PEAK GRADIENT: 4 MMHG
AV MEAN PRESS GRAD SYS DOP V1V2: 4 MMHG
AV ORIFICE AREA US: 2.68 CM2
AV PEAK GRADIENT: 9 MMHG
AV VELOCITY RATIO: 0.65
BSA FOR ECHO PROCEDURE: 2.09 M2
BUN SERPL-MCNC: 20 MG/DL (ref 5–25)
CALCIUM SERPL-MCNC: 8.7 MG/DL (ref 8.4–10.2)
CHLORIDE SERPL-SCNC: 103 MMOL/L (ref 96–108)
CO2 SERPL-SCNC: 27 MMOL/L (ref 21–32)
CREAT SERPL-MCNC: 1.15 MG/DL (ref 0.6–1.3)
DOP CALC AO VTI: 31.3 CM
DOP CALC LVOT AREA: 4.15
DOP CALC LVOT DIAMETER: 2.3 CM
DOP CALC LVOT PEAK VEL VTI: 20.2 CM
DOP CALC LVOT PEAK VEL: 0.95 M/S
DOP CALC LVOT STROKE INDEX: 40.2 ML/M2
DOP CALC LVOT STROKE VOLUME: 83.88
E WAVE DECELERATION TIME: 268 MS
E/A RATIO: 0.78
ERYTHROCYTE [DISTWIDTH] IN BLOOD BY AUTOMATED COUNT: 13.8 % (ref 11.6–15.1)
FRACTIONAL SHORTENING: 35 (ref 28–44)
GFR SERPL CREATININE-BSD FRML MDRD: 55 ML/MIN/1.73SQ M
GLUCOSE SERPL-MCNC: 108 MG/DL (ref 65–140)
HCT VFR BLD AUTO: 35.8 % (ref 36.5–49.3)
HGB BLD-MCNC: 11.7 G/DL (ref 12–17)
INTERVENTRICULAR SEPTUM IN DIASTOLE (PARASTERNAL SHORT AXIS VIEW): 1.4 CM
INTERVENTRICULAR SEPTUM: 1.4 CM (ref 0.6–1.1)
LA/AORTA RATIO 2D: 0.8
LAAS-AP2: 15.9 CM2
LAAS-AP4: 14.1 CM2
LEFT ATRIUM SIZE: 3.2 CM
LEFT ATRIUM VOLUME (MOD BIPLANE): 40 ML
LEFT ATRIUM VOLUME INDEX (MOD BIPLANE): 19.1 ML/M2
LEFT INTERNAL DIMENSION IN SYSTOLE: 2.4 CM (ref 2.1–4)
LEFT VENTRICLE DIASTOLIC VOLUME (MOD BIPLANE): 98 ML
LEFT VENTRICLE DIASTOLIC VOLUME INDEX (MOD BIPLANE): 46.9 ML/M2
LEFT VENTRICLE SYSTOLIC VOLUME (MOD BIPLANE): 52 ML
LEFT VENTRICLE SYSTOLIC VOLUME INDEX (MOD BIPLANE): 24.9 ML/M2
LEFT VENTRICULAR INTERNAL DIMENSION IN DIASTOLE: 3.7 CM (ref 3.5–6)
LEFT VENTRICULAR POSTERIOR WALL IN END DIASTOLE: 1.2 CM
LEFT VENTRICULAR STROKE VOLUME: 36 ML
LV EF BIPLANE MOD: 47 %
LV EF US.2D.A4C+ESTIMATED: 53 %
LVSV (TEICH): 36 ML
MCH RBC QN AUTO: 35.3 PG (ref 26.8–34.3)
MCHC RBC AUTO-ENTMCNC: 32.7 G/DL (ref 31.4–37.4)
MCV RBC AUTO: 108 FL (ref 82–98)
MV E'TISSUE VEL-LAT: 6 CM/S
MV E'TISSUE VEL-SEP: 5 CM/S
MV PEAK A VEL: 0.88 M/S
MV PEAK E VEL: 69 CM/S
PLATELET # BLD AUTO: 247 THOUSANDS/UL (ref 149–390)
PMV BLD AUTO: 8.7 FL (ref 8.9–12.7)
POTASSIUM SERPL-SCNC: 4.2 MMOL/L (ref 3.5–5.3)
RBC # BLD AUTO: 3.31 MILLION/UL (ref 3.88–5.62)
RIGHT VENTRICLE ID DIMENSION: 4 CM
SL CV LV EF: 55
SL CV PED ECHO LEFT VENTRICLE DIASTOLIC VOLUME (MOD BIPLANE) 2D: 57 ML
SL CV PED ECHO LEFT VENTRICLE SYSTOLIC VOLUME (MOD BIPLANE) 2D: 21 ML
SODIUM SERPL-SCNC: 137 MMOL/L (ref 135–147)
TR MAX PG: 23 MMHG
TR PEAK VELOCITY: 2.4 M/S
TRICUSPID VALVE PEAK REGURGITATION VELOCITY: 2.39 M/S
WBC # BLD AUTO: 5.59 THOUSAND/UL (ref 4.31–10.16)

## 2025-06-02 PROCEDURE — 99232 SBSQ HOSP IP/OBS MODERATE 35: CPT | Performed by: PHYSICIAN ASSISTANT

## 2025-06-02 PROCEDURE — 85027 COMPLETE CBC AUTOMATED: CPT | Performed by: PHYSICIAN ASSISTANT

## 2025-06-02 PROCEDURE — 80048 BASIC METABOLIC PNL TOTAL CA: CPT | Performed by: PHYSICIAN ASSISTANT

## 2025-06-02 PROCEDURE — 93880 EXTRACRANIAL BILAT STUDY: CPT

## 2025-06-02 PROCEDURE — 93306 TTE W/DOPPLER COMPLETE: CPT | Performed by: INTERNAL MEDICINE

## 2025-06-02 PROCEDURE — 93306 TTE W/DOPPLER COMPLETE: CPT

## 2025-06-02 PROCEDURE — 97163 PT EVAL HIGH COMPLEX 45 MIN: CPT

## 2025-06-02 PROCEDURE — 93880 EXTRACRANIAL BILAT STUDY: CPT | Performed by: SURGERY

## 2025-06-02 RX ORDER — BISACODYL 10 MG
10 SUPPOSITORY, RECTAL RECTAL DAILY PRN
Status: DISCONTINUED | OUTPATIENT
Start: 2025-06-02 | End: 2025-06-03 | Stop reason: HOSPADM

## 2025-06-02 RX ORDER — AMOXICILLIN 250 MG
1 CAPSULE ORAL 2 TIMES DAILY
Status: DISCONTINUED | OUTPATIENT
Start: 2025-06-02 | End: 2025-06-03 | Stop reason: HOSPADM

## 2025-06-02 RX ORDER — BISACODYL 5 MG/1
10 TABLET, DELAYED RELEASE ORAL ONCE
Status: COMPLETED | OUTPATIENT
Start: 2025-06-02 | End: 2025-06-02

## 2025-06-02 RX ADMIN — PANTOPRAZOLE SODIUM 40 MG: 40 INJECTION, POWDER, FOR SOLUTION INTRAVENOUS at 08:52

## 2025-06-02 RX ADMIN — TAMSULOSIN HYDROCHLORIDE 0.4 MG: 0.4 CAPSULE ORAL at 17:27

## 2025-06-02 RX ADMIN — CLOPIDOGREL 75 MG: 75 TABLET ORAL at 08:52

## 2025-06-02 RX ADMIN — SENNOSIDES AND DOCUSATE SODIUM 1 TABLET: 50; 8.6 TABLET ORAL at 08:52

## 2025-06-02 RX ADMIN — MIDODRINE HYDROCHLORIDE 10 MG: 5 TABLET ORAL at 21:04

## 2025-06-02 RX ADMIN — POLYETHYLENE GLYCOL 3350 17 G: 17 POWDER, FOR SOLUTION ORAL at 08:52

## 2025-06-02 RX ADMIN — MIDODRINE HYDROCHLORIDE 10 MG: 5 TABLET ORAL at 17:27

## 2025-06-02 RX ADMIN — BISACODYL 10 MG: 10 SUPPOSITORY RECTAL at 17:26

## 2025-06-02 RX ADMIN — ATORVASTATIN CALCIUM 40 MG: 40 TABLET, FILM COATED ORAL at 17:27

## 2025-06-02 RX ADMIN — MIDODRINE HYDROCHLORIDE 10 MG: 5 TABLET ORAL at 08:52

## 2025-06-02 RX ADMIN — SENNOSIDES AND DOCUSATE SODIUM 1 TABLET: 50; 8.6 TABLET ORAL at 17:27

## 2025-06-02 RX ADMIN — PANTOPRAZOLE SODIUM 40 MG: 40 INJECTION, POWDER, FOR SOLUTION INTRAVENOUS at 21:04

## 2025-06-02 RX ADMIN — LEVOTHYROXINE SODIUM 125 MCG: 100 TABLET ORAL at 06:44

## 2025-06-02 RX ADMIN — BISACODYL 10 MG: 5 TABLET, COATED ORAL at 08:52

## 2025-06-02 NOTE — PROGRESS NOTES
Progress Note - Vascular surgery  Name: Masoud Steward 89 y.o. male I MRN: [id number]  Unit/Bed#: -01 I Date of Admission: 5/30/2025   Date of Service: 6/2/2025 I Hospital Day: 2    Assessment & Plan  Transient weakness of left lower extremity  89 year old M with PMH on Afib (No AC), hx recent admission for GI bleed, HTN, HLD, peripheral vascular disease, peripheral neuropathy  c/o transient sudden-onset LLE weakness, resolved after several minutes    previously on Eliquis, held since 5/23/2025 2/2 GI bleed     CT stroke alert (head/neck) (5/30):   IMPRESSION:  Severe stenosis in the proximal right internal carotid artery (70-99% stenosis).  No significant stenosis of the cervical left carotid or vertebral arteries  No significant intracranial stenosis, large vessel occlusion or aneurysm.    Carotid duplex:   RIGHT:   There is 50-69% stenosis noted in the internal carotid artery.  Plaque is heterogenous and irregular.   Vertebral artery flow is antegrade.  There is no significant subclavian artery disease.   Neurology suspects TIA 2/2 severe right ICA stenosis vs. Afib without coagulation   MRI Brain: No acute infarct. Mild chronic microangiopathic change.   Echo done    AVSS on RA   Chol 134  Trig 89  HDL 43  LDL 73    Patient is stable. Symptoms appear to have resolved. No recurrence of symptoms..    Plan:  Continue statin  Recommend ASA, DAPT in setting of carotid stenosis, defer to GI consult for guidance with starting patient on these in light of recent GI bleed, pt started on plavix, holding NSAIDs per GI recs as pt is high risk  Frequent neurovascular checks   Appreciate Neurology recommendations  Outpt f/u with vascular to discuss options. Pt is very functional 89 y/o male  Other care per primary team     Paroxysmal atrial fibrillation (HCC)  - Previously on Eliquis 2.5 mg BID  - Held since 5/23/2025 2/2 admission at [facility] for GI bleed   - Echo ordered, telemetry ordered    GI bleed  - Recent  hospitalization for GI bleed / hematemesis   - EGD showed spurting vessel at EGJ, s/p endo clip, anticoagulation held  - GI consulted, recommendations appreciated   Internal carotid artery stenosis, right  - Historic VAS Carotid Duplex bilateral 7/2022: R ICA stenosis 50-69%, hemodynamically significant. L ICA <50% stenosis. Bilateral vertebral arteries with normally directed antegrade flow.   - CT stroke alert 5/30: 70-99% stenosis of right ICA.   - Order Carotid duplex   - See above for plan     Orthostatic hypotension    Chronic constipation          Subjective   I feel well. I am ready to go home.     Objective :  Temp:  [97.4 °F (36.3 °C)-98.6 °F (37 °C)] 97.6 °F (36.4 °C)  HR:  [50-66] 66  BP: (105-127)/(54-97) 119/67  Resp:  [16-18] 17  SpO2:  [94 %-98 %] 98 %  O2 Device: None (Room air)    I/O         05/31 0701  06/01 0700 06/01 0701  06/02 0700 06/02 0701  06/03 0700    P.O. 720 720 300    Total Intake(mL/kg) 720 (8.3) 720 (8.3) 300 (3.4)    Urine (mL/kg/hr) 1885 (0.9) 2350 (1.1) 200 (0.6)    Total Output 1885 2350 200    Net -1165 -1630 +100                   Physical Exam  Vitals and nursing note reviewed.   Constitutional:       General: He is not in acute distress.     Appearance: He is well-developed.   HENT:      Head: Normocephalic and atraumatic.     Eyes:      Extraocular Movements: Extraocular movements intact.      Conjunctiva/sclera: Conjunctivae normal.       Cardiovascular:      Rate and Rhythm: Normal rate and regular rhythm.   Pulmonary:      Effort: Pulmonary effort is normal. No respiratory distress.     Musculoskeletal:         General: No swelling.      Cervical back: Neck supple.     Skin:     General: Skin is warm and dry.      Capillary Refill: Capillary refill takes less than 2 seconds.     Neurological:      General: No focal deficit present.      Mental Status: He is alert.      Cranial Nerves: No cranial nerve deficit.     Psychiatric:         Mood and Affect: Mood normal.            Lab Results: I have reviewed the following results:  Recent Labs     05/30/25  1234 05/30/25  1453 05/30/25  1954 06/02/25  0807   WBC 5.56  --    < > 5.59   HGB 11.2*  --    < > 11.7*   HCT 34.1*  --    < > 35.8*     --    < > 247   SODIUM 140  --    < > 137   K 4.5  --    < > 4.2     --    < > 103   CO2 29  --    < > 27   BUN 20  --    < > 20   CREATININE 1.11  --    < > 1.15   GLUC 108  --    < > 108   AST 30  --   --   --    ALT 35  --   --   --    ALB 3.5  --   --   --    TBILI 0.44  --   --   --    ALKPHOS 42  --   --   --    PTT 32  --   --   --    INR 1.04  --   --   --    HSTNI0 20  --   --   --    HSTNI2  --  19  --   --     < > = values in this interval not displayed.       Imaging Results Review: I reviewed radiology reports from this admission including: Ultrasound(s).  Other Study Results Review: No additional pertinent studies reviewed.    VTE Pharmacologic Prophylaxis: Sequential compression device (Venodyne)   VTE Mechanical Prophylaxis: sequential compression device

## 2025-06-02 NOTE — ASSESSMENT & PLAN NOTE
Last BM 4 days ago   C/w MiraLAX; d/c colace, added Senna-S, Dulcolax PO x 1 dose, & Dulcolax suppository q day PRN

## 2025-06-02 NOTE — ASSESSMENT & PLAN NOTE
"Recent hospitalization at [facility] (5/23- 5/27) for hematemesis  Emergent EGD at that time revealed \"spurting vessel at EGJ s/p Endo Clip x 1 and p.o. diet with successful hemostasis \".  Patient had EGD in July 2024 at [facility]- also noted gastric erosions at that time  GI consulted, appreciate recs   C/w PPI BID, resume omeprazole on d/c      Recent Labs     05/31/25  1410 06/01/25  0428 06/02/25  0807   HGB 12.6 10.9* 11.7*     "

## 2025-06-02 NOTE — CASE MANAGEMENT
Case Management Discharge Planning Note    Patient name Masoud Steward  Location /-01 MRN [id number]  : 1934 Date 2025       Current Admission Date: 2025  Current Admission Diagnosis:Transient weakness of left lower extremity   Patient Active Problem List    Diagnosis Date Noted    Chronic constipation 2025    Transient weakness of left lower extremity 2025    Internal carotid artery stenosis, right 2025    GI bleed 2025    Orthostatic hypotension 2024    Paroxysmal atrial fibrillation (HCC) 2023    BPH (benign prostatic hyperplasia)     Peripheral vascular disease (HCC)     Preoperative clearance 2022    Drug-induced constipation 2022    Cognitive decline 10/26/2022    Hyponatremia 10/20/2022    Status post fall 10/19/2022    Urinary retention 10/19/2022    History of fracture of right hip 10/19/2022    Lumbar compression fracture (HCC) 10/19/2022    Abnormal urinalysis 10/18/2022    Disorientation 10/18/2022    Hydronephrosis with urinary obstruction due to renal calculus 10/18/2022    Essential (primary) hypertension 10/08/2022    Gastro-esophageal reflux disease without esophagitis 10/08/2022    Hyperlipidemia 10/08/2022    Polyneuropathy, peripheral sensorimotor axonal     Hypothyroidism 2007      LOS (days): 2  Geometric Mean LOS (GMLOS) (days):   Days to GMLOS:     OBJECTIVE:  Risk of Unplanned Readmission Score: 11.12         Current admission status: Inpatient   Preferred Pharmacy:   [id number] - [place] - [address]  [address]  [place] [address]  Phone: [phone] Fax: [phone]    Primary Care Provider: Ishaan Enamorado MD    Primary Insurance: [employer]  Secondary Insurance:     DISCHARGE DETAILS:                                                                                        IMM Given (Date):: 25  IMM Given to:: Patient   IMM reviewed with pt at bedside.  Pt aware of right to appeal hospital discharge.

## 2025-06-02 NOTE — PROGRESS NOTES
"Progress Note - Hospitalist   Name: Masoud Steward 89 y.o. male I MRN: [id number]  Unit/Bed#: MS Mel I Date of Admission: 5/30/2025   Date of Service: 6/2/2025 I Hospital Day: 2    Assessment & Plan  Transient weakness of left lower extremity  Presenting w/ transient LLE weakness  CT head, CTA head/neck w/ severe stenosis in the proximal right ICA  Carotid duplex/vascular surgery consultation as below  MRI brain: no acute infarct w/ mild chronic microangiopathic change   ECHO: EF 55%, trace MR, mild TR  A1c 5.6; LDL 73  Neurology consulted, recommended stroke pathway   Outpt f/u in 6 wks   C/w Plavix 75 mg q day & statin   Cardiology f/u for Zio patch   Internal carotid artery stenosis, right  CTA head/neck w/ severe stenosis in the proximal right internal carotid artery (70-99% stenosis)   Carotid duplex: R - 50-69% stenosis noted in the internal carotid artery; plaque is heterogenous and irregular   Vascular surgery consulted, appreciate recs  Recommended the addition of Aspirin but deferred to GI w/ high risk of rebleeding   C/w statin   C/w Plavix added on 05/30  At this time, will defer addition of aspirin w/ high risk of rebleeding   Outpt f/u advised   GI bleed  Recent hospitalization at [facility] (5/23- 5/27) for hematemesis  Emergent EGD at that time revealed \"spurting vessel at EGJ s/p Endo Clip x 1 and p.o. diet with successful hemostasis \".  Patient had EGD in July 2024 at [facility]- also noted gastric erosions at that time  GI consulted, appreciate recs   C/w PPI BID, resume omeprazole on d/c      Recent Labs     05/31/25  1410 06/01/25  0428 06/02/25  0807   HGB 12.6 10.9* 11.7*     Paroxysmal atrial fibrillation (HCC)  Known to cardiology  On Eliquis previously, d/c'd after recent GI bleed at [facility]  Rate controlled   Chronic constipation  Last BM 4 days ago   C/w MiraLAX; d/c colace, added Senna-S, Dulcolax PO x 1 dose, & Dulcolax suppository q day PRN   Orthostatic hypotension  H/o   C/w recently added " midodrine 10 mg TID   Hypothyroidism  C/w levothyroxine  TSH 10.8 & T4 0.63 in 05/2025 - outpt med titration     VTE Pharmacologic Prophylaxis:   Moderate Risk (Score 3-4) - Pharmacological DVT Prophylaxis Contraindicated. Sequential Compression Devices Ordered.Recent GI bleed.     Mobility:   Basic Mobility Inpatient Raw Score: 19  JH-HLM Goal: 6: Walk 10 steps or more  JH-HLM Achieved: 6: Walk 10 steps or more  JH-HLM Goal achieved. Continue to encourage appropriate mobility.    Patient Centered Rounds: I performed bedside rounds with nursing staff today.   Discussions with Specialists or Other Care Team Provider: Plan of care reviewed with neurology, vascular surgery, GI, case management, nursing    Education and Discussions with Family / Patient: Updated  (daughter) via phone.    Current Length of Stay: 2 day(s)  Current Patient Status: Inpatient   Certification Statement: The patient will continue to require additional inpatient hospital stay due to constipation, safe dispo planning  Discharge Plan: Anticipate discharge tomorrow to home with home services.    Code Status: Level 1 - Full Code    Subjective   Patient currently endorses no focal deficit.  He is without extremity weakness, dysarthria, dysphagia, word finding issues, headache, visual disturbance, chest pain, shortness of breath, nausea, vomiting.  He has not had a bowel movement since 05/29.  He is passing gas intermittently.  No abdominal pain.    Objective :  Temp:  [97.4 °F (36.3 °C)-98.6 °F (37 °C)] 97.6 °F (36.4 °C)  HR:  [50-66] 66  BP: (105-127)/(54-97) 119/67  Resp:  [16-18] 17  SpO2:  [94 %-98 %] 98 %  O2 Device: None (Room air)    Body mass index is 26.04 kg/m².     Input and Output Summary (last 24 hours):     Intake/Output Summary (Last 24 hours) at 6/2/2025 0910  Last data filed at 6/2/2025 0843  Gross per 24 hour   Intake 1020 ml   Output 2550 ml   Net -1530 ml       Physical Exam  Constitutional:       Appearance: He is  normal weight.      Comments: Somewhat frail, pleasant   HENT:      Head: Normocephalic.      Right Ear: External ear normal.      Left Ear: External ear normal.      Nose: Nose normal.      Mouth/Throat:      Mouth: Mucous membranes are moist.      Pharynx: Oropharynx is clear.     Eyes:      Extraocular Movements: Extraocular movements intact.      Conjunctiva/sclera: Conjunctivae normal.       Cardiovascular:      Rate and Rhythm: Normal rate and regular rhythm.      Pulses: Normal pulses.      Heart sounds: Normal heart sounds.   Pulmonary:      Effort: Pulmonary effort is normal.      Breath sounds: Normal breath sounds.   Abdominal:      General: Abdomen is flat. There is no distension.      Palpations: Abdomen is soft.      Tenderness: There is no abdominal tenderness. There is no guarding or rebound.     Musculoskeletal:         General: Swelling (Trace lower extremity edema in shins; more pronounced bilateral edema in feet) present. Normal range of motion.      Cervical back: Normal range of motion.     Skin:     General: Skin is warm and dry.      Coloration: Skin is not jaundiced.      Findings: No bruising or lesion.     Neurological:      General: No focal deficit present.      Mental Status: He is alert and oriented to person, place, and time. Mental status is at baseline.      Motor: No weakness.     Psychiatric:         Mood and Affect: Mood normal.         Behavior: Behavior normal.             Lab Results: I have reviewed the following results:   Results from last 7 days   Lab Units 06/02/25  0807 05/30/25 1954 05/30/25  1234   WBC Thousand/uL 5.59   < > 5.56   HEMOGLOBIN g/dL 11.7*   < > 11.2*   HEMATOCRIT % 35.8*   < > 34.1*   PLATELETS Thousands/uL 247   < > 205   SEGS PCT %  --   --  55   LYMPHO PCT %  --   --  29   MONO PCT %  --   --  11   EOS PCT %  --   --  3    < > = values in this interval not displayed.     Results from last 7 days   Lab Units 06/02/25  0807 06/01/25  0428  05/30/25  1234   SODIUM mmol/L 137   < > 140   POTASSIUM mmol/L 4.2   < > 4.5   CHLORIDE mmol/L 103   < > 105   CO2 mmol/L 27   < > 29   BUN mg/dL 20   < > 20   CREATININE mg/dL 1.15   < > 1.11   ANION GAP mmol/L 7   < > 6   CALCIUM mg/dL 8.7   < > 9.0   ALBUMIN g/dL  --   --  3.5   TOTAL BILIRUBIN mg/dL  --   --  0.44   ALK PHOS U/L  --   --  42   ALT U/L  --   --  35   AST U/L  --   --  30   GLUCOSE RANDOM mg/dL 108   < > 108    < > = values in this interval not displayed.     Results from last 7 days   Lab Units 05/30/25  1234   INR  1.04     Results from last 7 days   Lab Units 05/30/25  1252   POC GLUCOSE mg/dl 93     Results from last 7 days   Lab Units 05/30/25  1234   HEMOGLOBIN A1C % 5.6           Recent Cultures (last 7 days):         Imaging Results Review: I reviewed radiology reports from this admission including: CT head, Ultrasound(s), and Echocardiogram.  Other Study Results Review: EKG was reviewed.     Last 24 Hours Medication List:     Current Facility-Administered Medications:     acetaminophen (TYLENOL) tablet 650 mg, Q6H PRN    atorvastatin (LIPITOR) tablet 40 mg, QPM    bisacodyl (DULCOLAX) rectal suppository 10 mg, Daily PRN    clopidogrel (PLAVIX) tablet 75 mg, Daily    levothyroxine tablet 125 mcg, QAM    midodrine (PROAMATINE) tablet 10 mg, TID    ondansetron (ZOFRAN) injection 4 mg, Q6H PRN    pantoprazole (PROTONIX) injection 40 mg, Q12H MAX    polyethylene glycol (MIRALAX) packet 17 g, Daily    senna-docusate sodium (SENOKOT S) 8.6-50 mg per tablet 1 tablet, BID    tamsulosin (FLOMAX) capsule 0.4 mg, Daily With Dinner    Administrative Statements   Today, Patient Was Seen By: Yajaira Ronquillo PA-C  I have spent a total time of 50 minutes in caring for this patient on the day of the visit/encounter including Diagnostic results, Prognosis, Risks and benefits of tx options, Instructions for management, Patient and family education, Impressions, Counseling / Coordination of care, Documenting  in the medical record, Reviewing/placing orders in the medical record (including tests, medications, and/or procedures), Obtaining or reviewing history  , and Communicating with other healthcare professionals .    **Please Note: This note may have been constructed using a voice recognition system.**

## 2025-06-02 NOTE — ASSESSMENT & PLAN NOTE
CTA head/neck w/ severe stenosis in the proximal right internal carotid artery (70-99% stenosis)   Carotid duplex: R - 50-69% stenosis noted in the internal carotid artery; plaque is heterogenous and irregular   Vascular surgery consulted, appreciate recs  Recommended the addition of Aspirin but deferred to GI w/ high risk of rebleeding   C/w statin   C/w Plavix added on 05/30  At this time, will defer addition of aspirin w/ high risk of rebleeding   Outpt f/u advised

## 2025-06-02 NOTE — PLAN OF CARE
Problem: Potential for Falls  Goal: Patient will remain free of falls  Description: INTERVENTIONS:  - Educate patient/family on patient safety including physical limitations  - Instruct patient to call for assistance with activity   - Consider consulting OT/PT to assist with strengthening/mobility based on AM PAC & JH-HLM score  - Consult OT/PT to assist with strengthening/mobility   - Keep Call bell within reach  - Keep bed low and locked with side rails adjusted as appropriate  - Keep care items and personal belongings within reach  - Initiate and maintain comfort rounds  - Make Fall Risk Sign visible to staff  - Offer Toileting every 2 Hours, in advance of need  - Initiate/Maintain bed alarm  - Obtain necessary fall risk management equipment: yellow socks  - Apply yellow socks and bracelet for high fall risk patients  - Consider moving patient to room near nurses station  Outcome: Progressing     Problem: SAFETY ADULT  Goal: Maintain or return to baseline ADL function  Description: INTERVENTIONS:  -  Assess patient's ability to carry out ADLs; assess patient's baseline for ADL function and identify physical deficits which impact ability to perform ADLs (bathing, care of mouth/teeth, toileting, grooming, dressing, etc.)  - Assess/evaluate cause of self-care deficits   - Assess range of motion  - Assess patient's mobility; develop plan if impaired  - Assess patient's need for assistive devices and provide as appropriate  - Encourage maximum independence but intervene and supervise when necessary  - Involve family in performance of ADLs  - Assess for home care needs following discharge   - Consider OT consult to assist with ADL evaluation and planning for discharge  - Provide patient education as appropriate  - Monitor functional capacity and physical performance, use of AM PAC & JH-HLM   - Monitor gait, balance and fatigue with ambulation    Outcome: Progressing

## 2025-06-02 NOTE — ASSESSMENT & PLAN NOTE
89 year old M with PMH on Afib (No AC), hx recent admission for GI bleed, HTN, HLD, peripheral vascular disease, peripheral neuropathy  c/o transient sudden-onset LLE weakness, resolved after several minutes    previously on Eliquis, held since 5/23/2025 2/2 GI bleed     CT stroke alert (head/neck) (5/30):   IMPRESSION:  Severe stenosis in the proximal right internal carotid artery (70-99% stenosis).  No significant stenosis of the cervical left carotid or vertebral arteries  No significant intracranial stenosis, large vessel occlusion or aneurysm.    Carotid duplex:   RIGHT:   There is 50-69% stenosis noted in the internal carotid artery.  Plaque is heterogenous and irregular.   Vertebral artery flow is antegrade.  There is no significant subclavian artery disease.   Neurology suspects TIA 2/2 severe right ICA stenosis vs. Afib without coagulation   MRI Brain: No acute infarct. Mild chronic microangiopathic change.   Echo done    AVSS on RA   Chol 134  Trig 89  HDL 43  LDL 73    Patient is stable. Symptoms appear to have resolved. No recurrence of symptoms..    Plan:  Continue statin  Recommend ASA, DAPT in setting of carotid stenosis, defer to GI consult for guidance with starting patient on these in light of recent GI bleed, pt started on plavix, holding NSAIDs per GI recs as pt is high risk  Frequent neurovascular checks   Appreciate Neurology recommendations  Outpt f/u with vascular to discuss options. Pt is very functional 89 y/o male  Other care per primary team

## 2025-06-02 NOTE — PHYSICAL THERAPY NOTE
PHYSICAL THERAPY EVALUATION NOTE      Patient Name: Masoud Steward  Today's Date: 2025    AGE:   89 y.o.  Mrn:   [id number]  ADMIT DX:  Weakness [R53.1]  General weakness [R53.1]  History of upper gastrointestinal bleeding [Z87.19]  Internal carotid artery stenosis, right [I65.21]    Past Medical History[1]  Length Of Stay: 2  PHYSICAL THERAPY EVALUATION :   Patient's identity confirmed via 2 patient identifiers (full name and ) at start of session       25 1630   PT Last Visit   PT Visit Date 25   Note Type   Note type Evaluation   Pain Assessment   Pain Assessment Tool 0-10   Pain Score No Pain   Restrictions/Precautions   Weight Bearing Precautions Per Order No   Other Precautions Bed Alarm;Chair Alarm;Fall Risk;Telemetry   Home Living   Type of Home House   Home Layout One level  (1-2 DUARTE)   Home Equipment Cane;Sock aid;Long-handled shoehorn;Reacher;Walker  (rollator, standard walker)   Additional Comments uses the rollator in the house and a cane in the community. [place] next to the cart return to minimize need for a device at the store. Pt sleeps in the recliner at home.   Prior Function   Level of [place] Independent with ADLs;Independent with functional mobility;Needs assistance with IADLS   Lives With Alone   Receives Help From Family  (daughters)   IADLs Independent with driving;Family/Friend/Other provides meals;Family/Friend/Other provides medication management  (daughter organizes his pills for the month and provides meals to heat up.)   Falls in the last 6 months 1 to 4  (2)   Vocational Retired  ()   General   Family/Caregiver Present No   Cognition   Overall Cognitive Status WFL   Arousal/Participation Alert   Attention Within functional limits   Orientation Level Oriented X4   Memory Within functional limits   Following Commands Follows all commands and directions without  "difficulty   Subjective   Subjective \"I'm walking better. I hope I get home soon\".   RUE Assessment   RUE Assessment WFL   LUE Assessment   LUE Assessment WFL   RLE Assessment   RLE Assessment WFL   LLE Assessment   LLE Assessment WFL   Light Touch   RLE Light Touch Grossly intact   LLE Light Touch Grossly intact   Bed Mobility   Additional Comments Pt received sitting in recliner chair. Bed mobility not assessed.   Transfers   Sit to Stand 5  Supervision   Additional items Armrests   Stand to Sit 5  Supervision   Additional items Armrests   Ambulation/Elevation   Gait pattern   (reciprocal pattern, forward flexed posture, cues to increase proximity to RW.)   Gait Assistance 5  Supervision   Additional items Assist x 1;Verbal cues  (to increase proximity to RW)   Assistive Device Rolling walker   Distance 350ft   Stair Management Assistance Not tested   Ambulation/Elevation Additional Comments Pt was provided with cues to increase proximity to RW with good carryover.   Balance   Static Sitting Normal   Dynamic Sitting Good   Static Standing Good   Dynamic Standing Fair +   Ambulatory Fair +   Activity Tolerance   Activity Tolerance Patient tolerated treatment well   Nurse Made Aware GUADALUPE Altamirano   Assessment   Prognosis Good   Assessment Masoud Steward is a 89 y.o. Male who presents to [facility] on 5/30/25 from home following a fall at the grocery store due to transient LLE weakness. Pt admitted for stroke pathway but MRI was (-) for acute infarct, can not rule out TIA. Orders for PT eval and treat received, w/ activity orders of activity as tolerated and fall precautions. Pt presents w/ comorbidities of A-fib not on anticoagulation, history of GI bleed, hypertension, hyperlipidemia, peripheral vascular disease, peripheral neuropathy. At baseline, pt mobilizes independently w/ a rollator and reports 2 falls in the last 6 months. Upon evaluation, pt currently demonstrates supervision for transfers, and supervision with a " RW for ambulation. Pt denies any further feelings of weakness to his LLE and feels he has returned to his baseline for mobility. The patient's AM-PAC Basic Mobility Inpatient Short Form Raw Score is 20. A Raw score of greater than 17 suggests the patient may benefit from discharge to home. Please also refer to the recommendation of the Physical Therapist for safe discharge planning. Based on current status, Level 3 rehab intensity is recommended at time of discharge, home PT follow up. At this time, pt has no further acute PT needs and is D/C from PT services. Recommend continued ambulation trials with nursing staff and OOB to chair for meals.   Goals   Patient Goals to go home soon   PT Treatment Day 0   Plan   PT Frequency   (D/C PT services)   Discharge Recommendation   Rehab Resource Intensity Level, PT III (Minimum Resource Intensity)   AM-PAC Basic Mobility Inpatient   Turning in Flat Bed Without Bedrails 4   Lying on Back to Sitting on Edge of Flat Bed Without Bedrails 4   Moving Bed to Chair 3   Standing Up From Chair Using Arms 3   Walk in Room 3   Climb 3-5 Stairs With Railing 3   Basic Mobility Inpatient Raw Score 20   Basic Mobility Standardized Score 43.99   [facility] Highest Level Of Mobility   -HLM Goal 6: Walk 10 steps or more   -HLM Achieved 8: Walk 250 feet ot more   End of Consult   Patient Position at End of Consult Bedside chair;Bed/Chair alarm activated;All needs within reach  (BLE elevated, venodynes attached, tray table next to pt./)       The patient's AM-PAC Basic Mobility Inpatient Short Form Raw Score is 20, Standardized Score is 43.99. A standardized score greater than 38.32 (raw score of 16) suggests the patient may benefit from discharge to home which may not coincide with above PT recommendations. However please refer to therapist recommendation for discharge planning given other factors that may influence destination.      Mac Valenzuela, PT             [1]   Past Medical  History:  Diagnosis Date    BPH (benign prostatic hyperplasia)     GERD (gastroesophageal reflux disease)     Hyperlipidemia     Hypertension     Hypothyroidism     Kidney stone     Peripheral vascular disease (HCC)     Walker as ambulation aid

## 2025-06-02 NOTE — ASSESSMENT & PLAN NOTE
- Previously on Eliquis 2.5 mg BID  - Held since 5/23/2025 2/2 admission at [facility] for GI bleed   - Echo ordered, telemetry ordered

## 2025-06-03 VITALS
TEMPERATURE: 97.6 F | OXYGEN SATURATION: 96 % | HEART RATE: 58 BPM | RESPIRATION RATE: 18 BRPM | WEIGHT: 192.02 LBS | SYSTOLIC BLOOD PRESSURE: 123 MMHG | HEIGHT: 72 IN | BODY MASS INDEX: 26.01 KG/M2 | DIASTOLIC BLOOD PRESSURE: 63 MMHG

## 2025-06-03 LAB
ERYTHROCYTE [DISTWIDTH] IN BLOOD BY AUTOMATED COUNT: 13.9 % (ref 11.6–15.1)
HCT VFR BLD AUTO: 36.6 % (ref 36.5–49.3)
HGB BLD-MCNC: 12.1 G/DL (ref 12–17)
MCH RBC QN AUTO: 36 PG (ref 26.8–34.3)
MCHC RBC AUTO-ENTMCNC: 33.1 G/DL (ref 31.4–37.4)
MCV RBC AUTO: 109 FL (ref 82–98)
PLATELET # BLD AUTO: 267 THOUSANDS/UL (ref 149–390)
PMV BLD AUTO: 8.9 FL (ref 8.9–12.7)
RBC # BLD AUTO: 3.36 MILLION/UL (ref 3.88–5.62)
WBC # BLD AUTO: 5.5 THOUSAND/UL (ref 4.31–10.16)

## 2025-06-03 PROCEDURE — 99239 HOSP IP/OBS DSCHRG MGMT >30: CPT | Performed by: PHYSICIAN ASSISTANT

## 2025-06-03 PROCEDURE — 85027 COMPLETE CBC AUTOMATED: CPT | Performed by: PHYSICIAN ASSISTANT

## 2025-06-03 RX ORDER — MIDODRINE HYDROCHLORIDE 10 MG/1
10 TABLET ORAL 3 TIMES DAILY
Qty: 90 TABLET | Refills: 0 | Status: SHIPPED | OUTPATIENT
Start: 2025-06-03

## 2025-06-03 RX ORDER — AMOXICILLIN 250 MG
1 CAPSULE ORAL 2 TIMES DAILY
Qty: 60 TABLET | Refills: 0 | Status: SHIPPED | OUTPATIENT
Start: 2025-06-03

## 2025-06-03 RX ORDER — CLOPIDOGREL BISULFATE 75 MG/1
75 TABLET ORAL DAILY
Qty: 30 TABLET | Refills: 0 | Status: SHIPPED | OUTPATIENT
Start: 2025-06-04

## 2025-06-03 RX ORDER — ATORVASTATIN CALCIUM 40 MG/1
40 TABLET, FILM COATED ORAL EVERY EVENING
Qty: 30 TABLET | Refills: 0 | Status: SHIPPED | OUTPATIENT
Start: 2025-06-03

## 2025-06-03 RX ORDER — BISACODYL 10 MG
10 SUPPOSITORY, RECTAL RECTAL DAILY PRN
Qty: 12 SUPPOSITORY | Refills: 0 | Status: SHIPPED | OUTPATIENT
Start: 2025-06-03

## 2025-06-03 RX ORDER — PANTOPRAZOLE SODIUM 40 MG/1
40 TABLET, DELAYED RELEASE ORAL 2 TIMES DAILY
Qty: 60 TABLET | Refills: 0 | Status: SHIPPED | OUTPATIENT
Start: 2025-06-03

## 2025-06-03 RX ORDER — POLYETHYLENE GLYCOL 3350 17 G/17G
17 POWDER, FOR SOLUTION ORAL DAILY
Qty: 30 EACH | Refills: 0 | Status: SHIPPED | OUTPATIENT
Start: 2025-06-03

## 2025-06-03 RX ADMIN — MIDODRINE HYDROCHLORIDE 10 MG: 5 TABLET ORAL at 08:59

## 2025-06-03 RX ADMIN — POLYETHYLENE GLYCOL 3350 17 G: 17 POWDER, FOR SOLUTION ORAL at 08:58

## 2025-06-03 RX ADMIN — LEVOTHYROXINE SODIUM 125 MCG: 100 TABLET ORAL at 05:49

## 2025-06-03 RX ADMIN — SENNOSIDES AND DOCUSATE SODIUM 1 TABLET: 50; 8.6 TABLET ORAL at 08:59

## 2025-06-03 RX ADMIN — PANTOPRAZOLE SODIUM 40 MG: 40 INJECTION, POWDER, FOR SOLUTION INTRAVENOUS at 08:59

## 2025-06-03 RX ADMIN — CLOPIDOGREL 75 MG: 75 TABLET ORAL at 08:59

## 2025-06-03 NOTE — CASE MANAGEMENT
Case Management Discharge Planning Note    Patient name Masoud Steward  Location /-01 MRN [id number]  : 1934 Date 6/3/2025       Current Admission Date: 2025  Current Admission Diagnosis:Transient weakness of left lower extremity   Patient Active Problem List    Diagnosis Date Noted    Chronic constipation 2025    Transient weakness of left lower extremity 2025    Internal carotid artery stenosis, right 2025    GI bleed 2025    Orthostatic hypotension 2024    Paroxysmal atrial fibrillation (HCC) 2023    BPH (benign prostatic hyperplasia)     Peripheral vascular disease (HCC)     Preoperative clearance 2022    Drug-induced constipation 2022    Cognitive decline 10/26/2022    Hyponatremia 10/20/2022    Status post fall 10/19/2022    Urinary retention 10/19/2022    History of fracture of right hip 10/19/2022    Lumbar compression fracture (HCC) 10/19/2022    Abnormal urinalysis 10/18/2022    Disorientation 10/18/2022    Hydronephrosis with urinary obstruction due to renal calculus 10/18/2022    Essential (primary) hypertension 10/08/2022    Gastro-esophageal reflux disease without esophagitis 10/08/2022    Hyperlipidemia 10/08/2022    Polyneuropathy, peripheral sensorimotor axonal     Hypothyroidism 2007      LOS (days): 3  Geometric Mean LOS (GMLOS) (days): 2  Days to GMLOS:-0.8     OBJECTIVE:  Risk of Unplanned Readmission Score: 10.19         Current admission status: Inpatient   Preferred Pharmacy:   [id number] - [place] - [address]  [address]  [place] [address]  Phone: [phone] Fax: [phone]    Primary Care Provider: Ishaan Enamorado MD    Primary Insurance: [facility]  Secondary Insurance:     DISCHARGE DETAILS:                                Requested Home Health Care         Is the patient interested in HHC at discharge?: Yes  Home Health Discipline requested:: Occupational  Therapy, Physical Therapy  Home Health Agency Name:: [facility] External Referral Reason (only applicable if external HHA name selected): Patient has established relationship with provider  Home Health Follow-Up Provider:: PCP  Home Health Services Needed:: Strengthening/Theraputic Exercises to Improve Function, Evaluate Functional Status and Safety, Gait/ADL Training  Homebound Criteria Met:: Uses an Assist Device (i.e. cane, walker, etc)  Supporting Clincal Findings:: Limited Endurance, Fatigues Easliy in Short Distances    DME Referral Provided  Referral made for DME?: No    Other Referral/Resources/Interventions Provided:  Interventions: HHC  Referral Comments: [facility] reserved in [place], per pt and family preference. [facility] could not accept.         Treatment Team Recommendation: Home with Home Health Care  Discharge Destination Plan:: Home with Home Health Care

## 2025-06-03 NOTE — ASSESSMENT & PLAN NOTE
Presenting w/ transient LLE weakness  CT head, CTA head/neck w/ severe stenosis in the proximal right ICA  Carotid duplex/vascular surgery consultation as below  MRI brain: no acute infarct w/ mild chronic microangiopathic change   ECHO: EF 55%, trace MR, mild TR  A1c 5.6; LDL 73  Neurology consulted; pt placed on the stroke pathway   Outpt f/u in 6 wks   C/w Plavix 75 mg q day & statin   Cardiology f/u for Zio patch as an outpt   Concern for embolic TIA d/t cerebral embolism in the setting of Afib while not on anticoagulation and severe right ICA stenosis, POA, evidenced by negative MRI for acute infarct, transient LLE weakness requiring Plavix, stain, ECHO and Neurology/ Cardiology consults.

## 2025-06-03 NOTE — ASSESSMENT & PLAN NOTE
CTA head/neck w/ severe stenosis in the proximal right internal carotid artery (70-99% stenosis)   Carotid duplex: R - 50-69% stenosis noted in the internal carotid artery; plaque is heterogenous and irregular   Vascular surgery consulted, appreciate recs  Recommended the addition of Aspirin but deferred to GI w/ high risk of rebleeding   C/w statin   C/w Plavix added on 05/30  At this time, will defer addition of aspirin w/ high risk of rebleeding   Outpt f/u advised   Shared medical decision making was conducted with patient/daughter regarding deferring the addition of aspirin at this time

## 2025-06-03 NOTE — PROGRESS NOTES
Patient:  BATSHEVA PAUL    MRN:  [id number]    Joe Request ID:  [id number]    Level of care reserved:  Home Health Agency    Partner Reserved:  [place] [address] [phone]    Clinical needs requested:  physical therapy, occupational therapy    Geography searched:  [phone]    Start of Service:    Request sent:  8:46am EDT on 6/3/2025 by Ishaan Hugo    Partner reserved:  9:08am EDT on 6/3/2025 by Ishaan Hugo    Choice list shared:  9:07am EDT on 6/3/2025 by Ishaan Hugo

## 2025-06-03 NOTE — ASSESSMENT & PLAN NOTE
"Recent hospitalization at [facility] (5/23- 5/27) for hematemesis  Emergent EGD at that time revealed \"spurting vessel at EGJ s/p Endo Clip x 1 and p.o. diet with successful hemostasis \".  Patient had EGD in July 2024 at [facility]- also noted gastric erosions at that time  GI consulted, appreciate recs   C/w omeprazole BID on d/c     Recent Labs     06/01/25  0428 06/02/25  0807 06/03/25  0316   HGB 10.9* 11.7* 12.1     "

## 2025-06-03 NOTE — DISCHARGE SUMMARY
"Discharge Summary - Hospitalist   Name: Masoud Steward 89 y.o. male I MRN: [id number]  Unit/Bed#: [facility] I Date of Admission: 5/30/2025   Date of Service: 6/3/2025 I Hospital Day: 3     Assessment & Plan  Transient weakness of left lower extremity  Presenting w/ transient LLE weakness  CT head, CTA head/neck w/ severe stenosis in the proximal right ICA  Carotid duplex/vascular surgery consultation as below  MRI brain: no acute infarct w/ mild chronic microangiopathic change   ECHO: EF 55%, trace MR, mild TR  A1c 5.6; LDL 73  Neurology consulted; pt placed on the stroke pathway   Outpt f/u in 6 wks   C/w Plavix 75 mg q day & statin   Cardiology f/u for Zio patch as an outpt   Concern for embolic TIA d/t cerebral embolism in the setting of Afib while not on anticoagulation and severe right ICA stenosis, POA, evidenced by negative MRI for acute infarct, transient LLE weakness requiring Plavix, stain, ECHO and Neurology/ Cardiology consults.   Internal carotid artery stenosis, right  CTA head/neck w/ severe stenosis in the proximal right internal carotid artery (70-99% stenosis)   Carotid duplex: R - 50-69% stenosis noted in the internal carotid artery; plaque is heterogenous and irregular   Vascular surgery consulted, appreciate recs  Recommended the addition of Aspirin but deferred to GI w/ high risk of rebleeding   C/w statin   C/w Plavix added on 05/30  At this time, will defer addition of aspirin w/ high risk of rebleeding   Outpt f/u advised   Shared medical decision making was conducted with patient/daughter regarding deferring the addition of aspirin at this time  GI bleed  Recent hospitalization at [facility] (5/23- 5/27) for hematemesis  Emergent EGD at that time revealed \"spurting vessel at EGJ s/p Endo Clip x 1 and p.o. diet with successful hemostasis \".  Patient had EGD in July 2024 at [facility]- also noted gastric erosions at that time  GI consulted, appreciate recs   C/w omeprazole BID on d/c     Recent Labs    "  06/01/25  0428 06/02/25  0807 06/03/25  0316   HGB 10.9* 11.7* 12.1     Paroxysmal atrial fibrillation (HCC)  Known to cardiology  On Eliquis previously, d/c'd after recent GI bleed at [facility]  Rate controlled   Chronic constipation  2 large BM's   C/w bowel regimen at discharge  Orthostatic hypotension  H/o   C/w recently added midodrine 10 mg TID   Hypothyroidism  C/w levothyroxine  TSH 10.8 & T4 0.63 in 05/2025 - outpt med titration      Medical Problems       Resolved Problems  Date Reviewed: 2/21/2024   None       Discharging Physician / Practitioner: Yajaira Ronquillo PA-C  PCP: Ishaan Enamorado MD  Admission Date:   Admission Orders (From admission, onward)       Ordered        05/31/25 1458  INPATIENT ADMISSION  Once            05/30/25 1458  Place in Observation  Once                          Discharge Date: 06/03/25    Next Steps for Physician/AP Assuming Care:  Routine labs & TSH/T4 trending     Test Results Pending at Discharge (will require follow up):  None    Medication Changes for Discharge & Rationale:   PPI changed to BID   Plavix no aspirin   Bowel regimen   See after visit summary for reconciled discharge medications provided to patient and/or family.     Consultations During Hospital Stay:  GI, vascular surgery, neurology, case management    Procedures Performed:   N/A    Significant Findings / Test Results:   MRI brain wo contrast  Result Date: 5/31/2025  Impression: No acute infarct. Mild chronic microangiopathic change. Workstation performed: [id number]     CT stroke alert brain  Result Date: 5/30/2025  Impression: No acute intracranial abnormality. Findings were reported to Nader Padilla  via HIPAA compliant secure electronic messaging at 12:53 p.m. Workstation performed: [id number]     CTA stroke alert (head/neck)  Result Date: 5/30/2025  Impression: Severe stenosis in the proximal right internal carotid artery. No significant stenosis of the cervical left carotid or vertebral arteries No  significant intracranial stenosis, large vessel occlusion or aneurysm. Findings were directly discussed with Nader Padilla at 1:01 p.m. Workstation performed: [id number]      Incidental Findings:   N/A    Hospital Course:   Masoud Steward is a 89 y.o. male patient who originally presented to the hospital on 5/30/2025 due to TIA symptoms.  Patient presented to the emergency department with transient left lower extremity weakness.  Patient had a CTA of head and neck which showed severe right sided proximal internal carotid history stenosis.  Neurology was consulted during hospital course.  Echocardiogram was completed.  MRI was without acute infarct.  Neurology recommended outpatient follow-up with her service in 6 weeks.  Patient was recommended to continue statin and Plavix daily.  Patient should follow-up with cardiology for Zio patch placement.    Patient was evaluated by vascular surgery for carotid stenosis.  Ultrasound demonstrated 50 to 69% stenosis of right ICA.  Vascular surgery recommended aspirin and Plavix in addition to statin.  Patient had a recent gastrointestinal hemorrhage which required clipping.  GI was consulted during hospital course.  Shared medical decision making was conducted with daughter and patient.  At this time the addition of aspirin will be deferred with high risk of rebleed.  Patient will be discharged home on statin and Plavix.    Please see above list of diagnoses and related plan for additional information.     Discharge Day Visit / Exam:   Subjective: Patient is doing well.  He is tolerating p.o. intake.  He has had no hematemesis, melena, hematochezia.  Currently denies any neurofocal deficits.  He is eager to go home.  Vitals: Blood Pressure: 123/63 (06/03/25 0757)  Pulse: 58 (06/03/25 0757)  Temperature: 97.6 °F (36.4 °C) (06/03/25 0757)  Temp Source: Oral (06/03/25 0757)  Respirations: 18 (06/03/25 0757)  Height: 6' (182.9 cm) (06/02/25 0908)  Weight - Scale: 87.1 kg  (192 lb 0.3 oz) (06/02/25 0908)  SpO2: 96 % (06/03/25 0757)  Physical Exam  Constitutional:       Appearance: He is normal weight.      Comments: Somewhat frail, pleasant   HENT:      Head: Normocephalic.      Right Ear: External ear normal.      Left Ear: External ear normal.      Nose: Nose normal.      Mouth/Throat:      Mouth: Mucous membranes are moist.      Pharynx: Oropharynx is clear.     Eyes:      Extraocular Movements: Extraocular movements intact.      Conjunctiva/sclera: Conjunctivae normal.       Cardiovascular:      Rate and Rhythm: Normal rate and regular rhythm.      Pulses: Normal pulses.      Heart sounds: Normal heart sounds.   Pulmonary:      Effort: Pulmonary effort is normal.      Breath sounds: Normal breath sounds.   Abdominal:      General: Abdomen is flat. There is no distension.      Palpations: Abdomen is soft.      Tenderness: There is no abdominal tenderness. There is no guarding or rebound.     Musculoskeletal:         General: Swelling (Trace lower extremity edema in shins; more pronounced bilateral edema in feet) present. Normal range of motion.      Cervical back: Normal range of motion.     Skin:     General: Skin is warm and dry.      Coloration: Skin is not jaundiced.      Findings: No bruising or lesion.     Neurological:      General: No focal deficit present.      Mental Status: He is alert and oriented to person, place, and time. Mental status is at baseline.      Motor: No weakness.     Psychiatric:         Mood and Affect: Mood normal.         Behavior: Behavior normal.          Discussion with Family: Updated  (daughter) via phone.     Discharge instructions/Information to patient and family:   See after visit summary for information provided to patient and family.      Provisions for Follow-Up Care:  See after visit summary for information related to follow-up care and any pertinent home health orders.      Mobility at time of Discharge:   Basic Mobility  Inpatient Raw Score: 19  JH-HLM Goal: 6: Walk 10 steps or more  JH-HLM Achieved: 6: Walk 10 steps or more  HLM Goal achieved. Continue to encourage appropriate mobility.     Disposition:   Home with VNA Services (Reminder: Complete face to face encounter) - Jen    Planned Readmission: None    Administrative Statements   Discharge Statement:  I have spent a total time of 65 minutes in caring for this patient on the day of the visit/encounter. >30 minutes of time was spent on: Diagnostic results, Prognosis, Risks and benefits of tx options, Instructions for management, Patient and family education, Importance of tx compliance, Risk factor reductions, Impressions, Counseling / Coordination of care, Documenting in the medical record, Reviewing / ordering tests, medicine, procedures  , and Communicating with other healthcare professionals .    **Please Note: This note may have been constructed using a voice recognition system**

## 2025-06-03 NOTE — DISCHARGE INSTR - AVS FIRST PAGE
Please return to the emergency department for any concerning symptoms.    Please follow-up with your family doctor within 3 to 7 days of discharge.  Would recommend your thyroid function test being trended by your family doctor.  We would also recommend repeat lab work within 1 to 2 weeks of discharge to be ordered by your family doctor.    Please call your cardiology office.  You need a Zio patch/Holter monitor to monitor for arrhythmias which could lead to mini strokes.    Vascular surgery should call you to arrange a follow-up appointment.  Please call their office if you do not hear from them for an appointment.    Please follow-up with your outpatient gastrointestinal doctor through [facility] who placed clipping.    Neurology should be calling you to arrange a follow-up appointment.  If not please call their office directly for a follow-up appointment within 6 weeks of discharge.    You will be prescribed twice daily Protonix  You will be prescribed Lipitor 40 mg daily  You will be prescribed Plavix

## 2025-06-04 ENCOUNTER — TELEPHONE (OUTPATIENT)
Dept: CARDIOLOGY CLINIC | Facility: CLINIC | Age: OVER 89
End: 2025-06-04

## 2025-06-04 DIAGNOSIS — I48.0 PAROXYSMAL ATRIAL FIBRILLATION (HCC): Primary | ICD-10-CM

## 2025-06-04 NOTE — TELEPHONE ENCOUNTER
Daughter called told karley ordered, insurance being checked and will arrive in the mail.  Explained karley to her and she verbalized understanding.

## 2025-06-04 NOTE — TELEPHONE ENCOUNTER
Daughter calling states patient was in the hospital ([facility]) told to reach out to his cardiologist to get a zio patch/no order placed  Pt sees Dr Mason.  Please advise if order can be placed and we will need to notify his daughter with outcome of decision.  Pt had episode of dizziness at  grocery store ended up in hospital.

## 2025-06-04 NOTE — UTILIZATION REVIEW
NOTIFICATION OF ADMISSION DISCHARGE   This is a Notification of Discharge from [facility]. Please be advised that this patient has been discharge from our facility. Below you will find the admission and discharge date and time including the patient’s disposition.   UTILIZATION REVIEW CONTACT:  Utilization Review Assistants  Network Utilization Review Department  Phone: [phone] x carefully listen to the prompts. All voicemails are confidential.  Email: [email or web address]     ADMISSION INFORMATION  PRESENTATION DATE: 5/30/2025 12:24 PM  OBERVATION ADMISSION DATE: 05/30/2025 1458  INPATIENT ADMISSION DATE: 5/31/25  2:58 PM   DISCHARGE DATE: 6/3/2025  2:00 PM   DISPOSITION:Home/Self Care    Network Utilization Review Department  ATTENTION: Please call with any questions or concerns to [phone] and carefully listen to the prompts so that you are directed to the right person. All voicemails are confidential.   For Discharge needs, contact Care Management DC Support Team at [phone] opt. 2  Send all requests for admission clinical reviews, approved or denied determinations and any other requests to dedicated fax number below belonging to the campus where the patient is receiving treatment. List of dedicated fax numbers for the Facilities:  FACILITY NAME UR FAX NUMBER   ADMISSION DENIALS (Administrative/Medical Necessity) [phone]   DISCHARGE SUPPORT TEAM ([facility]) [phone]   PARENT CHILD HEALTH (Maternity/NICU/Pediatrics) [phone]   General acute hospital [phone]   [facility] [phone]   [facility] [phone]   [facility] [phone]   [facility] [phone]   [facility] [phone]   [facility] [phone]   [facility] [phone]   [facility]  [facility] [phone]   [facility] [phone]   [facility] [phone]   [facility] [phone]

## 2025-08-06 ENCOUNTER — OFFICE VISIT (OUTPATIENT)
Dept: NEUROLOGY | Facility: CLINIC | Age: OVER 89
End: 2025-08-06
Payer: COMMERCIAL

## 2025-08-06 VITALS
OXYGEN SATURATION: 93 % | DIASTOLIC BLOOD PRESSURE: 70 MMHG | BODY MASS INDEX: 24.6 KG/M2 | WEIGHT: 181.6 LBS | SYSTOLIC BLOOD PRESSURE: 126 MMHG | HEIGHT: 72 IN | TEMPERATURE: 97.8 F | HEART RATE: 66 BPM | RESPIRATION RATE: 18 BRPM

## 2025-08-06 DIAGNOSIS — R29.898 TRANSIENT WEAKNESS OF LEFT LOWER EXTREMITY: Primary | ICD-10-CM

## 2025-08-06 DIAGNOSIS — I65.21 INTERNAL CAROTID ARTERY STENOSIS, RIGHT: ICD-10-CM

## 2025-08-06 DIAGNOSIS — I48.0 PAROXYSMAL ATRIAL FIBRILLATION (HCC): ICD-10-CM

## 2025-08-06 DIAGNOSIS — K29.61 GASTROINTESTINAL HEMORRHAGE ASSOCIATED WITH OTHER GASTRITIS: ICD-10-CM

## 2025-08-06 PROBLEM — R41.0 DISORIENTATION: Status: RESOLVED | Noted: 2022-10-18 | Resolved: 2025-08-06

## 2025-08-06 PROCEDURE — 99214 OFFICE O/P EST MOD 30 MIN: CPT | Performed by: PHYSICIAN ASSISTANT

## 2025-08-06 RX ORDER — AMOXICILLIN 250 MG
1 CAPSULE ORAL
COMMUNITY
Start: 2025-05-27

## 2025-08-06 RX ORDER — MIDODRINE HYDROCHLORIDE 5 MG/1
5 TABLET ORAL 3 TIMES DAILY
COMMUNITY
Start: 2025-06-15

## (undated) DEVICE — GLOVE SRG BIOGEL ECLIPSE 7.5

## (undated) DEVICE — PREMIUM DRY TRAY LF: Brand: MEDLINE INDUSTRIES, INC.

## (undated) DEVICE — LUBRICANT SURGILUBE TUBE 4 OZ  FLIP TOP

## (undated) DEVICE — CATH FOLEY 14FR 5ML 2 WAY SILICONE ELASTIMER

## (undated) DEVICE — CATHETER PLUG WITH CAP: Brand: DOVER

## (undated) DEVICE — EXIDINE 4 PCT

## (undated) DEVICE — CATH FOLEY 14FR 5ML 2WAY LUBRICATH

## (undated) DEVICE — CATH FOLEY 18FR 5ML 2WAY LUBRICATH

## (undated) DEVICE — CHLORHEXIDINE 4PCT 4 OZ

## (undated) DEVICE — BAG URINE DRAINAGE 4000ML CONTINUOUS IRR

## (undated) DEVICE — UROCATCH BAG

## (undated) DEVICE — PACK TUR

## (undated) DEVICE — POV-IOD SOLUTION 4OZ BT

## (undated) DEVICE — ASTOUND STANDARD SURGICAL GOWN, XL: Brand: CONVERTORS

## (undated) DEVICE — SKIN MARKER DUAL TIP WITH RULER CAP, FLEXIBLE RULER AND LABELS: Brand: DEVON

## (undated) DEVICE — TUBING SUCTION 5MM X 12 FT

## (undated) DEVICE — SCD SEQUENTIAL COMPRESSION COMFORT SLEEVE MEDIUM KNEE LENGTH: Brand: KENDALL SCD

## (undated) DEVICE — BASIC SINGLE BASIN-LF: Brand: MEDLINE INDUSTRIES, INC.

## (undated) DEVICE — STERILE POLYISOPRENE POWDER-FREE SURGICAL GLOVES: Brand: PROTEXIS

## (undated) DEVICE — GUIDEWIRE STRGHT TIP 0.035 IN  SOLO PLUS

## (undated) DEVICE — 3M™ DURAPORE™ SURGICAL TAPE 2 INCHES X 10YARDS (5.0CM X 9.1M) 6ROLLS/CARTON 10CARTONS/CASE 1538-2: Brand: 3M™ DURAPORE™

## (undated) DEVICE — INTENDED FOR TISSUE SEPARATION, AND OTHER PROCEDURES THAT REQUIRE A SHARP SURGICAL BLADE TO PUNCTURE OR CUT.: Brand: BARD-PARKER ®  SAFETY SCALPED

## (undated) DEVICE — CATH FOLEY 16FR 5ML 2WAY LUBRICATH

## (undated) DEVICE — UROLOGIC DRAIN BAG: Brand: UNBRANDED

## (undated) DEVICE — BAG URINE DRAINAGE 2000ML ANTI RFLX LF

## (undated) DEVICE — Device: Brand: OLYMPUS

## (undated) DEVICE — Device

## (undated) DEVICE — CATH SECURE FOLEY

## (undated) DEVICE — GLOVE INDICATOR PI UNDERGLOVE SZ 8 BLUE

## (undated) DEVICE — NEEDLE FASCIAL INCISING 18G 5CM

## (undated) DEVICE — BAG DRAINAGE URINARY W TOWER

## (undated) DEVICE — INVIEW CLEAR LEGGINGS: Brand: CONVERTORS

## (undated) DEVICE — SUT VICRYL 2-0 CT-1 36 IN J945H

## (undated) DEVICE — SYRINGE 20ML LL

## (undated) DEVICE — ENDOSCOPIC VALVE WITH ADAPTER.: Brand: SURSEAL® II

## (undated) DEVICE — 400 MICRON SINGLE-USE HOLMIUM FIBER ASSEMBLY WITH FLAT TIP: Brand: OPTILITE

## (undated) DEVICE — GLOVE SRG BIOGEL 7.5

## (undated) DEVICE — GLOVE INDICATOR PI UNDERGLOVE SZ 7.5 BLUE

## (undated) DEVICE — SYRINGE 10ML LL

## (undated) DEVICE — URETERAL CATHETER 5 FR CONE TIP

## (undated) DEVICE — BASKET SPEC RETRVL 3FR 90CM SS

## (undated) DEVICE — GAUZE SPONGES,16 PLY: Brand: CURITY

## (undated) DEVICE — SPECIMEN CONTAINER STERILE PEEL PACK

## (undated) DEVICE — EVACUATOR BLADDER ELLIK DISP STRL

## (undated) DEVICE — CATH SUPRAPUBIC PEELAWAY SET  18FR X 12CM

## (undated) DEVICE — 3M™ STERI-STRIP™ COMPOUND BENZOIN TINCTURE 40 BAGS/CARTON 4 CARTONS/CASE C1544: Brand: 3M™ STERI-STRIP™